# Patient Record
Sex: MALE | Race: WHITE | Employment: FULL TIME | URBAN - METROPOLITAN AREA
[De-identification: names, ages, dates, MRNs, and addresses within clinical notes are randomized per-mention and may not be internally consistent; named-entity substitution may affect disease eponyms.]

---

## 2017-01-23 ENCOUNTER — HOSPITAL ENCOUNTER (OUTPATIENT)
Dept: GENERAL RADIOLOGY | Age: 67
Discharge: HOME | End: 2017-01-23
Attending: ORTHOPAEDIC SURGERY
Payer: MEDICARE

## 2017-01-23 ENCOUNTER — HOSPITAL ENCOUNTER (OUTPATIENT)
Dept: CT IMAGING | Age: 67
Discharge: HOME | End: 2017-01-23
Attending: ORTHOPAEDIC SURGERY
Payer: MEDICARE

## 2017-01-23 VITALS
SYSTOLIC BLOOD PRESSURE: 122 MMHG | HEART RATE: 53 BPM | RESPIRATION RATE: 20 BRPM | BODY MASS INDEX: 35.55 KG/M2 | OXYGEN SATURATION: 98 % | HEIGHT: 69 IN | WEIGHT: 240 LBS | DIASTOLIC BLOOD PRESSURE: 65 MMHG

## 2017-01-23 DIAGNOSIS — M48.04 THORACIC SPINAL STENOSIS: ICD-10-CM

## 2017-01-23 DIAGNOSIS — M48.02 FORAMINAL STENOSIS OF CERVICAL REGION: ICD-10-CM

## 2017-01-23 PROCEDURE — 72126 CT NECK SPINE W/DYE: CPT

## 2017-01-23 PROCEDURE — 62305 MYELOGRAPHY LUMBAR INJECTION: CPT

## 2017-01-23 PROCEDURE — 74011636320 HC RX REV CODE- 636/320: Performed by: RADIOLOGY

## 2017-01-23 PROCEDURE — 72129 CT CHEST SPINE W/DYE: CPT

## 2017-01-23 RX ADMIN — IOHEXOL 20 ML: 240 INJECTION, SOLUTION INTRATHECAL; INTRAVASCULAR; INTRAVENOUS; ORAL at 08:50

## 2017-01-23 NOTE — IP AVS SNAPSHOT
[address] [facility] 
[phone] Patient: Ethel Sales. MRN: [id number] KGJ:[birth year] You are allergic to the following No active allergies Recent Documentation Height Weight BMI Smoking Status 1.753 m 108.9 kg 35.44 kg/m2 Never Smoker Emergency Contacts Name Discharge Info Relation Home Work Mobile Ashlie Godinez DISCHARGE CAREGIVER [3] Spouse [3] [phone] About your hospitalization You were admitted on:  January 23, 2017 You last received care in the:  [facility] RADIOLOGY You were discharged on:  January 23, 2017 Unit phone number:  [phone] Why you were hospitalized Your primary diagnosis was:  Not on File Providers Seen During Your Hospitalizations Provider Role Specialty Primary office phone Luan Dos Santos MD Attending Provider Orthopedic Surgery [phone] Your Primary Care Physician (PCP) Primary Care Physician Office Phone Office Fax Dillon Shanks [phone] Follow-up Information None Your Appointments Monday January 23, 2017  1:00 PM EST  
CT SPINE [facility] W CONT with ED [facility] CT [address] [facility] RAD CT ([address]) [address] [facility]  
[phone] CONTRAST STUDY: 1. The patient should not eat solid food four hours before the appointment but should be encouraged to drink clear liquids. 2. The patient will require IV access for contrast administration. 3. The patient should not take  Ibuprofen (Advil, Motrin, etc.) and Naproxen Sodium (Aleve, etc.)  on the day of the exam. Stopping non-steroidal anti-inflammatory agents (NSAIDs) like Ibuprofen decreases the risk of kidney damage from the x-ray contrast (dye).  4. Bring any non [facility] facility films/images pertaining to the area of interest with you on the day of appointment. 5. Bring current lab work if available(within last 90 days [facility]) ***If scheduled at [facility], iSTAT is not available, labs will need to be done before appointment*** 6. Check in at registration at least 30 minutes before appt time unless you were instructed to do otherwise. 7. If you have to drink oral contrast please pick it up any weekday prior to your appointment, if you cannot please check in 2 hrs  before appt time. Patient should report to outpatient registration ([facility]) 30 minutes prior to the appointment time unless instructed otherwise. Current Discharge Medication List  
  
ASK your doctor about these medications Dose & Instructions Dispensing Information Comments Morning Noon Evening Bedtime  
 aspirin 325 mg tablet Commonly known as:  ASPIRIN Your next dose is: Today, Tomorrow Other:  _________ Dose:  325 mg Take 325 mg by mouth daily. Refills:  0  
     
   
   
   
  
 * captopril 12.5 mg tablet Commonly known as:  CAPOTEN Your next dose is: Today, Tomorrow Other:  _________ Dose:  12.5 mg Take 1 Tab by mouth two (2) times a day. Quantity:  180 Tab Refills:  3  
     
   
   
   
  
 * captopril 12.5 mg tablet Commonly known as:  CAPOTEN Your next dose is: Today, Tomorrow Other:  _________ TAKE 1 TABLET BY MOUTH EVERY DAY BEFORE BREAKFAST, LUNCH AND DINNER Quantity:  90 Tab Refills:  3  
     
   
   
   
  
 carvedilol 25 mg tablet Commonly known as:  Haskel Scarce Your next dose is: Today, Tomorrow Other:  _________ Dose:  12.5 mg Take 0.5 Tabs by mouth two (2) times a day. Needs an appointment Quantity:  30 Tab Refills:  4 CENTRUM SILVER Tab tablet Generic drug:  multivitamins-minerals-lutein Your next dose is: Today, Tomorrow Other:  _________ Take  by mouth. Refills:  0  
     
   
   
   
  
 * CRESTOR 20 mg tablet Generic drug:  rosuvastatin Your next dose is: Today, Tomorrow Other:  _________ TAKE 1 TABLET BY MOUTH EVERY DAY Quantity:  90 Tab Refills:  2  
     
   
   
   
  
 * CRESTOR 20 mg tablet Generic drug:  rosuvastatin Your next dose is: Today, Tomorrow Other:  _________ TAKE 1 TABLET BY MOUTH EVERY DAY Quantity:  90 Tab Refills:  2  
     
   
   
   
  
 cyanocobalamin 1,000 mcg tablet Your next dose is: Today, Tomorrow Other:  _________ Dose:  1000 mcg Take 1,000 mcg by mouth daily. Refills:  0  
     
   
   
   
  
 furosemide 20 mg tablet Commonly known as:  LASIX Your next dose is: Today, Tomorrow Other:  _________ TAKE 1 TABLET BY MOUTH EVERY DAY***NEED OFFICE VISIT BEFORE FURTHER FILLS Quantity:  30 Tab Refills:  5 LEVITRA 20 mg tablet Generic drug:  vardenafil Your next dose is: Today, Tomorrow Other:  _________ Dose:  20 mg Take 20 mg by mouth as needed. Refills:  99  
     
   
   
   
  
 nitroglycerin 400 mcg/spray spray Commonly known as:  Matthieu Wei Your next dose is: Today, Tomorrow Other:  _________ Dose:  1 Spray 1 Spray by SubLINGual route every five (5) minutes as needed. Quantity:  1 Bottle Refills:  1  
     
   
   
   
  
 omega-3-dha-epa-dpa-fish oil 1,050-1,200 mg Cap Your next dose is: Today, Tomorrow Other:  _________ Dose:  1 Cap Take 1 Cap by mouth two (2) times a day. Refills:  0  
     
   
   
   
  
 promethazine-dm Syrp Your next dose is: Today, Tomorrow Other:  _________ Dose:  5 mL Take 5 mL by mouth four (4) times daily as needed. Quantity:  180 mL Refills:  2  
     
   
   
   
  
 tamsulosin 0.4 mg capsule Commonly known as:  FLOMAX Your next dose is: Today, Tomorrow Other:  _________ TAKE ONE CAPSULE BY MOUTH EVERY DAY Quantity:  30 Cap Refills:  10  
     
   
   
   
  
 VITAMIN D3 1,000 unit tablet Generic drug:  cholecalciferol Your next dose is: Today, Tomorrow Other:  _________ Take  by mouth daily. Refills:  0 ZETIA 10 mg tablet Generic drug:  ezetimibe Your next dose is: Today, Tomorrow Other:  _________ TAKE 1 TABLET BY MOUTH EVERY DAY Quantity:  90 Tab Refills:  2  
     
   
   
   
  
 * Notice: This list has 4 medication(s) that are the same as other medications prescribed for you. Read the directions carefully, and ask your doctor or other care provider to review them with you. Discharge Instructions Kristin Oconnell [facility] Special Procedures/Radiology Department Radiologist:  Dr. Marily Jon Date: January 23, 2016 Myelogram Discharge Instructions Restrict your activity for the next 48 hours. You may get up and sit in the chair or get up and go to the bathroom with slow movements. You must keep your head above your chest until 8 a.m. tomorrow. Rest as much as possible tonight and tomorrow. Resume your previous diet and follow the medication reconciliation form. Drink plenty of water. Avoid products with caffeine. You may take Tylenol, as directed on the label, for pain or discomfort. Avoid ibuprofen (Advil, Motrin) and aspirin as they may cause bleeding. Avoid lifting anything heavier than 5 pounds. Avoid excessive bending and lifting as this may increase the chance of having a headache. Be sure to follow up with your physician. Take your CD disk with you. If you have severe pain, or if you have any questions or concerns, please call [phone] and ask to speak to the nurse on-call. Patient Signature:  ______________________________________________ Discharge Orders None Introducing [facility] HE[facility]! [employer] introduces [employer] patient portal. Now you can access parts of your medical record, email your doctor's office, and request medication refills online. 1. In your internet browser, go to [email or web address] 2. Click on the First Time User? Click Here link in the Sign In box. You will see the New Member Sign Up page. 3. Enter your [employer] Access Code exactly as it appears below. You will not need to use this code after youve completed the sign-up process. If you do not sign up before the expiration date, you must request a new code. · [employer] Access Code: [email or web address] Expires: 3/22/2017  8:25 AM 
 
4. Enter the last four digits of your Social Security Number (xxxx) and Date of Birth (mm/dd/yyyy) as indicated and click Submit. You will be taken to the next sign-up page. 5. Create a [employer] ID. This will be your [employer] login ID and cannot be changed, so think of one that is secure and easy to remember. 6. Create a [employer] password. You can change your password at any time. 7. Enter your Password Reset Question and Answer. This can be used at a later time if you forget your password. 8. Enter your e-mail address. You will receive e-mail notification when new information is available in [address]. 9. Click Sign Up. You can now view and download portions of your medical record. 10. Click the Download Summary menu link to download a portable copy of your medical information. If you have questions, please visit the Frequently Asked Questions section of the [employer] website. Remember, [employer] is NOT to be used for urgent needs. For medical emergencies, dial 911. Now available from your iPhone and Android! General Information Please provide this summary of care documentation to your next provider. Patient Signature:  ____________________________________________________________ Date:  ____________________________________________________________  
  
Janyth Mins Provider Signature:  ____________________________________________________________ Date:  ____________________________________________________________

## 2017-01-23 NOTE — DISCHARGE INSTRUCTIONS
[address]  Special Procedures/Radiology Department    Radiologist:  Dr. Martinez Certain     Date: January 23, 2016       Myelogram Discharge Instructions    Restrict your activity for the next 48 hours. You may get up and sit in the chair or get up and go to the bathroom with slow movements. You must keep your head above your chest until 8 a.m. tomorrow. Rest as much as possible tonight and tomorrow. Resume your previous diet and follow the medication reconciliation form. Drink plenty of water. Avoid products with caffeine. You may take Tylenol, as directed on the label, for pain or discomfort. Avoid ibuprofen (Advil, Motrin) and aspirin as they may cause bleeding. Avoid lifting anything heavier than 5 pounds. Avoid excessive bending and lifting as this may increase the chance of having a headache. Be sure to follow up with your physician. Take your CD disk with you. If you have severe pain, or if you have any questions or concerns, please call [phone] and ask to speak to the nurse on-call.         Patient Signature:  ______________________________________________

## 2017-03-13 RX ORDER — TAMSULOSIN HYDROCHLORIDE 0.4 MG/1
CAPSULE ORAL
Qty: 30 CAP | Refills: 4 | Status: SHIPPED | OUTPATIENT
Start: 2017-03-13 | End: 2017-08-11 | Stop reason: SDUPTHER

## 2017-03-14 DIAGNOSIS — I42.9 CARDIOMYOPATHY (HCC): ICD-10-CM

## 2017-03-14 RX ORDER — CARVEDILOL 25 MG/1
12.5 TABLET ORAL 2 TIMES DAILY
Qty: 30 TAB | Refills: 0 | Status: SHIPPED | OUTPATIENT
Start: 2017-03-14 | End: 2017-04-18 | Stop reason: SDUPTHER

## 2017-04-03 RX ORDER — FUROSEMIDE 20 MG/1
TABLET ORAL
Qty: 30 TAB | Refills: 4 | Status: SHIPPED | OUTPATIENT
Start: 2017-04-03 | End: 2017-09-07 | Stop reason: SDUPTHER

## 2017-04-18 DIAGNOSIS — I42.9 CARDIOMYOPATHY (HCC): ICD-10-CM

## 2017-04-19 RX ORDER — CARVEDILOL 25 MG/1
TABLET ORAL
Qty: 30 TAB | Refills: 0 | Status: SHIPPED | OUTPATIENT
Start: 2017-04-19 | End: 2017-05-23 | Stop reason: SDUPTHER

## 2017-05-04 ENCOUNTER — CLINICAL SUPPORT (OUTPATIENT)
Dept: CARDIOLOGY CLINIC | Age: 67
End: 2017-05-04

## 2017-05-04 ENCOUNTER — OFFICE VISIT (OUTPATIENT)
Dept: CARDIOLOGY CLINIC | Age: 67
End: 2017-05-04

## 2017-05-04 VITALS
HEART RATE: 52 BPM | BODY MASS INDEX: 34.8 KG/M2 | SYSTOLIC BLOOD PRESSURE: 124 MMHG | RESPIRATION RATE: 18 BRPM | DIASTOLIC BLOOD PRESSURE: 68 MMHG | WEIGHT: 235 LBS | OXYGEN SATURATION: 95 % | HEIGHT: 69 IN

## 2017-05-04 DIAGNOSIS — I10 ESSENTIAL HYPERTENSION, BENIGN: ICD-10-CM

## 2017-05-04 DIAGNOSIS — I42.9 CARDIOMYOPATHY, UNSPECIFIED TYPE (HCC): ICD-10-CM

## 2017-05-04 DIAGNOSIS — I42.0 DILATED CARDIOMYOPATHY (HCC): ICD-10-CM

## 2017-05-04 DIAGNOSIS — I49.5 SICK SINUS SYNDROME (HCC): Primary | ICD-10-CM

## 2017-05-04 DIAGNOSIS — Z95.810 AUTOMATIC IMPLANTABLE CARDIOVERTER-DEFIBRILLATOR IN SITU: Primary | ICD-10-CM

## 2017-05-04 NOTE — PROGRESS NOTES
Subjective:      Mica Kapadia is a 75 y.o. male is here for annual device follow up. He has been feeling well and notes more energy after decrease in Coreg dosing at last visit. The patient denies chest pain/ shortness of breath, orthopnea, PND, LE edema, palpitations, syncope, presyncope or fatigue.        Patient Active Problem List    Diagnosis Date Noted    Low back pain 07/02/2013    Automatic implantable cardioverter-defibrillator in situ 06/11/2012    GERD (gastroesophageal reflux disease) 05/14/2012    Hematochezia 05/14/2012    Screen for colon cancer 05/14/2012    CAD (coronary artery disease) 02/16/2011    Mixed hyperlipidemia 02/16/2011    Cardiomyopathy ([facility] [address] 02/16/2011    Esophageal reflux 02/16/2011    Encounter for long-term (current) use of other medications 02/16/2011    Nocturia 02/16/2011    Abnormal PSA 02/16/2011    Essential hypertension, benign 02/16/2011      Connie Steele MD  Past Medical History:   Diagnosis Date    Abnormal PSA 2/16/2011    CAD (coronary artery disease) 2/16/2011    Cardiomyopathy ([facility] [address] 2/16/2011    Encounter for long-term (current) use of other medications 2/16/2011    Esophageal reflux 2/16/2011    Essential hypertension, benign 2/16/2011    GERD (gastroesophageal reflux disease) 5/14/2012    Heart attack ([facility] [address]     Low back pain 7/2/2013    Mixed hyperlipidemia 2/16/2011    Nocturia 2/16/2011    Pacemaker       Past Surgical History:   Procedure Laterality Date    HX CERVICAL FUSION  1997    c4,5,6    KS COLONOSCOPY FLX DX W/COLLJ SPEC WHEN PFRMD  5/14/2012         KS EGD TRANSORAL BIOPSY SINGLE/MULTIPLE  5/14/2012         REMOVAL GALLBLADDER       No Known Allergies   Family History   Problem Relation Age of Onset    Lung Disease Mother     Alcohol abuse Father     negative for cardiac disease  Social History     Social History    Marital status:      Spouse name: N/A    Number of children: N/A    Years of education: N/A     Social History Main Topics    Smoking status: Never Smoker    Smokeless tobacco: Never Used    Alcohol use No    Drug use: None    Sexual activity: Not Asked     Other Topics Concern    None     Social History Narrative     Current Outpatient Prescriptions   Medication Sig    carvedilol (COREG) 25 mg tablet TAKE ONE HALF TABLET BY MOUTH TWO (2) TIMES A DAY. NEEDS AN APPOINTMENT    furosemide (LASIX) 20 mg tablet TAKE 1 TABLET BY MOUTH EVERY DAY    tamsulosin (FLOMAX) 0.4 mg capsule TAKE 1 CAPSULE BY MOUTH EVERY DAY    ZETIA 10 mg tablet TAKE 1 TABLET BY MOUTH EVERY DAY    LEVITRA 20 mg tablet Take 20 mg by mouth as needed.  captopril (CAPOTEN) 12.5 mg tablet Take 1 Tab by mouth two (2) times a day.  CRESTOR 20 mg tablet TAKE 1 TABLET BY MOUTH EVERY DAY    nitroglycerin (NITROLINGUAL) 400 mcg/spray spray 1 Spray by SubLINGual route every five (5) minutes as needed.  cyanocobalamin 1,000 mcg tablet Take 1,000 mcg by mouth daily.  omega-3-dha-epa-dpa-fish oil 1,050-1,200 mg cap Take 1 Cap by mouth two (2) times a day.  D-METHORPHAN HB/PROMETH HCL (PROMETHAZINE-DM) Syrp Take 5 mL by mouth four (4) times daily as needed.  cholecalciferol, vitamin d3, (VITAMIN D) 1,000 unit tablet Take  by mouth daily.  aspirin (ASPIRIN) 325 mg tablet Take 325 mg by mouth daily.  multivitamins-minerals-lutein (CENTRUM SILVER) Tab Take  by mouth. No current facility-administered medications for this visit. Vitals:    05/04/17 1124   BP: 124/68   Pulse: (!) 52   Resp: 18   SpO2: 95%   Weight: 235 lb (106.6 kg)   Height: 5' 9\" (1.753 m)       I have reviewed the nurses notes, vitals, problem list, allergy list, medical history, family, social history and medications. Review of Symptoms:    General: Pt denies excessive weight gain or loss. Pt is able to conduct ADL's  HEENT: Denies blurred vision, headaches, epistaxis and difficulty swallowing.   Respiratory: Denies shortness of breath, WING, wheezing or stridor. Cardiovascular: Denies precordial pain, palpitations, edema or PND  Gastrointestinal: Denies poor appetite, indigestion, abdominal pain or blood in stool  Urinary: Denies dysuria, pyuria  Musculoskeletal: Denies pain or swelling from muscles or joints  Neurologic: Denies tremor, paresthesias, or sensory motor disturbance  Skin: Denies rash, itching or texture change. Psych: Denies depression      Physical Exam:      General: Well developed, in no acute distress. HEENT: Eyes - PERRL, no jvd  Heart:  Normal S1/S2 negative S3 or S4. Regular, no murmur, gallop or rub.   Respiratory: Clear bilaterally x 4, no wheezing or rales  Abdomen:   Soft, non-tender, bowel sounds are active.   Extremities:  No edema, normal cap refill, no cyanosis. Musculoskeletal: No clubbing  Neuro: A&Ox3, speech clear, gait stable. Skin: Skin color is normal. No rashes or lesions. Non diaphoretic  Vascular: 2+ pulses symmetric in all extremities    Cardiographics    Ekg: sick sinus syndrome w ventricular junctional escape beats    No results found for this or any previous visit. Lab Results   Component Value Date/Time    WBC 7.1 07/29/2015 08:04 AM    HGB 14.8 07/29/2015 08:04 AM    HCT 42.8 07/29/2015 08:04 AM    PLATELET 749 09/37/5285 08:04 AM    MCV 93 07/29/2015 08:04 AM      Lab Results   Component Value Date/Time    Sodium 141 07/16/2015 11:18 AM    Potassium 4.2 07/16/2015 11:18 AM    Chloride 106 07/16/2015 11:18 AM    CO2 20 07/16/2015 11:18 AM    Anion gap 8 07/29/2010 02:23 PM    Glucose 93 07/16/2015 11:18 AM    BUN 14 07/16/2015 11:18 AM    Creatinine 1.30 07/16/2015 11:18 AM    BUN/Creatinine ratio 11 07/16/2015 11:18 AM    GFR est AA 66 07/16/2015 11:18 AM    GFR est non-AA 57 07/16/2015 11:18 AM    Calcium 9.6 07/16/2015 11:18 AM    Bilirubin, total 1.3 07/16/2015 11:18 AM    AST (SGOT) 24 07/16/2015 11:18 AM    Alk.  phosphatase 36 07/16/2015 11:18 AM    Protein, total 6.2 07/16/2015 11:18 AM    Albumin 4.7 07/16/2015 11:18 AM    Globulin 2.3 07/29/2010 02:23 PM    A-G Ratio 3.1 07/16/2015 11:18 AM    ALT (SGPT) 30 07/16/2015 11:18 AM         Assessment:     Assessment:        ICD-10-CM ICD-9-CM    1. Essential hypertension, benign I10 401.1 AMB POC EKG ROUTINE W/ 12 LEADS, INTER & REP     Orders Placed This Encounter    AMB POC EKG ROUTINE W/ 12 LEADS, INTER & REP     Order Specific Question:   Reason for Exam:     Answer:   routine        Plan:   Mr. Austin Rice is here for annual device follow up. He denies cardiac complaints. EKG today demonstrates sick sinus. His blood pressure is normotensive. He is a candidate to for upgrade to dual chamber ICD. I discussed the risks/benefits/alternatives of the procedure with the patient. Risks include (but are not limited to) bleeding, infection, cva/mi/tamponade/death. The patient understands and agrees to proceed. Thank you for this interesting consultation. We will schedule. Continue medical management for HTN. Edna Gamez NP    Thank you for allowing me to participate in Shea Dodson 's care.     Sharron Marks MD, Sven Benjamin

## 2017-05-08 ENCOUNTER — HOSPITAL ENCOUNTER (OUTPATIENT)
Dept: LAB | Age: 67
Discharge: HOME | End: 2017-05-08
Payer: MEDICARE

## 2017-05-08 ENCOUNTER — HOSPITAL ENCOUNTER (OUTPATIENT)
Dept: GENERAL RADIOLOGY | Age: 67
Discharge: HOME | End: 2017-05-08
Payer: MEDICARE

## 2017-05-08 DIAGNOSIS — I49.5 SICK SINUS SYNDROME (HCC): ICD-10-CM

## 2017-05-08 PROCEDURE — 85610 PROTHROMBIN TIME: CPT

## 2017-05-08 PROCEDURE — 80053 COMPREHEN METABOLIC PANEL: CPT

## 2017-05-08 PROCEDURE — 71020 XR CHEST PA LAT: CPT

## 2017-05-08 PROCEDURE — 36415 COLL VENOUS BLD VENIPUNCTURE: CPT

## 2017-05-08 PROCEDURE — 85025 COMPLETE CBC W/AUTO DIFF WBC: CPT

## 2017-05-09 LAB
ALBUMIN SERPL-MCNC: 4.3 G/DL (ref 3.6–4.8)
ALBUMIN/GLOB SERPL: 2.5 {RATIO} (ref 1.2–2.2)
ALP SERPL-CCNC: 35 IU/L (ref 39–117)
ALT SERPL-CCNC: 32 IU/L (ref 0–44)
AST SERPL-CCNC: 25 IU/L (ref 0–40)
BASOPHILS # BLD AUTO: 0 X10E3/UL (ref 0–0.2)
BASOPHILS NFR BLD AUTO: 0 %
BILIRUB SERPL-MCNC: 1.3 MG/DL (ref 0–1.2)
BUN SERPL-MCNC: 13 MG/DL (ref 8–27)
BUN/CREAT SERPL: 10 (ref 10–24)
CALCIUM SERPL-MCNC: 9.2 MG/DL (ref 8.6–10.2)
CHLORIDE SERPL-SCNC: 104 MMOL/L (ref 96–106)
CO2 SERPL-SCNC: 20 MMOL/L (ref 18–29)
CREAT SERPL-MCNC: 1.31 MG/DL (ref 0.76–1.27)
EOSINOPHIL # BLD AUTO: 0.2 X10E3/UL (ref 0–0.4)
EOSINOPHIL NFR BLD AUTO: 3 %
ERYTHROCYTE [DISTWIDTH] IN BLOOD BY AUTOMATED COUNT: 13.4 % (ref 12.3–15.4)
GLOBULIN SER CALC-MCNC: 1.7 G/DL (ref 1.5–4.5)
GLUCOSE SERPL-MCNC: 136 MG/DL (ref 65–99)
HCT VFR BLD AUTO: 42 % (ref 37.5–51)
HGB BLD-MCNC: 14.9 G/DL (ref 12.6–17.7)
IMM GRANULOCYTES # BLD: 0 X10E3/UL (ref 0–0.1)
IMM GRANULOCYTES NFR BLD: 0 %
INR PPP: 1.1 (ref 0.8–1.2)
INTERPRETATION: NORMAL
LYMPHOCYTES # BLD AUTO: 1.8 X10E3/UL (ref 0.7–3.1)
LYMPHOCYTES NFR BLD AUTO: 37 %
MCH RBC QN AUTO: 32 PG (ref 26.6–33)
MCHC RBC AUTO-ENTMCNC: 35.5 G/DL (ref 31.5–35.7)
MCV RBC AUTO: 90 FL (ref 79–97)
MONOCYTES # BLD AUTO: 0.3 X10E3/UL (ref 0.1–0.9)
MONOCYTES NFR BLD AUTO: 6 %
NEUTROPHILS # BLD AUTO: 2.6 X10E3/UL (ref 1.4–7)
NEUTROPHILS NFR BLD AUTO: 54 %
PLATELET # BLD AUTO: 142 X10E3/UL (ref 150–379)
POTASSIUM SERPL-SCNC: 3.8 MMOL/L (ref 3.5–5.2)
PROT SERPL-MCNC: 6 G/DL (ref 6–8.5)
PROTHROMBIN TIME: 10.9 SEC (ref 9.1–12)
RBC # BLD AUTO: 4.66 X10E6/UL (ref 4.14–5.8)
SODIUM SERPL-SCNC: 143 MMOL/L (ref 134–144)
WBC # BLD AUTO: 5 X10E3/UL (ref 3.4–10.8)

## 2017-05-10 ENCOUNTER — CLINICAL SUPPORT (OUTPATIENT)
Dept: CARDIOLOGY CLINIC | Age: 67
End: 2017-05-10

## 2017-05-10 DIAGNOSIS — I49.5 SICK SINUS SYNDROME (HCC): ICD-10-CM

## 2017-05-10 DIAGNOSIS — Z95.810 AUTOMATIC IMPLANTABLE CARDIOVERTER-DEFIBRILLATOR IN SITU: ICD-10-CM

## 2017-05-10 DIAGNOSIS — I10 ESSENTIAL HYPERTENSION, BENIGN: ICD-10-CM

## 2017-05-10 DIAGNOSIS — I42.2 HYPERTROPHIC NONOBSTRUCTIVE CARDIOMYOPATHY (HCC): ICD-10-CM

## 2017-05-15 ENCOUNTER — ANESTHESIA (OUTPATIENT)
Dept: NON INVASIVE DIAGNOSTICS | Age: 67
End: 2017-05-15
Payer: MEDICARE

## 2017-05-15 ENCOUNTER — APPOINTMENT (OUTPATIENT)
Dept: GENERAL RADIOLOGY | Age: 67
End: 2017-05-15
Attending: INTERNAL MEDICINE
Payer: MEDICARE

## 2017-05-15 ENCOUNTER — ANESTHESIA EVENT (OUTPATIENT)
Dept: NON INVASIVE DIAGNOSTICS | Age: 67
End: 2017-05-15
Payer: MEDICARE

## 2017-05-15 ENCOUNTER — HOSPITAL ENCOUNTER (OUTPATIENT)
Dept: NON INVASIVE DIAGNOSTICS | Age: 67
Discharge: HOME | End: 2017-05-15
Attending: INTERNAL MEDICINE | Admitting: INTERNAL MEDICINE
Payer: MEDICARE

## 2017-05-15 VITALS
HEART RATE: 60 BPM | BODY MASS INDEX: 35.92 KG/M2 | OXYGEN SATURATION: 97 % | DIASTOLIC BLOOD PRESSURE: 83 MMHG | RESPIRATION RATE: 23 BRPM | HEIGHT: 68 IN | WEIGHT: 237 LBS | SYSTOLIC BLOOD PRESSURE: 136 MMHG

## 2017-05-15 PROCEDURE — C1894 INTRO/SHEATH, NON-LASER: HCPCS

## 2017-05-15 PROCEDURE — 74011250636 HC RX REV CODE- 250/636: Performed by: INTERNAL MEDICINE

## 2017-05-15 PROCEDURE — 77030018836 HC SOL IRR NACL ICUM -A

## 2017-05-15 PROCEDURE — 77030011992 HC AIRWY NASOPHGL TELE -A: Performed by: ANESTHESIOLOGY

## 2017-05-15 PROCEDURE — 77030012468 HC VLV BLEEDBK CNTRL ABBT -B

## 2017-05-15 PROCEDURE — 77030018729 HC ELECTRD DEFIB PAD CARD -B

## 2017-05-15 PROCEDURE — 74011250636 HC RX REV CODE- 250/636

## 2017-05-15 PROCEDURE — C1898 LEAD, PMKR, OTHER THAN TRANS: HCPCS

## 2017-05-15 PROCEDURE — 77030028698 HC BLD TISS PLSM MEDT -D

## 2017-05-15 PROCEDURE — C1892 INTRO/SHEATH,FIXED,PEEL-AWAY: HCPCS

## 2017-05-15 PROCEDURE — 74011250637 HC RX REV CODE- 250/637: Performed by: INTERNAL MEDICINE

## 2017-05-15 PROCEDURE — L3670 SO ACRO/CLAV CAN WEB PRE OTS: HCPCS

## 2017-05-15 PROCEDURE — C1769 GUIDE WIRE: HCPCS

## 2017-05-15 PROCEDURE — C1887 CATHETER, GUIDING: HCPCS

## 2017-05-15 PROCEDURE — 77030011640 HC PAD GRND REM COVD -A

## 2017-05-15 PROCEDURE — 93641 EP EVL 1/2CHMB PAC CVDFB TST: CPT

## 2017-05-15 PROCEDURE — 77030002996 HC SUT SLK J&J -A

## 2017-05-15 PROCEDURE — C1751 CATH, INF, PER/CENT/MIDLINE: HCPCS

## 2017-05-15 PROCEDURE — 77030031139 HC SUT VCRL2 J&J -A

## 2017-05-15 PROCEDURE — 71010 XR CHEST PORT: CPT

## 2017-05-15 PROCEDURE — 74011250637 HC RX REV CODE- 250/637

## 2017-05-15 PROCEDURE — C1900 LEAD, CORONARY VENOUS: HCPCS

## 2017-05-15 PROCEDURE — 74011636320 HC RX REV CODE- 636/320

## 2017-05-15 PROCEDURE — C1882 AICD, OTHER THAN SING/DUAL: HCPCS

## 2017-05-15 PROCEDURE — 74011636320 HC RX REV CODE- 636/320: Performed by: INTERNAL MEDICINE

## 2017-05-15 PROCEDURE — 74011000250 HC RX REV CODE- 250

## 2017-05-15 RX ORDER — SODIUM CHLORIDE 9 MG/ML
25 INJECTION, SOLUTION INTRAVENOUS CONTINUOUS
Status: DISCONTINUED | OUTPATIENT
Start: 2017-05-15 | End: 2017-05-15 | Stop reason: HOSPADM

## 2017-05-15 RX ORDER — NALOXONE HYDROCHLORIDE 0.4 MG/ML
0.4 INJECTION, SOLUTION INTRAMUSCULAR; INTRAVENOUS; SUBCUTANEOUS AS NEEDED
Status: DISCONTINUED | OUTPATIENT
Start: 2017-05-15 | End: 2017-05-15 | Stop reason: HOSPADM

## 2017-05-15 RX ORDER — MIDAZOLAM HYDROCHLORIDE 1 MG/ML
1-5 INJECTION, SOLUTION INTRAMUSCULAR; INTRAVENOUS
Status: DISCONTINUED | OUTPATIENT
Start: 2017-05-15 | End: 2017-05-15 | Stop reason: HOSPADM

## 2017-05-15 RX ORDER — MIDAZOLAM HYDROCHLORIDE 1 MG/ML
INJECTION, SOLUTION INTRAMUSCULAR; INTRAVENOUS
Status: DISCONTINUED
Start: 2017-05-15 | End: 2017-05-15 | Stop reason: HOSPADM

## 2017-05-15 RX ORDER — BACITRACIN 50000 [IU]/1
INJECTION, POWDER, FOR SOLUTION INTRAMUSCULAR
Status: COMPLETED
Start: 2017-05-15 | End: 2017-05-15

## 2017-05-15 RX ORDER — PROPOFOL 10 MG/ML
INJECTION, EMULSION INTRAVENOUS AS NEEDED
Status: DISCONTINUED | OUTPATIENT
Start: 2017-05-15 | End: 2017-05-15 | Stop reason: HOSPADM

## 2017-05-15 RX ORDER — FENTANYL CITRATE 50 UG/ML
12.5-5 INJECTION, SOLUTION INTRAMUSCULAR; INTRAVENOUS
Status: DISCONTINUED | OUTPATIENT
Start: 2017-05-15 | End: 2017-05-15 | Stop reason: HOSPADM

## 2017-05-15 RX ORDER — HEPARIN SODIUM 200 [USP'U]/100ML
2000 INJECTION, SOLUTION INTRAVENOUS ONCE
Status: COMPLETED | OUTPATIENT
Start: 2017-05-15 | End: 2017-05-15

## 2017-05-15 RX ORDER — FENTANYL CITRATE 50 UG/ML
INJECTION, SOLUTION INTRAMUSCULAR; INTRAVENOUS
Status: DISCONTINUED
Start: 2017-05-15 | End: 2017-05-15 | Stop reason: HOSPADM

## 2017-05-15 RX ORDER — SODIUM CHLORIDE, SODIUM LACTATE, POTASSIUM CHLORIDE, CALCIUM CHLORIDE 600; 310; 30; 20 MG/100ML; MG/100ML; MG/100ML; MG/100ML
INJECTION, SOLUTION INTRAVENOUS
Status: DISCONTINUED | OUTPATIENT
Start: 2017-05-15 | End: 2017-05-15 | Stop reason: HOSPADM

## 2017-05-15 RX ORDER — SODIUM CHLORIDE 0.9 % (FLUSH) 0.9 %
5-10 SYRINGE (ML) INJECTION EVERY 8 HOURS
Status: DISCONTINUED | OUTPATIENT
Start: 2017-05-15 | End: 2017-05-15 | Stop reason: HOSPADM

## 2017-05-15 RX ORDER — LIDOCAINE HYDROCHLORIDE 10 MG/ML
1-40 INJECTION INFILTRATION; PERINEURAL
Status: DISCONTINUED | OUTPATIENT
Start: 2017-05-15 | End: 2017-05-15 | Stop reason: HOSPADM

## 2017-05-15 RX ORDER — HEPARIN SODIUM 200 [USP'U]/100ML
INJECTION, SOLUTION INTRAVENOUS
Status: COMPLETED
Start: 2017-05-15 | End: 2017-05-15

## 2017-05-15 RX ORDER — ACETAMINOPHEN 325 MG/1
650 TABLET ORAL
Status: DISCONTINUED | OUTPATIENT
Start: 2017-05-15 | End: 2017-05-15 | Stop reason: HOSPADM

## 2017-05-15 RX ORDER — SODIUM CHLORIDE 0.9 % (FLUSH) 0.9 %
5-10 SYRINGE (ML) INJECTION AS NEEDED
Status: DISCONTINUED | OUTPATIENT
Start: 2017-05-15 | End: 2017-05-15 | Stop reason: HOSPADM

## 2017-05-15 RX ORDER — ACETAMINOPHEN 325 MG/1
TABLET ORAL
Status: COMPLETED
Start: 2017-05-15 | End: 2017-05-15

## 2017-05-15 RX ORDER — CEFAZOLIN SODIUM IN 0.9 % NACL 2 G/100 ML
2 PLASTIC BAG, INJECTION (ML) INTRAVENOUS ONCE
Status: COMPLETED | OUTPATIENT
Start: 2017-05-15 | End: 2017-05-15

## 2017-05-15 RX ORDER — BACITRACIN 50000 [IU]/1
50000 INJECTION, POWDER, FOR SOLUTION INTRAMUSCULAR ONCE
Status: COMPLETED | OUTPATIENT
Start: 2017-05-15 | End: 2017-05-15

## 2017-05-15 RX ORDER — MIDAZOLAM HYDROCHLORIDE 1 MG/ML
INJECTION, SOLUTION INTRAMUSCULAR; INTRAVENOUS AS NEEDED
Status: DISCONTINUED | OUTPATIENT
Start: 2017-05-15 | End: 2017-05-15 | Stop reason: HOSPADM

## 2017-05-15 RX ORDER — CEFAZOLIN SODIUM IN 0.9 % NACL 2 G/100 ML
2 PLASTIC BAG, INJECTION (ML) INTRAVENOUS ONCE
Status: DISCONTINUED | OUTPATIENT
Start: 2017-05-15 | End: 2017-05-15 | Stop reason: HOSPADM

## 2017-05-15 RX ORDER — LIDOCAINE HYDROCHLORIDE 10 MG/ML
INJECTION INFILTRATION; PERINEURAL
Status: COMPLETED
Start: 2017-05-15 | End: 2017-05-15

## 2017-05-15 RX ORDER — PROPOFOL 10 MG/ML
INJECTION, EMULSION INTRAVENOUS
Status: DISCONTINUED | OUTPATIENT
Start: 2017-05-15 | End: 2017-05-15 | Stop reason: HOSPADM

## 2017-05-15 RX ORDER — CEFAZOLIN SODIUM 1 G/3ML
INJECTION, POWDER, FOR SOLUTION INTRAMUSCULAR; INTRAVENOUS
Status: DISCONTINUED
Start: 2017-05-15 | End: 2017-05-15 | Stop reason: HOSPADM

## 2017-05-15 RX ORDER — FENTANYL CITRATE 50 UG/ML
INJECTION, SOLUTION INTRAMUSCULAR; INTRAVENOUS AS NEEDED
Status: DISCONTINUED | OUTPATIENT
Start: 2017-05-15 | End: 2017-05-15 | Stop reason: HOSPADM

## 2017-05-15 RX ORDER — LISINOPRIL 20 MG/1
20 TABLET ORAL ONCE
Status: COMPLETED | OUTPATIENT
Start: 2017-05-15 | End: 2017-05-15

## 2017-05-15 RX ADMIN — IOPAMIDOL 100 ML: 755 INJECTION, SOLUTION INTRAVENOUS at 09:00

## 2017-05-15 RX ADMIN — PROPOFOL 50 MG: 10 INJECTION, EMULSION INTRAVENOUS at 08:19

## 2017-05-15 RX ADMIN — ACETAMINOPHEN 650 MG: 325 TABLET ORAL at 12:40

## 2017-05-15 RX ADMIN — MIDAZOLAM HYDROCHLORIDE 2 MG: 1 INJECTION, SOLUTION INTRAMUSCULAR; INTRAVENOUS at 08:05

## 2017-05-15 RX ADMIN — IOPAMIDOL 30 ML: 755 INJECTION, SOLUTION INTRAVENOUS at 08:00

## 2017-05-15 RX ADMIN — LIDOCAINE HYDROCHLORIDE 30 ML: 10 INJECTION INFILTRATION; PERINEURAL at 08:45

## 2017-05-15 RX ADMIN — HEPARIN SODIUM 2000 UNITS: 200 INJECTION, SOLUTION INTRAVENOUS at 08:57

## 2017-05-15 RX ADMIN — SODIUM CHLORIDE 25 ML/HR: 900 INJECTION, SOLUTION INTRAVENOUS at 07:49

## 2017-05-15 RX ADMIN — PROPOFOL 40 MG: 10 INJECTION, EMULSION INTRAVENOUS at 08:45

## 2017-05-15 RX ADMIN — LISINOPRIL 20 MG: 20 TABLET ORAL at 13:44

## 2017-05-15 RX ADMIN — SODIUM CHLORIDE, SODIUM LACTATE, POTASSIUM CHLORIDE, CALCIUM CHLORIDE: 600; 310; 30; 20 INJECTION, SOLUTION INTRAVENOUS at 07:55

## 2017-05-15 RX ADMIN — PROPOFOL 75 MCG/KG/MIN: 10 INJECTION, EMULSION INTRAVENOUS at 08:07

## 2017-05-15 RX ADMIN — BACITRACIN 50000 UNITS: 5000 INJECTION, POWDER, FOR SOLUTION INTRAMUSCULAR at 09:55

## 2017-05-15 RX ADMIN — CEFAZOLIN 2 G: 10 INJECTION, POWDER, FOR SOLUTION INTRAVENOUS; PARENTERAL at 08:07

## 2017-05-15 RX ADMIN — BACITRACIN 50000 UNITS: 50000 INJECTION, POWDER, FOR SOLUTION INTRAMUSCULAR at 09:55

## 2017-05-15 RX ADMIN — LIDOCAINE HYDROCHLORIDE 30 ML: 10 INJECTION, SOLUTION INFILTRATION; PERINEURAL at 08:45

## 2017-05-15 RX ADMIN — FENTANYL CITRATE 100 MCG: 50 INJECTION, SOLUTION INTRAMUSCULAR; INTRAVENOUS at 08:11

## 2017-05-15 RX ADMIN — PROPOFOL 40 MG: 10 INJECTION, EMULSION INTRAVENOUS at 09:18

## 2017-05-15 NOTE — PROGRESS NOTES
Pt awake, alert. Denies any complaints of pain or discomfort. Left chest incision site dry and intact. No bleeding or hematoma noted. CXR completed. VSS. Lunch tray ordered. Denies any needs at this time. Wife at bedside.

## 2017-05-15 NOTE — PROGRESS NOTES
Pt c/o mild discomfort to left shoulder. Rated 2/10. Tylenol given per MD orders. /109. MD notified and orders received. Will continue to monitor.

## 2017-05-15 NOTE — ANESTHESIA POSTPROCEDURE EVALUATION
Post-Anesthesia Evaluation and Assessment    Patient: Anil Contreras MRN: [id number]  SSN: [id number]    YOB: 1950  Age: 75 y.o. Sex: male       Cardiovascular Function/Vital Signs  Visit Vitals    /88 (BP 1 Location: Right arm, BP Patient Position: At rest)    Pulse 65    Resp 17    Ht 5' 8\" (1.727 m)    Wt 107.5 kg (237 lb)    SpO2 94%    BMI 36.04 kg/m2       Patient is status post general, total IV anesthesia anesthesia for * No procedures listed *. Nausea/Vomiting: None    Postoperative hydration reviewed and adequate. Pain:  Pain Scale 1: Numeric (0 - 10) (05/15/17 1015)  Pain Intensity 1: 0 (05/15/17 1015)   Managed    Neurological Status: At baseline    Mental Status and Level of Consciousness: Arousable    Pulmonary Status:   O2 Device: Nasal cannula (05/15/17 1015)   Adequate oxygenation and airway patent    Complications related to anesthesia: None    Post-anesthesia assessment completed.  No concerns    Signed By: Juan Villafana MD     May 15, 2017

## 2017-05-15 NOTE — PROGRESS NOTES
Cardiac Cath Lab Recovery Arrival Note:      Diallo Looney. arrived to Cardiac Cath Lab, Recovery Area. Staff introduced to patient. Patient identifiers verified with NAME and DATE OF BIRTH. Procedure verified with patient. Consent forms reviewed and signed by patient or authorized representative and verified. Allergies verified. Patient and family oriented to department. Patient and family informed of procedure and plan of care. Questions answered with review. Patient prepped for procedure, per orders from physician, prior to arrival.    Patient on cardiac monitor, non-invasive blood pressure, SPO2 monitor. On Room Air. Patient is A&Ox 3. Patient reports no complaints of pain or discomfort  . Patient in stretcher, in low position, with side rails up, call bell within reach, patient instructed to call if assistance as needed. Patient prep in: [address], [place] [address]. Family in: [de-identified]. Prep by: JASMIN Way

## 2017-05-15 NOTE — INTERVAL H&P NOTE
H&P Update:  Diallo Bui was seen and examined. History and physical has been reviewed. The patient has been examined.  There have been no significant clinical changes since the completion of the originally dated History and Physical.    Signed By: Pieter Sweeney MD     May 15, 2017 10:56 AM

## 2017-05-15 NOTE — ANESTHESIA PREPROCEDURE EVALUATION
Anesthetic History   No history of anesthetic complications            Review of Systems / Medical History  Patient summary reviewed, nursing notes reviewed and pertinent labs reviewed    Pulmonary  Within defined limits                 Neuro/Psych   Within defined limits           Cardiovascular    Hypertension          Pacemaker, past MI, CAD and hyperlipidemia    Exercise tolerance: <4 METS  Comments: Cardiomyopathy   EF 25-30%     GI/Hepatic/Renal     GERD          Comments: Hx Hematochezia Endo/Other  Within defined limits           Other Findings   Comments: Nocturia   Abnormal PSA   Low back pain   HX CERVICAL FUSION c4,5,6           Physical Exam    Airway  Mallampati: I    Neck ROM: normal range of motion   Mouth opening: Normal     Cardiovascular  Regular rate and rhythm,  S1 and S2 normal,  no murmur, click, rub, or gallop             Dental  No notable dental hx       Pulmonary  Breath sounds clear to auscultation               Abdominal  GI exam deferred       Other Findings            Anesthetic Plan    ASA: 3  Anesthesia type: general          Induction: Intravenous  Anesthetic plan and risks discussed with: Patient

## 2017-05-15 NOTE — IP AVS SNAPSHOT
Current Discharge Medication List  
  
CONTINUE these medications which have NOT CHANGED Dose & Instructions Dispensing Information Comments Morning Noon Evening Bedtime  
 aspirin 325 mg tablet Commonly known as:  ASPIRIN Your last dose was: Your next dose is:    
   
   
 Dose:  325 mg Take 325 mg by mouth daily. Refills:  0  
     
   
   
   
  
 captopril 12.5 mg tablet Commonly known as:  CAPOTEN Your last dose was: Your next dose is:    
   
   
 Dose:  12.5 mg Take 1 Tab by mouth two (2) times a day. Quantity:  180 Tab Refills:  3  
     
   
   
   
  
 carvedilol 25 mg tablet Commonly known as:  Armand Baird Your last dose was: Your next dose is: TAKE ONE HALF TABLET BY MOUTH TWO (2) TIMES A DAY. NEEDS AN APPOINTMENT Quantity:  30 Tab Refills:  0 Generic For:COREG 25MG Pt has an appt on 5/4/17 CENTRUM SILVER Tab tablet Generic drug:  multivitamins-minerals-lutein Your last dose was: Your next dose is: Take  by mouth. Refills:  0  
     
   
   
   
  
 CRESTOR 20 mg tablet Generic drug:  rosuvastatin Your last dose was: Your next dose is: TAKE 1 TABLET BY MOUTH EVERY DAY Quantity:  90 Tab Refills:  2  
     
   
   
   
  
 cyanocobalamin 1,000 mcg tablet Your last dose was: Your next dose is:    
   
   
 Dose:  1000 mcg Take 1,000 mcg by mouth daily. Refills:  0  
     
   
   
   
  
 furosemide 20 mg tablet Commonly known as:  LASIX Your last dose was: Your next dose is: TAKE 1 TABLET BY MOUTH EVERY DAY Quantity:  30 Tab Refills:  4 Generic For:LASIX 20MG LEVITRA 20 mg tablet Generic drug:  vardenafil Your last dose was: Your next dose is:    
   
   
 Dose:  20 mg Take 20 mg by mouth as needed.   
 Refills:  99  
     
   
   
 nitroglycerin 400 mcg/spray spray Commonly known as:  Kiel Shaw Your last dose was: Your next dose is:    
   
   
 Dose:  1 Spray 1 Spray by SubLINGual route every five (5) minutes as needed. Quantity:  1 Bottle Refills:  1  
     
   
   
   
  
 omega-3-dha-epa-dpa-fish oil 1,050-1,200 mg Cap Your last dose was: Your next dose is:    
   
   
 Dose:  1 Cap Take 1 Cap by mouth two (2) times a day. Refills:  0  
     
   
   
   
  
 promethazine-dm Syrp Your last dose was: Your next dose is:    
   
   
 Dose:  5 mL Take 5 mL by mouth four (4) times daily as needed. Quantity:  180 mL Refills:  2  
     
   
   
   
  
 tamsulosin 0.4 mg capsule Commonly known as:  FLOMAX Your last dose was: Your next dose is: TAKE 1 CAPSULE BY MOUTH EVERY DAY Quantity:  30 Cap Refills:  4 Generic For:FLOMAX 0.4MG  
    
   
   
   
  
 VITAMIN D3 1,000 unit tablet Generic drug:  cholecalciferol Your last dose was: Your next dose is: Take  by mouth daily. Refills:  0 ZETIA 10 mg tablet Generic drug:  ezetimibe Your last dose was: Your next dose is: TAKE 1 TABLET BY MOUTH EVERY DAY Quantity:  90 Tab Refills:  2

## 2017-05-15 NOTE — H&P (VIEW-ONLY)
Subjective:      David Nichols is a 75 y.o. male is here for annual device follow up. He has been feeling well and notes more energy after decrease in Coreg dosing at last visit. The patient denies chest pain/ shortness of breath, orthopnea, PND, LE edema, palpitations, syncope, presyncope or fatigue.        Patient Active Problem List    Diagnosis Date Noted    Low back pain 07/02/2013    Automatic implantable cardioverter-defibrillator in situ 06/11/2012    GERD (gastroesophageal reflux disease) 05/14/2012    Hematochezia 05/14/2012    Screen for colon cancer 05/14/2012    CAD (coronary artery disease) 02/16/2011    Mixed hyperlipidemia 02/16/2011    Cardiomyopathy ([facility] [address] 02/16/2011    Esophageal reflux 02/16/2011    Encounter for long-term (current) use of other medications 02/16/2011    Nocturia 02/16/2011    Abnormal PSA 02/16/2011    Essential hypertension, benign 02/16/2011      Alveta Lefort, MD  Past Medical History:   Diagnosis Date    Abnormal PSA 2/16/2011    CAD (coronary artery disease) 2/16/2011    Cardiomyopathy ([facility] [address] 2/16/2011    Encounter for long-term (current) use of other medications 2/16/2011    Esophageal reflux 2/16/2011    Essential hypertension, benign 2/16/2011    GERD (gastroesophageal reflux disease) 5/14/2012    Heart attack ([facility] [address]     Low back pain 7/2/2013    Mixed hyperlipidemia 2/16/2011    Nocturia 2/16/2011    Pacemaker       Past Surgical History:   Procedure Laterality Date    HX CERVICAL FUSION  1997    c4,5,6    [place] COLONOSCOPY FLX DX W/COLLJ SPEC WHEN PFRMD  5/14/2012         NH EGD TRANSORAL BIOPSY SINGLE/MULTIPLE  5/14/2012         REMOVAL GALLBLADDER       No Known Allergies   Family History   Problem Relation Age of Onset    Lung Disease Mother     Alcohol abuse Father     negative for cardiac disease  Social History     Social History    Marital status:      Spouse name: N/A    Number of children: N/A    Years of education: N/A     Social History Main Topics    Smoking status: Never Smoker    Smokeless tobacco: Never Used    Alcohol use No    Drug use: None    Sexual activity: Not Asked     Other Topics Concern    None     Social History Narrative     Current Outpatient Prescriptions   Medication Sig    carvedilol (COREG) 25 mg tablet TAKE ONE HALF TABLET BY MOUTH TWO (2) TIMES A DAY. NEEDS AN APPOINTMENT    furosemide (LASIX) 20 mg tablet TAKE 1 TABLET BY MOUTH EVERY DAY    tamsulosin (FLOMAX) 0.4 mg capsule TAKE 1 CAPSULE BY MOUTH EVERY DAY    ZETIA 10 mg tablet TAKE 1 TABLET BY MOUTH EVERY DAY    LEVITRA 20 mg tablet Take 20 mg by mouth as needed.  captopril (CAPOTEN) 12.5 mg tablet Take 1 Tab by mouth two (2) times a day.  CRESTOR 20 mg tablet TAKE 1 TABLET BY MOUTH EVERY DAY    nitroglycerin (NITROLINGUAL) 400 mcg/spray spray 1 Spray by SubLINGual route every five (5) minutes as needed.  cyanocobalamin 1,000 mcg tablet Take 1,000 mcg by mouth daily.  omega-3-dha-epa-dpa-fish oil 1,050-1,200 mg cap Take 1 Cap by mouth two (2) times a day.  D-METHORPHAN HB/PROMETH HCL (PROMETHAZINE-DM) Syrp Take 5 mL by mouth four (4) times daily as needed.  cholecalciferol, vitamin d3, (VITAMIN D) 1,000 unit tablet Take  by mouth daily.  aspirin (ASPIRIN) 325 mg tablet Take 325 mg by mouth daily.  multivitamins-minerals-lutein (CENTRUM SILVER) Tab Take  by mouth. No current facility-administered medications for this visit. Vitals:    05/04/17 1124   BP: 124/68   Pulse: (!) 52   Resp: 18   SpO2: 95%   Weight: 235 lb (106.6 kg)   Height: 5' 9\" (1.753 m)       I have reviewed the nurses notes, vitals, problem list, allergy list, medical history, family, social history and medications. Review of Symptoms:    General: Pt denies excessive weight gain or loss. Pt is able to conduct ADL's  HEENT: Denies blurred vision, headaches, epistaxis and difficulty swallowing.   Respiratory: Denies shortness of breath, WING, wheezing or stridor. Cardiovascular: Denies precordial pain, palpitations, edema or PND  Gastrointestinal: Denies poor appetite, indigestion, abdominal pain or blood in stool  Urinary: Denies dysuria, pyuria  Musculoskeletal: Denies pain or swelling from muscles or joints  Neurologic: Denies tremor, paresthesias, or sensory motor disturbance  Skin: Denies rash, itching or texture change. Psych: Denies depression      Physical Exam:      General: Well developed, in no acute distress. HEENT: Eyes - PERRL, no jvd  Heart:  Normal S1/S2 negative S3 or S4. Regular, no murmur, gallop or rub.   Respiratory: Clear bilaterally x 4, no wheezing or rales  Abdomen:   Soft, non-tender, bowel sounds are active.   Extremities:  No edema, normal cap refill, no cyanosis. Musculoskeletal: No clubbing  Neuro: A&Ox3, speech clear, gait stable. Skin: Skin color is normal. No rashes or lesions. Non diaphoretic  Vascular: 2+ pulses symmetric in all extremities    Cardiographics    Ekg: sick sinus syndrome w ventricular junctional escape beats    No results found for this or any previous visit. Lab Results   Component Value Date/Time    WBC 7.1 07/29/2015 08:04 AM    HGB 14.8 07/29/2015 08:04 AM    HCT 42.8 07/29/2015 08:04 AM    PLATELET 063 26/11/2603 08:04 AM    MCV 93 07/29/2015 08:04 AM      Lab Results   Component Value Date/Time    Sodium 141 07/16/2015 11:18 AM    Potassium 4.2 07/16/2015 11:18 AM    Chloride 106 07/16/2015 11:18 AM    CO2 20 07/16/2015 11:18 AM    Anion gap 8 07/29/2010 02:23 PM    Glucose 93 07/16/2015 11:18 AM    BUN 14 07/16/2015 11:18 AM    Creatinine 1.30 07/16/2015 11:18 AM    BUN/Creatinine ratio 11 07/16/2015 11:18 AM    GFR est AA 66 07/16/2015 11:18 AM    GFR est non-AA 57 07/16/2015 11:18 AM    Calcium 9.6 07/16/2015 11:18 AM    Bilirubin, total 1.3 07/16/2015 11:18 AM    AST (SGOT) 24 07/16/2015 11:18 AM    Alk.  phosphatase 36 07/16/2015 11:18 AM    Protein, total 6.2 07/16/2015 11:18 AM    Albumin 4.7 07/16/2015 11:18 AM    Globulin 2.3 07/29/2010 02:23 PM    A-G Ratio 3.1 07/16/2015 11:18 AM    ALT (SGPT) 30 07/16/2015 11:18 AM         Assessment:     Assessment:        ICD-10-CM ICD-9-CM    1. Essential hypertension, benign I10 401.1 AMB POC EKG ROUTINE W/ 12 LEADS, INTER & REP     Orders Placed This Encounter    AMB POC EKG ROUTINE W/ 12 LEADS, INTER & REP     Order Specific Question:   Reason for Exam:     Answer:   routine        Plan:   Mr. Leela Mann is here for annual device follow up. He denies cardiac complaints. EKG today demonstrates sick sinus. His blood pressure is normotensive. He is a candidate to for upgrade to dual chamber ICD. I discussed the risks/benefits/alternatives of the procedure with the patient. Risks include (but are not limited to) bleeding, infection, cva/mi/tamponade/death. The patient understands and agrees to proceed. Thank you for this interesting consultation. We will schedule. Continue medical management for HTN. Tali Leblanc NP    Thank you for allowing me to participate in Irlanda Warner 's care.     Vivian Mon MD, Ольга Harmon

## 2017-05-15 NOTE — PROCEDURES
[address]  [phone]    Indications and Pre-Procedure Diagnosis:  Mica Kapadia is a 75 y.o. male with sick sinus syndrome, cardiomyopathy, LBBB and CHF is referred for upgrade to BiV implantable cardiac defibrillator. The left ventricular ejection fraction is 25-30% and the patient is NYHA Class III. The QRS is 142 ms. The patient has been on ace inhibitor and beta blocker therapy for greater than 3 months. Post Procedure Diagnosis:    cardiomyopathy  Sick sinus syndrome  CHF  LBBB    Venogram and upgrade to BIV-ICD Implant Procedure and Findings:  Informed consent was obtained and the patient was premedicated with cefazolin. The procedure was performed under local anesthesia. Continuous pulse oximetry and cuff pressure were monitored. During the procedure, the patient received Versed, Fentanyl and Propofol for sedation per anesthesia personnel. The left deltopectoral area was prepped and draped in the usual sterile fashion and was liberally infiltrated with 1% lidocaine. A venogram was performed that demonstrated patency of the left venous system. An incision was made over the left subpectoral area and a generator pocket was manually dissected. Access was achieved in the left axillary vein under fluoroscopic guidance and using the seldinger technique. Through the left axillary vein, pacing/defibrillation leads were positioned in appropriate regions in the right heart chambers where satisfactory pacing and sensing parameters were measured. A venogram was performed to assess patency of the coronary sinus, and a pacing lead was positioned appropriately to stimulate the left ventricle. Stability of the leads was assessed with deep breathing and there was no diaphragmatic pacing at 10V output. The leads were anchored using the sleeves and a pulse generator pocket fashioned using blunt dissection. The leads were then connected to the new BiV ICD pulse generator.  The chronic RV lead was disconnected from the chronic ICD pulse generator and the pulse generator was removed from the body. The chronic RV lead was then connected to the new BiV-ICD. The pulse generator pocket was then liberally infiltrated with bacitracin solution, and the device implanted with a single silk fixation suture in the header to prevent migration. The wound was closed in layers using continuous 2-0 Vicryl and 4-0 Vicryl ending with a sub-cuticular closure. Fluoroscopy and total procedure times were 10 and 70 minutes respectively. Estimated blood loss <10 ml. Sharp count: correct. Specimen(s) collected: none. The following procedure related complication occurred: none. The following problems were encountered: none. Findings: successful upgrade to bi-ventricular ICD placement.     Device Data Measurements:  Lead Sensing (mV) Threshold (V)Pulse Width (ms) Impedance (Ohms)  RA 4.7  1.7  0.5   502  RV 12.4  1.2  0.5   540  CS 5.8  1.5  0.5   1910    Defibrillator Function Testing  Induction Rhythm Energy (J) Impedance Polarity Success        (Ohms)  On T  VF  21  41  RV-  Yes         Final Programmed Parameters  Bradycardia pacing rate  60 bpm  Pacing Mode    DDDR  Pacing Output    3.5 V@ 0.5 ms (RA) 2 V@ 0.5 ms (RV) 3.5 V@ 0.5 (LV)  Fibrillation Detect Interval  210 bpm  First Shock Energy   41 J  Electrode Configuration  RV -  ATP Status    On    Supplies Summary available in the chart    Dory Callahan MD, Laurie Kim

## 2017-05-15 NOTE — DISCHARGE INSTRUCTIONS
[address] [place] [address]  [phone]        ICD/PACEMAKER DISCHARGE INSTRUCTIONS    Patient ID:  Neymar Good  [id number]  34 y.o.  1950    Admit Date: 5/15/2017    Discharge Date: 5/15/2017     Admitting Physician: Rayna Norton MD     Discharge Physician: Rayna Norton MD    Admission Diagnoses:   SSS    Discharge Diagnoses: Active Problems:    * No active hospital problems. *  cardiomyopathy  chf  lbbb  sss    Discharge Condition: Good    Cardiology Procedures this Admission:  upgrade to BiV-ICD    Disposition: home    Reference discharge instructions provided by nursing for diet and activity. Follow-up with Dr. Svitlana Edmondson in 2 weeks. Please contact the office for an appointment at [phone]. Signed:  Rayna Norton MD  5/15/2017  10:45 AM    DISCHARGE INSTRUCTIONS FOR PATIENTS WITH ICD'S AND PACEMAKERS    1. Carry you ID card for your ICD/Pacemaker with you at all times. This card will be given to you in the hospital or mailed to you. 2. Medic Alert Bracelets are available from your pharmacist to wear at all times. 3. Call for an appointment in 2 weeks [phone]. 4. The pacemaker will bulge slightly under your skin. An ICD bulges a little more because it is larger. The bulge will decrease in size over the next few weeks. Please notify the doctor's office if you notice any of the following around your ICD site:  A.  A bruise that does not go away  B. Soreness or yellow, green, or brown drainage from the site. C. Any swelling from the site. D. If you have a fever of 100 degrees or higher that lasts for a few days    INCISION CARE       1.  Leave dressing over your site until you see the doctor. 2.  Leave steri-strips over your site until they start to fall off.   3.   You may shower after as long as your incision isnt submerged or directly sprayed upon until well healed. 4.  For comfort, wear loose fitting clothing.   5.  Ice pack to affected shoulder for first 24 hours, wear your sling for 2 days. 6.  Report any signs of infection, fever, pain, swelling, redness, oozing, or heat at site especially if these symptoms increase after the first 3 to 4 days. ACTIVITY PRECAUTIONS     1. Avoid rough contact with the implant site. 2. No driving for 14 days. 3. Avoid lifting your arm over your head, carrying anything on the affected side, or lifting over 10 pounds for 30 days. For the first 2 days only bend your arm at the elbow. 4. Any extreme activity such as golf, weight lifting or exercise biking should be restricted for 60 days. 5. Do not carry objects by holding them against your implant site. 6.  No shooting rifles or any type of gun with the affected shoulder permanently. 7.  If you have an ICD, welding and chainsaws are prohibited. SPECIAL PRECAUTIONS     1. You should avoid all strong magnetic fields, such as arc welding, large transformers, large motors. Some ICD devices will beep if it detects a strong magnet. If this occurs, move out of the area. 2.  You may not have an MRI. 3.  Treatments or surgery that requires diathermy or electrocautery should be discussed with your doctor before scheduled. 4. Avoid radio frequency transmitters, including radar. 5. Advise dentist or other medical personnel you see that you have a pacemaker or ICD. 6.  Cell phones and microwave oven use is okay. 7.  If you plan to move or take a trip to a new area, the doctor's office will give you a name of a doctor to contact for any problems. SPECIAL INSTRUCTIONS ON SHOCKS   1. Notify your doctor for any of the following:      A. Anytime a shock is received in a 24 hour period. An office visit is not usually required for a single shock. B.  Two or more shocks in a row. If you do not feel well, call the [facility], otherwise call your doctor. This may require an office visit.       C. Two or more shocks spaced apart by several hours.  This may require an office visit. 2.  Keep a record of events. Include date, time, symptoms and activity at that time. ANTIBIOTIC THERAPY    During the first 8 weeks after your pacemaker or ICD insertion, you may need antibiotics before any dental work or certain tests or operations. Let the dentist or doctor who is caring for you know that you have had an implanted device.

## 2017-05-15 NOTE — PROGRESS NOTES
Discharge instructions given and reviewed with patient and wife - both verbalized understanding of same. Pressure dressing applied to site. Pt d/c home with copy of instructions, ice and sling.

## 2017-05-15 NOTE — PROGRESS NOTES
TRANSFER - IN REPORT:    Verbal report received from Gerry Alvarez RN on Anil Johnson.  being received from EP lab for routine progression of care. Report consisted of patients Situation, Background, Assessment and Recommendations(SBAR). Information from the following report(s) SBAR, Procedure Summary and MAR was reviewed with the receiving clinician. Opportunity for questions and clarification was provided. Assessment completed upon patients arrival to [address] [place] [address] and care assumed. Cardiac Cath Lab Recovery Arrival Note:    Anil Johnson. arrived to [address] [facility]. Patient procedure= upgrade to BIVICD. Patient on cardiac monitor, non-invasive blood pressure, SPO2 monitor. On O2 @ 2 lpm via NC.  IV  of NS on pump at 25 ml/hr. Patient status doing well without problems. Patient is A&Ox 3. Patient reports no complaints of pain or discomfort. PROCEDURE SITE CHECK:    Procedure site:without any bleeding and no hematoma, no pain/discomfort reported at procedure site. No change in patient status. Continue to monitor patient and status.

## 2017-05-16 ENCOUNTER — PATIENT OUTREACH (OUTPATIENT)
Dept: CARDIOLOGY CLINIC | Age: 67
End: 2017-05-16

## 2017-05-16 NOTE — PROGRESS NOTES
Patient at [facility] on 5/15/17 for SSS and upgrade of dual chamber ICD. Attempted to reach patient to perform discharge assessment. Left 2 voicemail's requesting call back.

## 2017-05-22 RX ORDER — EZETIMIBE 10 MG/1
TABLET ORAL
Qty: 90 TAB | Refills: 1 | Status: SHIPPED | OUTPATIENT
Start: 2017-05-22 | End: 2017-11-09 | Stop reason: SDUPTHER

## 2017-05-23 ENCOUNTER — TELEPHONE (OUTPATIENT)
Dept: CARDIOLOGY CLINIC | Age: 67
End: 2017-05-23

## 2017-05-23 DIAGNOSIS — I42.9 CARDIOMYOPATHY, UNSPECIFIED TYPE (HCC): ICD-10-CM

## 2017-05-23 RX ORDER — CARVEDILOL 25 MG/1
25 TABLET ORAL 2 TIMES DAILY WITH MEALS
Qty: 60 TAB | Refills: 1 | Status: SHIPPED | OUTPATIENT
Start: 2017-05-23 | End: 2017-09-11 | Stop reason: SDUPTHER

## 2017-06-01 ENCOUNTER — CLINICAL SUPPORT (OUTPATIENT)
Dept: CARDIOLOGY CLINIC | Age: 67
End: 2017-06-01

## 2017-06-01 DIAGNOSIS — Z95.810 BIVENTRICULAR AUTOMATIC IMPLANTABLE CARDIOVERTER DEFIBRILLATOR IN SITU: ICD-10-CM

## 2017-06-01 DIAGNOSIS — I42.2 HYPERTROPHIC NONOBSTRUCTIVE CARDIOMYOPATHY (HCC): Primary | ICD-10-CM

## 2017-06-05 NOTE — PROGRESS NOTES
Braulio Cerda.  1950  Fadumo Malagon MD          Subjective:    Patient is here for 2 week site check and device interrogation after upgrade to BiV implantable cardiac defibrillator. The patient denies chest pain or shortness of breath. Feels well. Denies any fevers. Patient Active Problem List    Diagnosis Date Noted    Low back pain 07/02/2013    Automatic implantable cardioverter-defibrillator in situ 06/11/2012    GERD (gastroesophageal reflux disease) 05/14/2012    Hematochezia 05/14/2012    Screen for colon cancer 05/14/2012    CAD (coronary artery disease) 02/16/2011    Mixed hyperlipidemia 02/16/2011    Cardiomyopathy ([facility] [address] 02/16/2011    Esophageal reflux 02/16/2011    Encounter for long-term (current) use of other medications 02/16/2011    Nocturia 02/16/2011    Abnormal PSA 02/16/2011    Essential hypertension, benign 02/16/2011      Past Medical History:   Diagnosis Date    Abnormal PSA 2/16/2011    CAD (coronary artery disease) 2/16/2011    Cardiomyopathy ([facility] [address] 2/16/2011    Encounter for long-term (current) use of other medications 2/16/2011    Esophageal reflux 2/16/2011    Essential hypertension, benign 2/16/2011    GERD (gastroesophageal reflux disease) 5/14/2012    Heart attack ([address]     Low back pain 7/2/2013    Mixed hyperlipidemia 2/16/2011    Nocturia 2/16/2011    Pacemaker       Past Surgical History:   Procedure Laterality Date    HX CERVICAL FUSION  1997    c4,5,6    OR COLONOSCOPY FLX DX W/COLLJ SPEC WHEN PFRMD  5/14/2012         OR EGD TRANSORAL BIOPSY SINGLE/MULTIPLE  5/14/2012         REMOVAL GALLBLADDER       No Known Allergies   Family History   Problem Relation Age of Onset    Lung Disease Mother     Alcohol abuse Father       Current Outpatient Prescriptions   Medication Sig    carvedilol (COREG) 25 mg tablet Take 1 Tab by mouth two (2) times daily (with meals).     ezetimibe (ZETIA) 10 mg tablet TAKE 1 TABLET BY MOUTH EVERY DAY  furosemide (LASIX) 20 mg tablet TAKE 1 TABLET BY MOUTH EVERY DAY    tamsulosin (FLOMAX) 0.4 mg capsule TAKE 1 CAPSULE BY MOUTH EVERY DAY    LEVITRA 20 mg tablet Take 20 mg by mouth as needed.  captopril (CAPOTEN) 12.5 mg tablet Take 1 Tab by mouth two (2) times a day.  CRESTOR 20 mg tablet TAKE 1 TABLET BY MOUTH EVERY DAY    nitroglycerin (NITROLINGUAL) 400 mcg/spray spray 1 Spray by SubLINGual route every five (5) minutes as needed.  cyanocobalamin 1,000 mcg tablet Take 1,000 mcg by mouth daily.  omega-3-dha-epa-dpa-fish oil 1,050-1,200 mg cap Take 1 Cap by mouth two (2) times a day.  D-METHORPHAN HB/PROMETH HCL (PROMETHAZINE-DM) Syrp Take 5 mL by mouth four (4) times daily as needed.  cholecalciferol, vitamin d3, (VITAMIN D) 1,000 unit tablet Take  by mouth daily.  aspirin (ASPIRIN) 325 mg tablet Take 325 mg by mouth daily.  multivitamins-minerals-lutein (CENTRUM SILVER) Tab Take  by mouth. No current facility-administered medications for this visit. Review of Systems:    General: Pt denies excessive weight gain or loss. Pt is able to conduct ADL's  Respiratory: Denies shortness of breath, WING, wheezing or stridor. Cardiovascular: Denies precordial pain, palpitations, edema or PND        Physical ExamPhysical Exam:      General: Well developed, in no acute distress. .  Chest: left subclavian pacer site approximated well, steri-strips intact  Neuro: A&Ox3, speech clear, gait stable. Assessment:   Assessment:     ICD-10-CM ICD-9-CM    1. Hypertrophic nonobstructive cardiomyopathy (HCC) I42.2 425.18    2. Biventricular automatic implantable cardioverter defibrillator in situ Z95.810 V45.02         Plan:     Patient feels well following generator change; upgrade to BiV implantable cardiac defibrillator. Rate response turned on due to flat histograms. Left subclavian pacemaker site approximated well, no discharge. Steri-strips are intact. No erythema or heat. Follow up as planned, in 3 mo.      Babita Sumner, ANP

## 2017-08-12 RX ORDER — TAMSULOSIN HYDROCHLORIDE 0.4 MG/1
CAPSULE ORAL
Qty: 30 CAP | Refills: 4 | Status: SHIPPED | OUTPATIENT
Start: 2017-08-12 | End: 2018-01-08 | Stop reason: SDUPTHER

## 2017-09-07 ENCOUNTER — CLINICAL SUPPORT (OUTPATIENT)
Dept: CARDIOLOGY CLINIC | Age: 67
End: 2017-09-07

## 2017-09-07 ENCOUNTER — OFFICE VISIT (OUTPATIENT)
Dept: CARDIOLOGY CLINIC | Age: 67
End: 2017-09-07

## 2017-09-07 VITALS
SYSTOLIC BLOOD PRESSURE: 116 MMHG | RESPIRATION RATE: 18 BRPM | OXYGEN SATURATION: 94 % | HEART RATE: 60 BPM | DIASTOLIC BLOOD PRESSURE: 80 MMHG | HEIGHT: 68 IN | BODY MASS INDEX: 36.28 KG/M2 | WEIGHT: 239.4 LBS

## 2017-09-07 DIAGNOSIS — Z95.810 AUTOMATIC IMPLANTABLE CARDIOVERTER-DEFIBRILLATOR IN SITU: ICD-10-CM

## 2017-09-07 DIAGNOSIS — I25.10 CORONARY ARTERY DISEASE INVOLVING NATIVE CORONARY ARTERY OF NATIVE HEART WITHOUT ANGINA PECTORIS: ICD-10-CM

## 2017-09-07 DIAGNOSIS — I42.9 CARDIOMYOPATHY, UNSPECIFIED TYPE (HCC): ICD-10-CM

## 2017-09-07 DIAGNOSIS — I10 ESSENTIAL HYPERTENSION, BENIGN: Primary | ICD-10-CM

## 2017-09-07 DIAGNOSIS — Z95.810 AUTOMATIC IMPLANTABLE CARDIOVERTER-DEFIBRILLATOR IN SITU: Primary | ICD-10-CM

## 2017-09-07 RX ORDER — FUROSEMIDE 20 MG/1
TABLET ORAL
Qty: 30 TAB | Refills: 4 | Status: SHIPPED | OUTPATIENT
Start: 2017-09-07 | End: 2018-02-09 | Stop reason: SDUPTHER

## 2017-09-07 NOTE — PROGRESS NOTES
Subjective:      John Dang is a 75 y.o. male is here for follow up s/p BIV ICD upgrade. He is doing well. The patient denies chest pain/ shortness of breath, orthopnea, PND, LE edema, palpitations, syncope, presyncope or fatigue.        Patient Active Problem List    Diagnosis Date Noted    Low back pain 07/02/2013    Automatic implantable cardioverter-defibrillator in situ 06/11/2012    GERD (gastroesophageal reflux disease) 05/14/2012    Hematochezia 05/14/2012    Screen for colon cancer 05/14/2012    CAD (coronary artery disease) 02/16/2011    Mixed hyperlipidemia 02/16/2011    Cardiomyopathy ([facility] 02/16/2011    Esophageal reflux 02/16/2011    Encounter for long-term (current) use of other medications 02/16/2011    Nocturia 02/16/2011    Abnormal PSA 02/16/2011    Essential hypertension, benign 02/16/2011      Tiny Mcgrath MD  Past Medical History:   Diagnosis Date    Abnormal PSA 2/16/2011    CAD (coronary artery disease) 2/16/2011    Cardiomyopathy ([facility] 2/16/2011    Encounter for long-term (current) use of other medications 2/16/2011    Esophageal reflux 2/16/2011    Essential hypertension, benign 2/16/2011    GERD (gastroesophageal reflux disease) 5/14/2012    Heart attack ([facility]     Low back pain 7/2/2013    Mixed hyperlipidemia 2/16/2011    Nocturia 2/16/2011    Pacemaker       Past Surgical History:   Procedure Laterality Date    HX CERVICAL FUSION  1997    c4,5,6    [place] COLONOSCOPY FLX DX W/COLLJ SPEC WHEN PFRMD  5/14/2012         [place] EGD TRANSORAL BIOPSY SINGLE/MULTIPLE  5/14/2012         REMOVAL GALLBLADDER       No Known Allergies   Family History   Problem Relation Age of Onset    Lung Disease Mother     Alcohol abuse Father     negative for cardiac disease  Social History     Social History    Marital status:      Spouse name: N/A    Number of children: N/A    Years of education: N/A     Social History Main Topics    Smoking status: Never Smoker    Smokeless tobacco: Never Used    Alcohol use No    Drug use: None    Sexual activity: Not Asked     Other Topics Concern    None     Social History Narrative     Current Outpatient Prescriptions   Medication Sig    tamsulosin (FLOMAX) 0.4 mg capsule TAKE 1 CAPSULE BY MOUTH EVERY DAY    carvedilol (COREG) 25 mg tablet Take 1 Tab by mouth two (2) times daily (with meals).  ezetimibe (ZETIA) 10 mg tablet TAKE 1 TABLET BY MOUTH EVERY DAY    furosemide (LASIX) 20 mg tablet TAKE 1 TABLET BY MOUTH EVERY DAY    LEVITRA 20 mg tablet Take 20 mg by mouth as needed.  captopril (CAPOTEN) 12.5 mg tablet Take 1 Tab by mouth two (2) times a day.  CRESTOR 20 mg tablet TAKE 1 TABLET BY MOUTH EVERY DAY    nitroglycerin (NITROLINGUAL) 400 mcg/spray spray 1 Spray by SubLINGual route every five (5) minutes as needed.  cyanocobalamin 1,000 mcg tablet Take 1,000 mcg by mouth daily.  omega-3-dha-epa-dpa-fish oil 1,050-1,200 mg cap Take 1 Cap by mouth two (2) times a day.  D-METHORPHAN HB/PROMETH HCL (PROMETHAZINE-DM) Syrp Take 5 mL by mouth four (4) times daily as needed.  cholecalciferol, vitamin d3, (VITAMIN D) 1,000 unit tablet Take  by mouth daily.  aspirin (ASPIRIN) 325 mg tablet Take 325 mg by mouth daily.  multivitamins-minerals-lutein (CENTRUM SILVER) Tab Take  by mouth. No current facility-administered medications for this visit. Vitals:    09/07/17 0845   BP: 116/80   Pulse: 60   Resp: 18   SpO2: 94%   Weight: 239 lb 6.4 oz (108.6 kg)   Height: 5' 8\" (1.727 m)       I have reviewed the nurses notes, vitals, problem list, allergy list, medical history, family, social history and medications. Review of Symptoms:    General: Pt denies excessive weight gain or loss. Pt is able to conduct ADL's  HEENT: Denies blurred vision, headaches, epistaxis and difficulty swallowing. Respiratory: Denies shortness of breath, WING, wheezing or stridor.   Cardiovascular: Denies precordial pain, palpitations, edema or PND  Gastrointestinal: Denies poor appetite, indigestion, abdominal pain or blood in stool  Urinary: Denies dysuria, pyuria  Musculoskeletal: Denies pain or swelling from muscles or joints  Neurologic: Denies tremor, paresthesias, or sensory motor disturbance  Skin: Denies rash, itching or texture change. Psych: Denies depression      Physical Exam:      General: Well developed, in no acute distress. HEENT: Eyes - PERRL, no jvd  Heart:  Normal S1/S2 negative S3 or S4. Regular, no murmur, gallop or rub.   Respiratory: Clear bilaterally x 4, no wheezing or rales  Abdomen:   Soft, non-tender, bowel sounds are active.   Extremities:  No edema, normal cap refill, no cyanosis. Musculoskeletal: No clubbing  Neuro: A&Ox3, speech clear, gait stable. Skin: Skin color is normal. No rashes or lesions. Non diaphoretic  Vascular: 2+ pulses symmetric in all extremities    Cardiographics    Ekg: AV pacing  BSC Bi-V ICD: 86% AP, 93% RVP, 97% LVP  No episodes    No results found for this or any previous visit. Lab Results   Component Value Date/Time    WBC 5.0 05/08/2017 01:54 PM    HGB 14.9 05/08/2017 01:54 PM    HCT 42.0 05/08/2017 01:54 PM    PLATELET 597 27/69/6103 01:54 PM    MCV 90 05/08/2017 01:54 PM      Lab Results   Component Value Date/Time    Sodium 143 05/08/2017 01:54 PM    Potassium 3.8 05/08/2017 01:54 PM    Chloride 104 05/08/2017 01:54 PM    CO2 20 05/08/2017 01:54 PM    Anion gap 8 07/29/2010 02:23 PM    Glucose 136 05/08/2017 01:54 PM    BUN 13 05/08/2017 01:54 PM    Creatinine 1.31 05/08/2017 01:54 PM    BUN/Creatinine ratio 10 05/08/2017 01:54 PM    GFR est AA 64 05/08/2017 01:54 PM    GFR est non-AA 56 05/08/2017 01:54 PM    Calcium 9.2 05/08/2017 01:54 PM    Bilirubin, total 1.3 05/08/2017 01:54 PM    AST (SGOT) 25 05/08/2017 01:54 PM    Alk.  phosphatase 35 05/08/2017 01:54 PM    Protein, total 6.0 05/08/2017 01:54 PM    Albumin 4.3 05/08/2017 01:54 PM Globulin 2.3 07/29/2010 02:23 PM    A-G Ratio 2.5 05/08/2017 01:54 PM    ALT (SGPT) 32 05/08/2017 01:54 PM         Assessment:     Assessment:        ICD-10-CM ICD-9-CM    1. Essential hypertension, benign I10 401.1 AMB POC EKG ROUTINE W/ 12 LEADS, INTER & REP   2. Coronary artery disease involving native coronary artery of native heart without angina pectoris I25.10 414.01    3. Cardiomyopathy, unspecified type ([facility] I42.9 425.4    4. Automatic implantable cardioverter-defibrillator in situ Z95.810 V45.02      Orders Placed This Encounter    AMB POC EKG ROUTINE W/ 12 LEADS, INTER & REP     Order Specific Question:   Reason for Exam:     Answer:   routine        Plan:   Mr. Tri Pereira is here for follow up s/p upgrade to BIV ICD. He is doing well and denies cardiac complaints. EKG demonstrates AV pacing and device interrogation reveals normal functioning. Will repeat echocardiogram and follow up with Dr. Adele Hewitt in one year. Continue medical management for HTN, CAD. Thank you for allowing me to participate in Darryle Pillion. 's care.     TRINA Shaw MD, Perfecto Deems

## 2017-09-07 NOTE — MR AVS SNAPSHOT
Visit Information Date & Time Provider Department Dept. Phone Encounter #  
 9/7/2017  9:00 AM Zhen Peters, [address] [facility] [phone] [id number] Your Appointments 12/19/2017  8:30 AM  
PACEMAKER with PACEMAKER, [facility] [address]) Appt Note: BSC BiV ICD 3mo check, declines remote [id number] [facility]  
[phone] [id number] [facility] Upcoming Health Maintenance Date Due Hepatitis C Screening 1950 Pneumococcal 65+ Low/Medium Risk (1 of 2 - PCV13) 2/9/2014 MEDICARE YEARLY EXAM 7/16/2016 GLAUCOMA SCREENING Q2Y 1/15/2017 COLONOSCOPY 5/14/2017 INFLUENZA AGE 9 TO ADULT 8/1/2017 DTaP/Tdap/Td series (2 - Td) 7/16/2025 Allergies as of 9/7/2017  Review Complete On: 9/7/2017 By: Veena Soliz NP No Known Allergies Current Immunizations  Reviewed on 7/16/2015 Name Date Tdap 7/16/2015 Not reviewed this visit You Were Diagnosed With   
  
 Codes Comments Essential hypertension, benign    -  Primary ICD-10-CM: I10 
ICD-9-CM: 401.1 Coronary artery disease involving native coronary artery of native heart without angina pectoris     ICD-10-CM: I25.10 ICD-9-CM: 414.01 [facility]     ICD-10-CM: I42.9 ICD-9-CM: 425.4 Automatic implantable cardioverter-defibrillator in situ     ICD-10-CM: Z95.810 ICD-9-CM: V45.02 Vitals BP Pulse Resp Height(growth percentile) Weight(growth percentile) SpO2  
 116/80 (BP 1 Location: Left arm, BP Patient Position: Sitting) 60 18 5' 8\" (1.727 m) 239 lb 6.4 oz (108.6 kg) 94% BMI Smoking Status 36.4 kg/m2 Never Smoker Vitals History BMI and BSA Data Body Mass Index Body Surface Area  
 36.4 kg/m 2 2.28 m 2 Preferred Pharmacy Pharmacy Name Phone [address], [address] [phone] Your Updated Medication List  
  
   
This list is accurate as of: 9/7/17  9:46 AM.  Always use your most recent med list.  
  
  
  
  
 aspirin 325 mg tablet Commonly known as:  ASPIRIN Take 325 mg by mouth daily. captopril 12.5 mg tablet Commonly known as:  CAPOTEN Take 1 Tab by mouth two (2) times a day. carvedilol 25 mg tablet Commonly known as:  Raynette Hy Take 1 Tab by mouth two (2) times daily (with meals). CENTRUM SILVER Tab tablet Generic drug:  multivitamins-minerals-lutein Take  by mouth. CRESTOR 20 mg tablet Generic drug:  rosuvastatin TAKE 1 TABLET BY MOUTH EVERY DAY  
  
 cyanocobalamin 1,000 mcg tablet Take 1,000 mcg by mouth daily. ezetimibe 10 mg tablet Commonly known as:  Nonnie Rang TAKE 1 TABLET BY MOUTH EVERY DAY  
  
 furosemide 20 mg tablet Commonly known as:  LASIX TAKE 1 TABLET BY MOUTH EVERY DAY  
  
 LEVITRA 20 mg tablet Generic drug:  vardenafil Take 20 mg by mouth as needed. nitroglycerin 400 mcg/spray spray Commonly known as:  NITROLINGUAL  
1 [place] by SubLINGual route every five (5) minutes as needed. omega-3-dha-epa-dpa-fish oil 1,050-1,200 mg Cap Take 1 Cap by mouth two (2) times a day. promethazine-dm Syrp Take 5 mL by mouth four (4) times daily as needed. tamsulosin 0.4 mg capsule Commonly known as:  FLOMAX TAKE 1 CAPSULE BY MOUTH EVERY DAY  
  
 VITAMIN D3 1,000 unit tablet Generic drug:  cholecalciferol Take  by mouth daily. We Performed the Following AMB POC EKG ROUTINE W/ 12 LEADS, INTER & REP [78338 CPT(R)] To-Do List   
 Around 09/07/2017 ECHO:  2D ECHO COMPLETE ADULT (TTE) W OR WO CONTR Introducing [facility]!    
 [facility] introduces [employer] patient portal. Now you can access parts of your medical record, email your doctor's office, and request medication refills online. 1. In your internet browser, go to [email or web address] 2. Click on the First Time User? Click Here link in the Sign In box. You will see the New Member Sign Up page. 3. Enter your Wave Systems Access Code exactly as it appears below. You will not need to use this code after youve completed the sign-up process. If you do not sign up before the expiration date, you must request a new code. · Wave Systems Access Code: [email or web address] Expires: 12/6/2017  8:54 AM 
 
4. Enter the last four digits of your Social Security Number (xxxx) and Date of Birth (mm/dd/yyyy) as indicated and click Submit. You will be taken to the next sign-up page. 5. Create a Wave Systems ID. This will be your Wave Systems login ID and cannot be changed, so think of one that is secure and easy to remember. 6. Create a Wave Systems password. You can change your password at any time. 7. Enter your Password Reset Question and Answer. This can be used at a later time if you forget your password. 8. Enter your e-mail address. You will receive e-mail notification when new information is available in [address] 9. Click Sign Up. You can now view and download portions of your medical record. 10. Click the Download Summary menu link to download a portable copy of your medical information. If you have questions, please visit the Frequently Asked Questions section of the Wave Systems website. Remember, Wave Systems is NOT to be used for urgent needs. For medical emergencies, dial 911. Now available from your iPhone and Android! Please provide this summary of care documentation to your next provider. Your primary care clinician is listed as Lana Perea. If you have any questions after today's visit, please call [phone].

## 2017-09-11 DIAGNOSIS — I42.9 CARDIOMYOPATHY, UNSPECIFIED TYPE (HCC): ICD-10-CM

## 2017-09-11 RX ORDER — CARVEDILOL 25 MG/1
TABLET ORAL
Qty: 60 TAB | Refills: 1 | Status: SHIPPED | OUTPATIENT
Start: 2017-09-11 | End: 2018-01-08 | Stop reason: SDUPTHER

## 2017-09-28 ENCOUNTER — CLINICAL SUPPORT (OUTPATIENT)
Dept: CARDIOLOGY CLINIC | Age: 67
End: 2017-09-28

## 2017-09-28 DIAGNOSIS — I42.2 HYPERTROPHIC NON-OBSTRUCTIVE CARDIOMYOPATHY (HCC): Primary | ICD-10-CM

## 2017-09-28 DIAGNOSIS — I42.9 CARDIOMYOPATHY, UNSPECIFIED TYPE (HCC): ICD-10-CM

## 2017-10-04 ENCOUNTER — TELEPHONE (OUTPATIENT)
Dept: CARDIOLOGY CLINIC | Age: 67
End: 2017-10-04

## 2017-11-09 RX ORDER — EZETIMIBE 10 MG/1
TABLET ORAL
Qty: 90 TAB | Refills: 1 | Status: SHIPPED | OUTPATIENT
Start: 2017-11-09 | End: 2018-05-19 | Stop reason: SDUPTHER

## 2017-12-19 ENCOUNTER — CLINICAL SUPPORT (OUTPATIENT)
Dept: CARDIOLOGY CLINIC | Age: 67
End: 2017-12-19

## 2017-12-19 DIAGNOSIS — Z95.810 AUTOMATIC IMPLANTABLE CARDIOVERTER-DEFIBRILLATOR IN SITU: Primary | ICD-10-CM

## 2017-12-19 DIAGNOSIS — I42.9 CARDIOMYOPATHY, UNSPECIFIED TYPE (HCC): ICD-10-CM

## 2018-01-08 DIAGNOSIS — I42.9 CARDIOMYOPATHY, UNSPECIFIED TYPE (HCC): ICD-10-CM

## 2018-01-08 RX ORDER — TAMSULOSIN HYDROCHLORIDE 0.4 MG/1
CAPSULE ORAL
Qty: 30 CAP | Refills: 4 | Status: SHIPPED | OUTPATIENT
Start: 2018-01-08 | End: 2018-06-07 | Stop reason: SDUPTHER

## 2018-01-08 RX ORDER — CARVEDILOL 25 MG/1
TABLET ORAL
Qty: 60 TAB | Refills: 1 | Status: SHIPPED | OUTPATIENT
Start: 2018-01-08 | End: 2018-05-11 | Stop reason: SDUPTHER

## 2018-02-09 RX ORDER — FUROSEMIDE 20 MG/1
TABLET ORAL
Qty: 30 TAB | Refills: 4 | Status: SHIPPED | OUTPATIENT
Start: 2018-02-09 | End: 2018-07-09 | Stop reason: SDUPTHER

## 2018-03-20 ENCOUNTER — CLINICAL SUPPORT (OUTPATIENT)
Dept: CARDIOLOGY CLINIC | Age: 68
End: 2018-03-20

## 2018-03-20 ENCOUNTER — TELEPHONE (OUTPATIENT)
Dept: CARDIOLOGY CLINIC | Age: 68
End: 2018-03-20

## 2018-03-20 DIAGNOSIS — I48.0 PAROXYSMAL ATRIAL FIBRILLATION (HCC): Primary | ICD-10-CM

## 2018-03-20 DIAGNOSIS — Z95.810 AUTOMATIC IMPLANTABLE CARDIOVERTER-DEFIBRILLATOR IN SITU: Primary | ICD-10-CM

## 2018-03-20 DIAGNOSIS — I42.9 CARDIOMYOPATHY, UNSPECIFIED TYPE (HCC): ICD-10-CM

## 2018-03-20 NOTE — PROGRESS NOTES
I spoke with patient during his device interrogation today. Device check indicated an episode of AF lasting 10 hours in February 2017. He is BIV paced and otherwise asymptomatic. He is also a CHADS VASC 3. Discussed indication for anticoagulation for cva risk reduction. Will start Eliquis 5mg BID and have him follow up in 4-6 weeks with Dr. Meredith Vigil regarding AF.     Jayesh Kline NP

## 2018-04-19 ENCOUNTER — OFFICE VISIT (OUTPATIENT)
Dept: CARDIOLOGY CLINIC | Age: 68
End: 2018-04-19

## 2018-04-19 ENCOUNTER — CLINICAL SUPPORT (OUTPATIENT)
Dept: CARDIOLOGY CLINIC | Age: 68
End: 2018-04-19

## 2018-04-19 VITALS
SYSTOLIC BLOOD PRESSURE: 132 MMHG | RESPIRATION RATE: 16 BRPM | HEIGHT: 68 IN | OXYGEN SATURATION: 96 % | WEIGHT: 242.2 LBS | HEART RATE: 74 BPM | DIASTOLIC BLOOD PRESSURE: 78 MMHG | BODY MASS INDEX: 36.71 KG/M2

## 2018-04-19 DIAGNOSIS — I25.10 CORONARY ARTERY DISEASE INVOLVING NATIVE CORONARY ARTERY OF NATIVE HEART WITHOUT ANGINA PECTORIS: ICD-10-CM

## 2018-04-19 DIAGNOSIS — I42.9 CARDIOMYOPATHY, UNSPECIFIED TYPE (HCC): ICD-10-CM

## 2018-04-19 DIAGNOSIS — I48.0 PAROXYSMAL ATRIAL FIBRILLATION (HCC): ICD-10-CM

## 2018-04-19 DIAGNOSIS — I49.9 IRREGULAR HEARTBEAT: Primary | ICD-10-CM

## 2018-04-19 DIAGNOSIS — I10 ESSENTIAL HYPERTENSION, BENIGN: ICD-10-CM

## 2018-04-19 DIAGNOSIS — Z95.810 AUTOMATIC IMPLANTABLE CARDIOVERTER-DEFIBRILLATOR IN SITU: ICD-10-CM

## 2018-04-19 DIAGNOSIS — Z95.810 AUTOMATIC IMPLANTABLE CARDIOVERTER-DEFIBRILLATOR IN SITU: Primary | ICD-10-CM

## 2018-04-19 PROBLEM — E66.01 SEVERE OBESITY (BMI 35.0-39.9) WITH COMORBIDITY (HCC): Status: ACTIVE | Noted: 2018-04-19

## 2018-04-19 NOTE — PROGRESS NOTES
1. Have you been to the ER, urgent care clinic since your last visit? Hospitalized since your last visit? No    2. Have you seen or consulted any other health care providers outside of the [address] [place] [address] since your last visit? Include any pap smears or colon screening.  No     Chief Complaint   Patient presents with    Irregular Heart Beat     6 month follow up

## 2018-04-19 NOTE — MR AVS SNAPSHOT
Morelia Cannon [address] [facility] 
[phone] Patient: Goldie Foster. MRN:  [id number] Visit Information Date & Time Provider Department Dept. Phone Encounter #  
 4/19/2018  9:00 AM Zhen Mares, 1024 [facility] [phone] [id number] Your Appointments 5/22/2018  9:15 AM  
PROCEDURE with PACEMAKER, [facility] Appt Note: 1 month f/u  
 [address]  
[phone] [address]  
  
    
 5/22/2018  9:15 AM  
ESTABLISHED PATIENT with Shayy Tomlin MD  
[facility] [facility]) Appt Note: 1 month f/u  
 [address] [facility]  
[phone] [address] [address]  
  
    
 6/19/2018  8:15 AM  
PROCEDURE with PACEMAKER, [facility]) Appt Note: BSC BiV ICD 3mo check, no landline [address]  
[phone] Upcoming Health Maintenance Date Due Hepatitis C Screening 1950 Pneumococcal 65+ Low/Medium Risk (1 of 2 - PCV13) 2/9/2014 GLAUCOMA SCREENING Q2Y 1/15/2017 COLONOSCOPY 5/14/2017 Influenza Age 5 to Adult 8/1/2017 MEDICARE YEARLY EXAM 3/14/2018 DTaP/Tdap/Td series (2 - Td) 7/16/2025 Allergies as of 4/19/2018  Review Complete On: 4/19/2018 By: Philipp Ovalles NP No Known Allergies Current Immunizations  Reviewed on 7/16/2015 Name Date Tdap 7/16/2015 Not reviewed this visit You Were Diagnosed With   
  
 Codes Comments Irregular heartbeat    -  Primary ICD-10-CM: I49.9 ICD-9-CM: 427.9 Cardiomyopathy, unspecified [facility]     ICD-10-CM: I42.9 ICD-9-CM: 425.4 Essential hypertension, benign     ICD-10-CM: I10 
ICD-9-CM: 401.1 Automatic implantable cardioverter-defibrillator in situ     ICD-10-CM: Z95.810 ICD-9-CM: V45.02 Paroxysmal atrial fibrillation (HCC)     ICD-10-CM: I48.0 ICD-9-CM: 427.31 Coronary artery disease involving native coronary artery of native heart without angina pectoris     ICD-10-CM: I25.10 ICD-9-CM: 414.01 Vitals BP Pulse Resp Height(growth percentile) Weight(growth percentile) SpO2  
 132/78 (BP 1 Location: Left arm, BP Patient Position: Sitting) 74 16 5' 8\" (1.727 m) 242 lb 3.2 oz (109.9 kg) 96% BMI Smoking Status 36.83 kg/m2 Never Smoker Vitals History BMI and BSA Data Body Mass Index Body Surface Area  
 36.83 kg/m 2 2.3 m 2 Preferred Pharmacy Pharmacy Name Phone [address], [address] [phone] Your Updated Medication List  
  
   
This list is accurate as of 4/19/18  9:51 AM.  Always use your most recent med list.  
  
  
  
  
 apixaban 5 mg tablet Commonly known as:  Garretson Sandeep Take 1 Tab by mouth two (2) times a day. captopril 12.5 mg tablet Commonly known as:  CAPOTEN Take 1 Tab by mouth two (2) times a day. carvedilol 25 mg tablet Commonly known as:  COREG  
TAKE 1 TABLET BY MOUTH TWO (2) TIMES DAILY (WITH MEALS). CENTRUM SILVER Tab tablet Generic drug:  multivitamins-minerals-lutein Take  by mouth. CRESTOR 20 mg tablet Generic drug:  rosuvastatin TAKE 1 TABLET BY MOUTH EVERY DAY  
  
 cyanocobalamin 1,000 mcg tablet Take 1,000 mcg by mouth daily. ezetimibe 10 mg tablet Commonly known as:  Eduin More TAKE 1 TABLET BY MOUTH EVERY DAY  
  
 furosemide 20 mg tablet Commonly known as:  LASIX TAKE 1 TABLET BY MOUTH EVERY DAY  
  
 LEVITRA 20 mg tablet Generic drug:  vardenafil Take 20 mg by mouth as needed. nitroglycerin 400 mcg/spray spray Commonly known as:  Kendrick Lemme 1 Spray by SubLINGual route every five (5) minutes as needed. omega-3-dha-epa-dpa-fish oil 1,050-1,200 mg Cap Take 1 Cap by mouth two (2) times a day. promethazine-dm Syrp Take 5 mL by mouth four (4) times daily as needed. tamsulosin 0.4 mg capsule Commonly known as:  FLOMAX TAKE 1 CAPSULE BY MOUTH EVERY DAY  
  
 VITAMIN D3 1,000 unit tablet Generic drug:  cholecalciferol Take  by mouth daily. We Performed the Following AMB POC EKG ROUTINE W/ 12 LEADS, INTER & REP [35710 CPT(R)] Introducing [facility]! [employer] introduces [employer] patient portal. Now you can access parts of your medical record, email your doctor's office, and request medication refills online. 1. In your internet browser, go to [email or web address] 2. Click on the First Time User? Click Here link in the Sign In box. You will see the New Member Sign Up page. 3. Enter your BlueSprig Access Code exactly as it appears below. You will not need to use this code after youve completed the sign-up process. If you do not sign up before the expiration date, you must request a new code. · Blue[employer]ig Access Code: [email or web address] Expires: 6/18/2018  8:03 AM 
 
4. Enter the last four digits of your Social Security Number (xxxx) and Date of Birth (mm/dd/yyyy) as indicated and click Submit. You will be taken to the next sign-up page. 5. Create a BlackJett ID. This will be your BlueSprig login ID and cannot be changed, so think of one that is secure and easy to remember. 6. Create a BlueSprig password. You can change your password at any time. 7. Enter your Password Reset Question and Answer. This can be used at a later time if you forget your password. 8. Enter your e-mail address. You will receive e-mail notification when new information is available in [address] 9. Click Sign Up. You can now view and download portions of your medical record. 10. Click the Download Summary menu link to download a portable copy of your medical information. If you have questions, please visit the Frequently Asked Questions section of the Your.MD website. Remember, Your.MD is NOT to be used for urgent needs. For medical emergencies, dial 911. Now available from your iPhone and Android! Please provide this summary of care documentation to your next provider. Your primary care clinician is listed as Jane Elias. If you have any questions after today's visit, please call [phone].

## 2018-05-11 DIAGNOSIS — I42.9 CARDIOMYOPATHY, UNSPECIFIED TYPE (HCC): ICD-10-CM

## 2018-05-14 RX ORDER — CARVEDILOL 25 MG/1
TABLET ORAL
Qty: 60 TAB | Refills: 1 | Status: SHIPPED | OUTPATIENT
Start: 2018-05-14 | End: 2018-07-16 | Stop reason: SDUPTHER

## 2018-05-21 RX ORDER — EZETIMIBE 10 MG/1
TABLET ORAL
Qty: 90 TAB | Refills: 1 | Status: SHIPPED | OUTPATIENT
Start: 2018-05-21 | End: 2018-10-08 | Stop reason: SDUPTHER

## 2018-05-22 ENCOUNTER — OFFICE VISIT (OUTPATIENT)
Dept: CARDIOLOGY CLINIC | Age: 68
End: 2018-05-22

## 2018-05-22 VITALS
WEIGHT: 233 LBS | DIASTOLIC BLOOD PRESSURE: 78 MMHG | HEIGHT: 68 IN | SYSTOLIC BLOOD PRESSURE: 110 MMHG | BODY MASS INDEX: 35.31 KG/M2 | RESPIRATION RATE: 18 BRPM | HEART RATE: 60 BPM | OXYGEN SATURATION: 96 %

## 2018-05-22 DIAGNOSIS — I48.0 PAROXYSMAL ATRIAL FIBRILLATION (HCC): ICD-10-CM

## 2018-05-22 DIAGNOSIS — Z95.810 BIVENTRICULAR ICD (IMPLANTABLE CARDIOVERTER-DEFIBRILLATOR) IN PLACE: ICD-10-CM

## 2018-05-22 DIAGNOSIS — I25.10 CORONARY ARTERY DISEASE INVOLVING NATIVE CORONARY ARTERY OF NATIVE HEART WITHOUT ANGINA PECTORIS: ICD-10-CM

## 2018-05-22 DIAGNOSIS — Z95.810 AUTOMATIC IMPLANTABLE CARDIOVERTER-DEFIBRILLATOR IN SITU: Primary | ICD-10-CM

## 2018-05-22 DIAGNOSIS — I42.9 CARDIOMYOPATHY, UNSPECIFIED TYPE (HCC): ICD-10-CM

## 2018-05-22 DIAGNOSIS — I47.20 VT (VENTRICULAR TACHYCARDIA): ICD-10-CM

## 2018-05-22 DIAGNOSIS — I42.9 CARDIOMYOPATHY, UNSPECIFIED TYPE (HCC): Primary | ICD-10-CM

## 2018-05-22 NOTE — PROGRESS NOTES
1. Have you been to the ER, urgent care clinic since your last visit? Hospitalized since your last visit? No    2. Have you seen or consulted any other health care providers outside of the [address] [place] [address] since your last visit? Include any pap smears or colon screening. No    Chief Complaint   Patient presents with    Pacemaker Check     1 mo appt. Recent shock. Denied current symptoms.

## 2018-05-22 NOTE — PROGRESS NOTES
Subjective:      Ellie Nieves is a 70 y.o. male is here for follow up s/p BIV ICD and for AF burden check. He is feeling well. The patient denies chest pain/ shortness of breath, orthopnea, PND, LE edema, palpitations, syncope, presyncope or fatigue. Patient Active Problem List    Diagnosis Date Noted    Severe obesity (BMI 35.0-39. 9) with comorbidity ([facility] 04/19/2018    Low back pain 07/02/2013    Automatic implantable cardioverter-defibrillator in situ 06/11/2012    GERD (gastroesophageal reflux disease) 05/14/2012    Hematochezia 05/14/2012    Screen for colon cancer 05/14/2012    CAD (coronary artery disease) 02/16/2011    Mixed hyperlipidemia 02/16/2011    Cardiomyopathy ([facility] 02/16/2011    Esophageal reflux 02/16/2011    Encounter for long-term (current) use of other medications 02/16/2011    Nocturia 02/16/2011    Abnormal PSA 02/16/2011    Essential hypertension, benign 02/16/2011      Annmarie Carreon MD  Past Medical History:   Diagnosis Date    Abnormal PSA 2/16/2011    CAD (coronary artery disease) 2/16/2011    Cardiomyopathy ([facility] [address] 2/16/2011    Encounter for long-term (current) use of other medications 2/16/2011    Esophageal reflux 2/16/2011    Essential hypertension, benign 2/16/2011    GERD (gastroesophageal reflux disease) 5/14/2012    Heart attack ([facility] [address]     Low back pain 7/2/2013    Mixed hyperlipidemia 2/16/2011    Nocturia 2/16/2011    Pacemaker       Past Surgical History:   Procedure Laterality Date    HX CERVICAL FUSION  1997    c4,5,6    OR COLONOSCOPY FLX DX W/COLLJ SPEC WHEN PFRMD  5/14/2012         OR EGD TRANSORAL BIOPSY SINGLE/MULTIPLE  5/14/2012         REMOVAL GALLBLADDER       No Known Allergies   Family History   Problem Relation Age of Onset    Lung Disease Mother     Alcohol abuse Father     negative for cardiac disease  Social History     Social History    Marital status:      Spouse name: N/A    Number of children: N/A    Years of education: N/A     Social History Main Topics    Smoking status: Never Smoker    Smokeless tobacco: Never Used    Alcohol use No    Drug use: None    Sexual activity: Not Asked     Other Topics Concern    None     Social History Narrative     Current Outpatient Prescriptions   Medication Sig    OTHER     ezetimibe (ZETIA) 10 mg tablet TAKE 1 TABLET BY MOUTH EVERY DAY    carvedilol (COREG) 25 mg tablet TAKE 1 TABLET BY MOUTH TWO (2) TIMES DAILY (WITH MEALS).  apixaban (ELIQUIS) 5 mg tablet Take 1 Tab by mouth two (2) times a day.  furosemide (LASIX) 20 mg tablet TAKE 1 TABLET BY MOUTH EVERY DAY    tamsulosin (FLOMAX) 0.4 mg capsule TAKE 1 CAPSULE BY MOUTH EVERY DAY    captopril (CAPOTEN) 12.5 mg tablet Take 1 Tab by mouth two (2) times a day.  CRESTOR 20 mg tablet TAKE 1 TABLET BY MOUTH EVERY DAY    nitroglycerin (NITROLINGUAL) 400 mcg/spray spray 1 Spray by SubLINGual route every five (5) minutes as needed.  cyanocobalamin 1,000 mcg tablet Take 1,000 mcg by mouth daily.  omega-3-dha-epa-dpa-fish oil 1,050-1,200 mg cap Take 1 Cap by mouth two (2) times a day.  D-METHORPHAN HB/PROMETH HCL (PROMETHAZINE-DM) Syrp Take 5 mL by mouth four (4) times daily as needed.  cholecalciferol, vitamin d3, (VITAMIN D) 1,000 unit tablet Take  by mouth daily.  multivitamins-minerals-lutein (CENTRUM SILVER) Tab Take  by mouth.  LEVITRA 20 mg tablet Take 20 mg by mouth as needed. No current facility-administered medications for this visit. Vitals:    05/22/18 0855   BP: 110/78   Pulse: 60   Resp: 18   SpO2: 96%   Weight: 233 lb (105.7 kg)   Height: 5' 8\" (1.727 m)       I have reviewed the nurses notes, vitals, problem list, allergy list, medical history, family, social history and medications. Review of Symptoms:    General: Pt denies excessive weight gain or loss.  Pt is able to conduct ADL's  HEENT: Denies blurred vision, headaches, epistaxis and difficulty swallowing. Respiratory: Denies shortness of breath, WING, wheezing or stridor. Cardiovascular: Denies precordial pain, palpitations, edema or PND  Gastrointestinal: Denies poor appetite, indigestion, abdominal pain or blood in stool  Urinary: Denies dysuria, pyuria  Musculoskeletal: Denies pain or swelling from muscles or joints  Neurologic: Denies tremor, paresthesias, or sensory motor disturbance  Skin: Denies rash, itching or texture change. Psych: Denies depression      Physical Exam:      General: Well developed, in no acute distress. HEENT: Eyes - PERRL, no jvd  Heart:  Normal S1/S2 negative S3 or S4. Regular, no murmur, gallop or rub.   Respiratory: Clear bilaterally x 4, no wheezing or rales  Abdomen:   Soft, non-tender, bowel sounds are active.   Extremities:  No edema, normal cap refill, no cyanosis. Musculoskeletal: No clubbing  Neuro: A&Ox3, speech clear, gait stable. Skin: Skin color is normal. No rashes or lesions. Non diaphoretic  Vascular: 2+ pulses symmetric in all extremities    Cardiographics    Ekg: AV paced  BSC BIV ICD: 84% AP, 95% RVP, 99% LVP  1 episode of 4.9 hours      No results found for this or any previous visit.       Lab Results   Component Value Date/Time    WBC 5.0 05/08/2017 01:54 PM    HGB 14.9 05/08/2017 01:54 PM    HCT 42.0 05/08/2017 01:54 PM    PLATELET 788 (L) 71/47/6684 01:54 PM    MCV 90 05/08/2017 01:54 PM      Lab Results   Component Value Date/Time    Sodium 143 05/08/2017 01:54 PM    Potassium 3.8 05/08/2017 01:54 PM    Chloride 104 05/08/2017 01:54 PM    CO2 20 05/08/2017 01:54 PM    Anion gap 8 07/29/2010 02:23 PM    Glucose 136 (H) 05/08/2017 01:54 PM    BUN 13 05/08/2017 01:54 PM    Creatinine 1.31 (H) 05/08/2017 01:54 PM    BUN/Creatinine ratio 10 05/08/2017 01:54 PM    GFR est AA 64 05/08/2017 01:54 PM    GFR est non-AA 56 (L) 05/08/2017 01:54 PM    Calcium 9.2 05/08/2017 01:54 PM    Bilirubin, total 1.3 (H) 05/08/2017 01:54 PM    AST (SGOT) 25 05/08/2017 01:54 PM    Alk. phosphatase 35 (L) 05/08/2017 01:54 PM    Protein, total 6.0 05/08/2017 01:54 PM    Albumin 4.3 05/08/2017 01:54 PM    Globulin 2.3 07/29/2010 02:23 PM    A-G Ratio 2.5 (H) 05/08/2017 01:54 PM    ALT (SGPT) 32 05/08/2017 01:54 PM         Assessment:     Assessment:        ICD-10-CM ICD-9-CM    1. Cardiomyopathy, unspecified type (HCC) I42.9 425.4 AMB POC EKG ROUTINE W/ 12 LEADS, INTER & REP   2. Coronary artery disease involving native coronary artery of native heart without angina pectoris I25.10 414.01    3. Paroxysmal atrial fibrillation (HCC) I48.0 427.31    4. Biventricular ICD (implantable cardioverter-defibrillator) in place Z95.810 V45.02    5. VT (ventricular tachycardia) ([facility] [address] I47.2 427.1      Orders Placed This Encounter    AMB POC EKG ROUTINE W/ 12 LEADS, INTER & REP     Order Specific Question:   Reason for Exam:     Answer:   routine    OTHER        Plan:   Mr. Binta Lee is here for device follow up s/p BIV ICD upgrade 5/2017 and follow up of AF burden after eliminated caffeine intake. Device interrogation today demonstrates one episode of AF 4.9 hours long. He is BIV paced 99% for his cardiomyopathy and chf. He also received ATP for VF with a 41J shock on 4/11/2018. He denies recent illness, or symptoms aside from dizziness which occurred prior to shock. He is tolerating Eliquis. Further medical therapy is prohibited by blood pressure. Of note, he has lost 7 pounds through diet changes. Will set him up for ischemic workup with Dr. Harman Sanabria. He has not been taking full dose of Coreg BID. Will have him increase his Coreg to 25mg BID and follow up with Dr. Tanya Singletary in one month. Continue medical management for VT, AF, CAD. Thank you for allowing me to participate in Goldie FosterCitlali 's care.     MD Shayy Brasher MD, Jo Mitchell

## 2018-05-22 NOTE — MR AVS SNAPSHOT
[address] [facility] 
[phone] Patient: Sen Franklin. MRN:  [id number] Visit Information Date & Time Provider Department Dept. Phone Encounter #  
 5/22/2018  9:15 AM Johanna Babakon [facility] [phone] [id number] Your Appointments 6/19/2018  8:15 AM  
PROCEDURE with PACEMAKER, [facility] Appt Note: BSC BiV ICD 3mo check, no landline [id number] [facility]  
[phone] [address] [facility]  
  
    
 6/19/2018  9:30 AM  
ESTABLISHED PATIENT with Jeff Driver MD  
[facility]) Appt Note: 1 month follow up [id number] [facility]  
[phone] [id number] [facility]  
  
    
 6/22/2018  3:30 PM  
ESTABLISHED PATIENT with Alonso Sierra MD  
[facility]) Appt Note: . [id number] [facility]  
[phone] [id number] [facility] Upcoming Health Maintenance Date Due Hepatitis C Screening 1950 Pneumococcal 65+ Low/Medium Risk (1 of 2 - PCV13) 2/9/2014 GLAUCOMA SCREENING Q2Y 1/15/2017 COLONOSCOPY 5/14/2017 MEDICARE YEARLY EXAM 3/14/2018 Influenza Age 5 to Adult 8/1/2018 DTaP/Tdap/Td series (2 - Td) 7/16/2025 Allergies as of 5/22/2018  Review Complete On: 5/22/2018 By: Karyna Dubois NP No Known Allergies Current Immunizations  Reviewed on 7/16/2015 Name Date Tdap 7/16/2015 Not reviewed this visit You Were Diagnosed With   
  
 Codes Comments Automatic implantable cardioverter-defibrillator in situ    -  Primary ICD-10-CM: Z95.810 ICD-9-CM: V45.02   
 Cardiomyopathy, unspecified type ([facility]     ICD-10-CM: I42.9 ICD-9-CM: 425.4 Vitals Smoking Status Never Smoker Preferred Pharmacy Pharmacy Name Phone [address], [address] [phone] Your Updated Medication List  
  
   
This list is accurate as of 5/22/18  9:47 AM.  Always use your most recent med list.  
  
  
  
  
 apixaban 5 mg tablet Commonly known as:  Verl Nailer Take 1 Tab by mouth two (2) times a day. captopril 12.5 mg tablet Commonly known as:  CAPOTEN Take 1 Tab by mouth two (2) times a day. carvedilol 25 mg tablet Commonly known as:  COREG  
TAKE 1 TABLET BY MOUTH TWO (2) TIMES DAILY (WITH MEALS). CENTRUM SILVER Tab tablet Generic drug:  multivitamins-minerals-lutein Take  by mouth. CRESTOR 20 mg tablet Generic drug:  rosuvastatin TAKE 1 TABLET BY MOUTH EVERY DAY  
  
 cyanocobalamin 1,000 mcg tablet Take 1,000 mcg by mouth daily. ezetimibe 10 mg tablet Commonly known as:  Carmell Media TAKE 1 TABLET BY MOUTH EVERY DAY  
  
 furosemide 20 mg tablet Commonly known as:  LASIX TAKE 1 TABLET BY MOUTH EVERY DAY  
  
 LEVITRA 20 mg tablet Generic drug:  vardenafil Take 20 mg by mouth as needed. nitroglycerin 400 mcg/spray spray Commonly known as:  NITROLINGUAL  
1 [place] by SubLINGual route every five (5) minutes as needed. omega-3-dha-epa-dpa-fish oil 1,050-1,200 mg Cap Take 1 Cap by mouth two (2) times a day. OTHER  
  
 promethazine-dm Syrp Take 5 mL by mouth four (4) times daily as needed. tamsulosin 0.4 mg capsule Commonly known as:  FLOMAX TAKE 1 CAPSULE BY MOUTH EVERY DAY  
  
 VITAMIN D3 1,000 unit tablet Generic drug:  cholecalciferol Take  by mouth daily. We Performed the Following ICD CHECK INCLUDING PROGRAMING [CPE2043 Custom] Introducing [address]! [employer] introduces [facility] patient portal. Now you can access parts of your medical record, email your doctor's office, and request medication refills online. 1. In your internet browser, go to [email or web address] 2. Click on the First Time User? Click Here link in the Sign In box. You will see the New Member Sign Up page. 3. Enter your [facility] Access Code exactly as it appears below. You will not need to use this code after youve completed the sign-up process. If you do not sign up before the expiration date, you must request a new code. · [facility] Access Code: [email or web address] Expires: 6/18/2018  8:03 AM 
 
4. Enter the last four digits of your Social Security Number (xxxx) and Date of Birth (mm/dd/yyyy) as indicated and click Submit. You will be taken to the next sign-up page. 5. Create a [facility] ID. This will be your [facility] login ID and cannot be changed, so think of one that is secure and easy to remember. 6. Create a [facility] password. You can change your password at any time. 7. Enter your Password Reset Question and Answer. This can be used at a later time if you forget your password. 8. Enter your e-mail address. You will receive e-mail notification when new information is available in [address] 9. Click Sign Up. You can now view and download portions of your medical record. 10. Click the Download Summary menu link to download a portable copy of your medical information. If you have questions, please visit the Frequently Asked Questions section of the [employer] website. Remember, [facility] is NOT to be used for urgent needs. For medical emergencies, dial 911. Now available from your iPhone and Android! Please provide this summary of care documentation to your next provider. Your primary care clinician is listed as Lana Perea. If you have any questions after today's visit, please call [phone].

## 2018-06-08 RX ORDER — TAMSULOSIN HYDROCHLORIDE 0.4 MG/1
CAPSULE ORAL
Qty: 30 CAP | Refills: 4 | Status: SHIPPED | OUTPATIENT
Start: 2018-06-08 | End: 2018-11-12 | Stop reason: SDUPTHER

## 2018-06-19 ENCOUNTER — CLINICAL SUPPORT (OUTPATIENT)
Dept: CARDIOLOGY CLINIC | Age: 68
End: 2018-06-19

## 2018-06-19 ENCOUNTER — OFFICE VISIT (OUTPATIENT)
Dept: CARDIOLOGY CLINIC | Age: 68
End: 2018-06-19

## 2018-06-19 VITALS
SYSTOLIC BLOOD PRESSURE: 110 MMHG | HEART RATE: 80 BPM | WEIGHT: 229.4 LBS | RESPIRATION RATE: 18 BRPM | OXYGEN SATURATION: 97 % | DIASTOLIC BLOOD PRESSURE: 62 MMHG | HEIGHT: 68 IN | BODY MASS INDEX: 34.77 KG/M2

## 2018-06-19 DIAGNOSIS — Z95.810 AUTOMATIC IMPLANTABLE CARDIOVERTER-DEFIBRILLATOR IN SITU: ICD-10-CM

## 2018-06-19 DIAGNOSIS — I49.9 IRREGULAR HEARTBEAT: Primary | ICD-10-CM

## 2018-06-19 DIAGNOSIS — I10 ESSENTIAL HYPERTENSION, BENIGN: ICD-10-CM

## 2018-06-19 DIAGNOSIS — I42.9 CARDIOMYOPATHY, UNSPECIFIED TYPE (HCC): ICD-10-CM

## 2018-06-19 DIAGNOSIS — I25.10 CORONARY ARTERY DISEASE INVOLVING NATIVE CORONARY ARTERY OF NATIVE HEART WITHOUT ANGINA PECTORIS: ICD-10-CM

## 2018-06-19 DIAGNOSIS — Z95.810 PRESENCE OF AUTOMATIC CARDIOVERTER/DEFIBRILLATOR (AICD): Primary | ICD-10-CM

## 2018-06-19 NOTE — PROGRESS NOTES
See scanned [facility] Bi-V ICD report in chart. Follow up in 3 months for device check. Chargeable visit.

## 2018-06-19 NOTE — PROGRESS NOTES
Subjective:      Sen Joiner is a 70 y.o. male is here for follow up of AF burden and device check. He is feeling well. The patient denies chest pain/ shortness of breath, orthopnea, PND, LE edema, palpitations, syncope, presyncope or fatigue. Patient Active Problem List    Diagnosis Date Noted    Severe obesity (BMI 35.0-39. 9) with comorbidity ([facility] [address] 04/19/2018    Low back pain 07/02/2013    Automatic implantable cardioverter-defibrillator in situ 06/11/2012    GERD (gastroesophageal reflux disease) 05/14/2012    Hematochezia 05/14/2012    Screen for colon cancer 05/14/2012    CAD (coronary artery disease) 02/16/2011    Mixed hyperlipidemia 02/16/2011    Cardiomyopathy ([facility] [address] 02/16/2011    Esophageal reflux 02/16/2011    Encounter for long-term (current) use of other medications 02/16/2011    Nocturia 02/16/2011    Abnormal PSA 02/16/2011    Essential hypertension, benign 02/16/2011      Uyen Perkins MD  Past Medical History:   Diagnosis Date    Abnormal PSA 2/16/2011    CAD (coronary artery disease) 2/16/2011    Cardiomyopathy ([facility] [address] 2/16/2011    Encounter for long-term (current) use of other medications 2/16/2011    Esophageal reflux 2/16/2011    Essential hypertension, benign 2/16/2011    GERD (gastroesophageal reflux disease) 5/14/2012    Heart attack ([facility] [address]     Low back pain 7/2/2013    Mixed hyperlipidemia 2/16/2011    Nocturia 2/16/2011    Pacemaker       Past Surgical History:   Procedure Laterality Date    HX CERVICAL FUSION  1997    c4,5,6    NE COLONOSCOPY FLX DX W/COLLJ SPEC WHEN PFRMD  5/14/2012         NE EGD TRANSORAL BIOPSY SINGLE/MULTIPLE  5/14/2012         REMOVAL GALLBLADDER       No Known Allergies   Family History   Problem Relation Age of Onset    Lung Disease Mother     Alcohol abuse Father     negative for cardiac disease  Social History     Social History    Marital status:      Spouse name: N/A    Number of children: N/A  Years of education: N/A     Social History Main Topics    Smoking status: Never Smoker    Smokeless tobacco: Never Used    Alcohol use No    Drug use: None    Sexual activity: Not Asked     Other Topics Concern    None     Social History Narrative     Current Outpatient Prescriptions   Medication Sig    tamsulosin (FLOMAX) 0.4 mg capsule TAKE 1 CAPSULE BY MOUTH EVERY DAY    OTHER     ezetimibe (ZETIA) 10 mg tablet TAKE 1 TABLET BY MOUTH EVERY DAY    carvedilol (COREG) 25 mg tablet TAKE 1 TABLET BY MOUTH TWO (2) TIMES DAILY (WITH MEALS).  apixaban (ELIQUIS) 5 mg tablet Take 1 Tab by mouth two (2) times a day.  furosemide (LASIX) 20 mg tablet TAKE 1 TABLET BY MOUTH EVERY DAY    captopril (CAPOTEN) 12.5 mg tablet Take 1 Tab by mouth two (2) times a day.  CRESTOR 20 mg tablet TAKE 1 TABLET BY MOUTH EVERY DAY    nitroglycerin (NITROLINGUAL) 400 mcg/spray spray 1 Spray by SubLINGual route every five (5) minutes as needed.  cyanocobalamin 1,000 mcg tablet Take 1,000 mcg by mouth daily.  omega-3-dha-epa-dpa-fish oil 1,050-1,200 mg cap Take 1 Cap by mouth two (2) times a day.  D-METHORPHAN HB/PROMETH HCL (PROMETHAZINE-DM) Syrp Take 5 mL by mouth four (4) times daily as needed.  cholecalciferol, vitamin d3, (VITAMIN D) 1,000 unit tablet Take  by mouth daily.  multivitamins-minerals-lutein (CENTRUM SILVER) Tab Take  by mouth.  LEVITRA 20 mg tablet Take 20 mg by mouth as needed. No current facility-administered medications for this visit. Vitals:    06/19/18 0853   BP: 110/62   Pulse: 80   Resp: 18   SpO2: 97%   Weight: 229 lb 6.4 oz (104.1 kg)   Height: 5' 8\" (1.727 m)       I have reviewed the nurses notes, vitals, problem list, allergy list, medical history, family, social history and medications. Review of Symptoms:    General: Pt denies excessive weight gain or loss.  Pt is able to conduct ADL's  HEENT: Denies blurred vision, headaches, epistaxis and difficulty swallowing. Respiratory: Denies shortness of breath, WING, wheezing or stridor. Cardiovascular: Denies precordial pain, palpitations, edema or PND  Gastrointestinal: Denies poor appetite, indigestion, abdominal pain or blood in stool  Urinary: Denies dysuria, pyuria  Musculoskeletal: Denies pain or swelling from muscles or joints  Neurologic: Denies tremor, paresthesias, or sensory motor disturbance  Skin: Denies rash, itching or texture change. Psych: Denies depression      Physical Exam:      General: Well developed, in no acute distress. HEENT: Eyes - PERRL, no jvd  Heart:  Normal S1/S2 negative S3 or S4. Regular, no murmur, gallop or rub.   Respiratory: Clear bilaterally x 4, no wheezing or rales  Abdomen:   Soft, non-tender, bowel sounds are active.   Extremities:  No edema, normal cap refill, no cyanosis. Musculoskeletal: No clubbing  Neuro: A&Ox3, speech clear, gait stable. Skin: Skin color is normal. No rashes or lesions. Non diaphoretic  Vascular: 2+ pulses symmetric in all extremities    Cardiographics    Ekg: dual chamber pacing    No results found for this or any previous visit. Lab Results   Component Value Date/Time    WBC 5.0 05/08/2017 01:54 PM    HGB 14.9 05/08/2017 01:54 PM    HCT 42.0 05/08/2017 01:54 PM    PLATELET 956 (L) 53/73/3310 01:54 PM    MCV 90 05/08/2017 01:54 PM      Lab Results   Component Value Date/Time    Sodium 143 05/08/2017 01:54 PM    Potassium 3.8 05/08/2017 01:54 PM    Chloride 104 05/08/2017 01:54 PM    CO2 20 05/08/2017 01:54 PM    Anion gap 8 07/29/2010 02:23 PM    Glucose 136 (H) 05/08/2017 01:54 PM    BUN 13 05/08/2017 01:54 PM    Creatinine 1.31 (H) 05/08/2017 01:54 PM    BUN/Creatinine ratio 10 05/08/2017 01:54 PM    GFR est AA 64 05/08/2017 01:54 PM    GFR est non-AA 56 (L) 05/08/2017 01:54 PM    Calcium 9.2 05/08/2017 01:54 PM    Bilirubin, total 1.3 (H) 05/08/2017 01:54 PM    AST (SGOT) 25 05/08/2017 01:54 PM    Alk.  phosphatase 35 (L) 05/08/2017 01:54 PM Protein, total 6.0 05/08/2017 01:54 PM    Albumin 4.3 05/08/2017 01:54 PM    Globulin 2.3 07/29/2010 02:23 PM    A-G Ratio 2.5 (H) 05/08/2017 01:54 PM    ALT (SGPT) 32 05/08/2017 01:54 PM         Assessment:     Assessment:        ICD-10-CM ICD-9-CM    1. Irregular heartbeat I49.9 427.9 AMB POC EKG ROUTINE W/ 12 LEADS, INTER & REP   2. Cardiomyopathy, unspecified type ([address] I42.9 425.4    3. Automatic implantable cardioverter-defibrillator in situ Z95.810 V45.02    4. Essential hypertension, benign I10 401.1    5. Coronary artery disease involving native coronary artery of native heart without angina pectoris I25.10 414.01      Orders Placed This Encounter    AMB POC EKG ROUTINE W/ 12 LEADS, INTER & REP     Order Specific Question:   Reason for Exam:     Answer:   routine        Plan:   Mr. Scarlett Smith is here for device check and follow up of AF. He is doing well and his device interrogation demonstrates no further AF since increase in Coreg dose to 25mg BID. He's pacing 92% AP and 93% RVP/98% LVP. EKG shows dual chamber pacing. He is planned to see Dr. Yvon Lan for ischemic evaluation after VF shock last month. Continue current medical therapy and follow up with Dr. Ashley Rutledge in 3 months. Continue medical management for CAD, HTN. Thank you for allowing me to participate in Prabhu LemaCitlali 's care. Jerman Bearden NP    Patient seen and examined by me with nurse practitioner. I personally performed all components of the history, physical, and medical decision making and agree with the assessment and plan with minor modifications as noted.      Leonarda Kohler MD, Anabel Hoffman

## 2018-06-19 NOTE — PROGRESS NOTES
Chief Complaint   Patient presents with    Irregular Heart Beat     1 month follow up     1. Have you been to the ER, urgent care clinic since your last visit? Hospitalized since your last visit? No    2. Have you seen or consulted any other health care providers outside of the [facility] since your last visit? Include any pap smears or colon screening.  No

## 2018-06-22 ENCOUNTER — OFFICE VISIT (OUTPATIENT)
Dept: CARDIOLOGY CLINIC | Age: 68
End: 2018-06-22

## 2018-06-22 VITALS
BODY MASS INDEX: 35.09 KG/M2 | SYSTOLIC BLOOD PRESSURE: 110 MMHG | HEART RATE: 60 BPM | WEIGHT: 231.5 LBS | HEIGHT: 68 IN | RESPIRATION RATE: 20 BRPM | DIASTOLIC BLOOD PRESSURE: 60 MMHG | OXYGEN SATURATION: 94 %

## 2018-06-22 DIAGNOSIS — E78.2 MIXED HYPERLIPIDEMIA: ICD-10-CM

## 2018-06-22 DIAGNOSIS — I10 ESSENTIAL HYPERTENSION, BENIGN: ICD-10-CM

## 2018-06-22 DIAGNOSIS — I48.0 PAF (PAROXYSMAL ATRIAL FIBRILLATION) (HCC): ICD-10-CM

## 2018-06-22 DIAGNOSIS — I42.9 CARDIOMYOPATHY, UNSPECIFIED TYPE (HCC): ICD-10-CM

## 2018-06-22 DIAGNOSIS — I25.10 CORONARY ARTERY DISEASE INVOLVING NATIVE CORONARY ARTERY OF NATIVE HEART WITHOUT ANGINA PECTORIS: Primary | ICD-10-CM

## 2018-06-22 NOTE — PROGRESS NOTES
[address] [place] [address]  [phone]     Subjective:      Shane García. is a 70 y.o. male is here for long f/u. Has followed with DR Michelle Lopez since. He reports device shock in 4/18, denies any further episode or cardiac symptoms. Specifially denies chest pain/ shortness of breath, orthopnea, PND, LE edema, palpitations, syncope, or presyncope. Patient Active Problem List    Diagnosis Date Noted    Severe obesity (BMI 35.0-39. 9) with comorbidity ([address] 04/19/2018    Low back pain 07/02/2013    Automatic implantable cardioverter-defibrillator in situ 06/11/2012    GERD (gastroesophageal reflux disease) 05/14/2012    Hematochezia 05/14/2012    Screen for colon cancer 05/14/2012    CAD (coronary artery disease) 02/16/2011    Mixed hyperlipidemia 02/16/2011    Cardiomyopathy ([address] 02/16/2011    Esophageal reflux 02/16/2011    Encounter for long-term (current) use of other medications 02/16/2011    Nocturia 02/16/2011    Abnormal PSA 02/16/2011    Essential hypertension, benign 02/16/2011      Gisele Car MD  Past Medical History:   Diagnosis Date    Abnormal PSA 2/16/2011    CAD (coronary artery disease) 2/16/2011    Cardiomyopathy ([address] 2/16/2011    Encounter for long-term (current) use of other medications 2/16/2011    Esophageal reflux 2/16/2011    Essential hypertension, benign 2/16/2011    GERD (gastroesophageal reflux disease) 5/14/2012    Heart attack ([address]     Low back pain 7/2/2013    Mixed hyperlipidemia 2/16/2011    Nocturia 2/16/2011    Pacemaker       Past Surgical History:   Procedure Laterality Date    HX CERVICAL FUSION  1997    c4,5,6    DE COLONOSCOPY FLX DX W/COLLJ SPEC WHEN PFRMD  5/14/2012         DE EGD TRANSORAL BIOPSY SINGLE/MULTIPLE  5/14/2012         REMOVAL GALLBLADDER       No Known Allergies   Family History   Problem Relation Age of Onset    Lung Disease Mother     Alcohol abuse Father       Social History     Social History    Marital status:      Spouse name: N/A    Number of children: N/A    Years of education: N/A     Occupational History    Not on file. Social History Main Topics    Smoking status: Never Smoker    Smokeless tobacco: Never Used    Alcohol use No    Drug use: Not on file    Sexual activity: Not on file     Other Topics Concern    Not on file     Social History Narrative      Current Outpatient Prescriptions   Medication Sig    tamsulosin (FLOMAX) 0.4 mg capsule TAKE 1 CAPSULE BY MOUTH EVERY DAY    OTHER     ezetimibe (ZETIA) 10 mg tablet TAKE 1 TABLET BY MOUTH EVERY DAY    carvedilol (COREG) 25 mg tablet TAKE 1 TABLET BY MOUTH TWO (2) TIMES DAILY (WITH MEALS).  apixaban (ELIQUIS) 5 mg tablet Take 1 Tab by mouth two (2) times a day.  furosemide (LASIX) 20 mg tablet TAKE 1 TABLET BY MOUTH EVERY DAY    LEVITRA 20 mg tablet Take 20 mg by mouth as needed.  captopril (CAPOTEN) 12.5 mg tablet Take 1 Tab by mouth two (2) times a day.  CRESTOR 20 mg tablet TAKE 1 TABLET BY MOUTH EVERY DAY    nitroglycerin (NITROLINGUAL) 400 mcg/spray spray 1 Spray by SubLINGual route every five (5) minutes as needed.  cyanocobalamin 1,000 mcg tablet Take 1,000 mcg by mouth daily.  omega-3-dha-epa-dpa-fish oil 1,050-1,200 mg cap Take 1 Cap by mouth two (2) times a day.  D-METHORPHAN HB/PROMETH HCL (PROMETHAZINE-DM) Syrp Take 5 mL by mouth four (4) times daily as needed.  cholecalciferol, vitamin d3, (VITAMIN D) 1,000 unit tablet Take  by mouth daily.  multivitamins-minerals-lutein (CENTRUM SILVER) Tab Take  by mouth. No current facility-administered medications for this visit.           Review of Symptoms:  11 systems reviewed, negative other than as stated in the HPI    Physical ExamPhysical Exam:    Vitals:    06/22/18 1504 06/22/18 1513   BP: 110/62 110/60   Pulse: 60    Resp: 20    SpO2: 94%    Weight: 231 lb 8 oz (105 kg)    Height: 5' 8\" (1.727 m) Body mass index is 35.2 kg/(m^2). General PE   Gen:  NAD  Mental Status - Alert. General Appearance - Not in acute distress. Chest and Lung Exam   Inspection: Accessory muscles - No use of accessory muscles in breathing. Auscultation:   Breath sounds: - Normal.   Cardiovascular   Inspection: Jugular vein - Bilateral - Inspection Normal.   Palpation/Percussion:   Apical Impulse: - Normal.   Auscultation: Rhythm - Regular. Heart Sounds - S1 WNL and S2 WNL. No S3 or S4. Murmurs & Other Heart Sounds: Auscultation of the heart reveals - No Murmurs. Peripheral Vascular   Upper Extremity: Inspection - Bilateral - No Cyanotic nailbeds or Digital clubbing. Lower Extremity:   Palpation: Edema - Bilateral - No edema. Abdomen:   Soft, non-tender, bowel sounds are active.   Neuro: A&O times 3, CN and motor grossly WNL    Labs:   Lab Results   Component Value Date/Time    Cholesterol, total 128 07/16/2015 11:18 AM    Cholesterol, total 123 08/25/2014 08:56 AM    Cholesterol, total 132 02/11/2014 08:16 AM    Cholesterol, total 130 07/02/2013 11:11 AM    Cholesterol, total 139 05/17/2011 08:34 AM    HDL Cholesterol 52 07/16/2015 11:18 AM    HDL Cholesterol 40 08/25/2014 08:56 AM    HDL Cholesterol 48 02/11/2014 08:16 AM    HDL Cholesterol 43 07/02/2013 11:11 AM    HDL Cholesterol 52 05/17/2011 08:34 AM    LDL, calculated 59 07/16/2015 11:18 AM    LDL, calculated 63 08/25/2014 08:56 AM    LDL, calculated 68 02/11/2014 08:16 AM    LDL, calculated 68 07/02/2013 11:11 AM    LDL, calculated 60 05/17/2011 08:34 AM    Triglyceride 87 07/16/2015 11:18 AM    Triglyceride 101 08/25/2014 08:56 AM    Triglyceride 78 02/11/2014 08:16 AM    Triglyceride 97 07/02/2013 11:11 AM    Triglyceride 133 05/17/2011 08:34 AM     No results found for: CPK, CPKX, CPX  Lab Results   Component Value Date/Time    Sodium 143 05/08/2017 01:54 PM    Potassium 3.8 05/08/2017 01:54 PM    Chloride 104 05/08/2017 01:54 PM    CO2 20 05/08/2017 01:54 PM Anion gap 8 07/29/2010 02:23 PM    Glucose 136 (H) 05/08/2017 01:54 PM    BUN 13 05/08/2017 01:54 PM    Creatinine 1.31 (H) 05/08/2017 01:54 PM    BUN/Creatinine ratio 10 05/08/2017 01:54 PM    GFR est AA 64 05/08/2017 01:54 PM    GFR est non-AA 56 (L) 05/08/2017 01:54 PM    Calcium 9.2 05/08/2017 01:54 PM    Bilirubin, total 1.3 (H) 05/08/2017 01:54 PM    AST (SGOT) 25 05/08/2017 01:54 PM    Alk. phosphatase 35 (L) 05/08/2017 01:54 PM    Protein, total 6.0 05/08/2017 01:54 PM    Albumin 4.3 05/08/2017 01:54 PM    Globulin 2.3 07/29/2010 02:23 PM    A-G Ratio 2.5 (H) 05/08/2017 01:54 PM    ALT (SGPT) 32 05/08/2017 01:54 PM       EKG:  n/a     Assessment:     Assessment:      1. Coronary artery disease involving native coronary artery of native heart without angina pectoris    2. Cardiomyopathy, unspecified type ([facility]    3. Mixed hyperlipidemia    4. Essential hypertension, benign    5. PAF (paroxysmal atrial fibrillation) ([facility]        Orders Placed This Encounter    LEXISCAN/CARDIOLITE, Clinic Performed     Standing Status:   Future     Standing Expiration Date:   12/22/2018     Order Specific Question:   Reason for Exam:     Answer:   VF shock        Plan:     Pt presents for long f/u    PAF  On BB - per Dr Riccardo Vora, no further episode of AF since uptitration of Coreg to 25 mg BID  On Eliquis for [address] [place] [address]; tolerating well      HFrEF, post BIV-ICD  Normal EF, mild MR/TR per Echo in 9/17  VF shock in 4/18  Compensated. Continue  Ace, BB. Also on Furosemide    ASHD, hx stents in 2000  VF shock in 4/18   On BB, statin  Will obtain Lexiscan    HTN  Controlled with current therapy    HLD  On statin and Zetia. Lipids and labs followed by PCP.         Continue current care and f/u in 6 months or when testing abnormal    Baron Keith MD

## 2018-06-22 NOTE — PROGRESS NOTES
1. Have you been to the ER, urgent care clinic since your last visit? Hospitalized since your last visit? NO    2. Have you seen or consulted any other health care providers outside of the [address] [place] [address] since your last visit? Include any pap smears or colon screening. NO    ANNUAL. NO CARDIAC C/O.

## 2018-06-22 NOTE — MR AVS SNAPSHOT
[address] [facility] 
[phone] Patient: Arloa Blizzard. MRN:  [id number] Visit Information Date & Time Provider Department Dept. Phone Encounter #  
 6/22/2018  3:30 PM Shailesh Dubois, [address] [facility] [phone] Your Appointments 7/3/2018  9:30 AM  
NUCLEAR MEDICINE with NUCLEAR, [facility]) Appt Note: dr Dipika Serrano 5'8\" 231pds, STRESS TEST LEXISCAN/CARDIOLITE [[id number]] (Order [id number]) ,[address] [facility]  
[phone] [address] [address]  
  
    
 9/18/2018  8:15 AM  
PROCEDURE with PACEMAKER, [facility]) Appt Note: 3 mo bsc bivicd. no landline [address] [address]  
[phone] [address] [address]  
  
    
 9/18/2018  9:00 AM  
ESTABLISHED PATIENT with Sonam Jackson MD  
[facility]) Appt Note: Dr Burton Beth f/u,jar  
 [address] [address]  
[phone] [address] [address] Upcoming Health Maintenance Date Due Hepatitis C Screening 1950 Pneumococcal 65+ Low/Medium Risk (1 of 2 - PCV13) 2/9/2014 GLAUCOMA SCREENING Q2Y 1/15/2017 COLONOSCOPY 5/14/2017 MEDICARE YEARLY EXAM 3/14/2018 Influenza Age 5 to Adult 8/1/2018 DTaP/Tdap/Td series (2 - Td) 7/16/2025 Allergies as of 6/22/2018  Review Complete On: 6/22/2018 By: Leonard Finney LPN No Known Allergies Current Immunizations  Reviewed on 7/16/2015 Name Date Tdap 7/16/2015 Not reviewed this visit You Were Diagnosed With   
  
 Codes Comments  Coronary artery disease involving native coronary artery of native heart without angina pectoris    -  Primary ICD-10-CM: I25.10 ICD-9-CM: 414.01 Cardiomyopathy, unspecified type ([facility]     ICD-10-CM: I42.9 ICD-9-CM: 425.4 Mixed hyperlipidemia     ICD-10-CM: E78.2 ICD-9-CM: 272.2 Essential hypertension, benign     ICD-10-CM: I10 
ICD-9-CM: 401.1 PAF (paroxysmal atrial fibrillation) (HCC)     ICD-10-CM: I48.0 ICD-9-CM: 427.31 Vitals BP Pulse Resp Height(growth percentile) Weight(growth percentile) SpO2  
 110/60 (BP 1 Location: Right arm, BP Patient Position: Sitting) 60 20 5' 8\" (1.727 m) 231 lb 8 oz (105 kg) 94% BMI Smoking Status 35.2 kg/m2 Never Smoker Vitals History BMI and BSA Data Body Mass Index Body Surface Area  
 35.2 kg/m 2 2.24 m 2 Preferred Pharmacy Pharmacy Name Phone [address], [address] [phone] Your Updated Medication List  
  
   
This list is accurate as of 6/22/18  4:29 PM.  Always use your most recent med list.  
  
  
  
  
 apixaban 5 mg tablet Commonly known as:  Theora Boots Take 1 Tab by mouth two (2) times a day. captopril 12.5 mg tablet Commonly known as:  CAPOTEN Take 1 Tab by mouth two (2) times a day. carvedilol 25 mg tablet Commonly known as:  COREG  
TAKE 1 TABLET BY MOUTH TWO (2) TIMES DAILY (WITH MEALS). CENTRUM SILVER Tab tablet Generic drug:  multivitamins-minerals-lutein Take  by mouth. CRESTOR 20 mg tablet Generic drug:  rosuvastatin TAKE 1 TABLET BY MOUTH EVERY DAY  
  
 cyanocobalamin 1,000 mcg tablet Take 1,000 mcg by mouth daily. ezetimibe 10 mg tablet Commonly known as:  Rosaland Chain TAKE 1 TABLET BY MOUTH EVERY DAY  
  
 furosemide 20 mg tablet Commonly known as:  LASIX TAKE 1 TABLET BY MOUTH EVERY DAY  
  
 LEVITRA 20 mg tablet Generic drug:  vardenafil Take 20 mg by mouth as needed. nitroglycerin 400 mcg/spray spray Commonly known as:  Yi Whitehead 1 Spray by SubLINGual route every five (5) minutes as needed. omega-3-dha-epa-dpa-fish oil 1,050-1,200 mg Cap Take 1 Cap by mouth two (2) times a day. OTHER  
  
 promethazine-dm Syrp Take 5 mL by mouth four (4) times daily as needed. tamsulosin 0.4 mg capsule Commonly known as:  FLOMAX TAKE 1 CAPSULE BY MOUTH EVERY DAY  
  
 VITAMIN D3 1,000 unit tablet Generic drug:  cholecalciferol Take  by mouth daily. To-Do List   
 06/22/2018 ECG:  STRESS TEST LEXISCAN/CARDIOLITE Introducing hospitals & HEALTH SERVICES! Richard Ureña introduces [employer] patient portal. Now you can access parts of your medical record, email your doctor's office, and request medication refills online. 1. In your internet browser, go to [email or web address] 2. Click on the First Time User? Click Here link in the Sign In box. You will see the New Member Sign Up page. 3. Enter your [employer] Access Code exactly as it appears below. You will not need to use this code after youve completed the sign-up process. If you do not sign up before the expiration date, you must request a new code. · [employer] Access Code: [email or web address] Expires: 9/17/2018  8:40 AM 
 
4. Enter the last four digits of your Social Security Number (xxxx) and Date of Birth (mm/dd/yyyy) as indicated and click Submit. You will be taken to the next sign-up page. 5. Create a [employer] ID. This will be your [employer] login ID and cannot be changed, so think of one that is secure and easy to remember. 6. Create a [employer] password. You can change your password at any time. 7. Enter your Password Reset Question and Answer. This can be used at a later time if you forget your password. 8. Enter your e-mail address. You will receive e-mail notification when new information is available in [address] 9. Click Sign Up. You can now view and download portions of your medical record. 10. Click the Download Summary menu link to download a portable copy of your medical information. If you have questions, please visit the Frequently Asked Questions section of the [employer] website. Remember, [employer] is NOT to be used for urgent needs. For medical emergencies, dial 911. Now available from your iPhone and Android! Please provide this summary of care documentation to your next provider. Your primary care clinician is listed as Soham Estrada. If you have any questions after today's visit, please call [phone].

## 2018-07-03 ENCOUNTER — CLINICAL SUPPORT (OUTPATIENT)
Dept: CARDIOLOGY CLINIC | Age: 68
End: 2018-07-03

## 2018-07-03 DIAGNOSIS — E78.2 MIXED HYPERLIPIDEMIA: ICD-10-CM

## 2018-07-03 DIAGNOSIS — I10 ESSENTIAL HYPERTENSION, BENIGN: ICD-10-CM

## 2018-07-03 DIAGNOSIS — I48.0 PAF (PAROXYSMAL ATRIAL FIBRILLATION) (HCC): ICD-10-CM

## 2018-07-03 DIAGNOSIS — I42.9 CARDIOMYOPATHY, UNSPECIFIED TYPE (HCC): ICD-10-CM

## 2018-07-03 DIAGNOSIS — I25.10 CORONARY ARTERY DISEASE INVOLVING NATIVE CORONARY ARTERY OF NATIVE HEART WITHOUT ANGINA PECTORIS: ICD-10-CM

## 2018-07-03 DIAGNOSIS — R93.1 ABNORMAL NUCLEAR CARDIAC IMAGING TEST: Primary | ICD-10-CM

## 2018-07-11 RX ORDER — FUROSEMIDE 20 MG/1
20 TABLET ORAL DAILY
Qty: 30 TAB | Refills: 4 | Status: SHIPPED | OUTPATIENT
Start: 2018-07-11 | End: 2018-12-08 | Stop reason: SDUPTHER

## 2018-07-13 NOTE — PROGRESS NOTES
Spoke with patient's spouse(verified HIPPA)  Verified patient with 2 patient identifiers  Informed per Dr Rosa Back stress test abnormal need follow up to discuss. Informed pumping is weaker. Spouse verbalized understanding, awaiting return call to schedule follow up appointment.

## 2018-07-18 ENCOUNTER — OFFICE VISIT (OUTPATIENT)
Dept: CARDIOLOGY CLINIC | Age: 68
End: 2018-07-18

## 2018-07-18 VITALS
HEART RATE: 60 BPM | HEIGHT: 68 IN | DIASTOLIC BLOOD PRESSURE: 80 MMHG | RESPIRATION RATE: 16 BRPM | SYSTOLIC BLOOD PRESSURE: 120 MMHG | BODY MASS INDEX: 34.37 KG/M2 | WEIGHT: 226.8 LBS | OXYGEN SATURATION: 96 %

## 2018-07-18 DIAGNOSIS — I25.10 CORONARY ARTERY DISEASE INVOLVING NATIVE CORONARY ARTERY OF NATIVE HEART WITHOUT ANGINA PECTORIS: ICD-10-CM

## 2018-07-18 DIAGNOSIS — I47.20 VT (VENTRICULAR TACHYCARDIA): ICD-10-CM

## 2018-07-18 DIAGNOSIS — I50.22 SYSTOLIC CHF, CHRONIC (HCC): ICD-10-CM

## 2018-07-18 DIAGNOSIS — I42.0 DILATED CARDIOMYOPATHY (HCC): ICD-10-CM

## 2018-07-18 DIAGNOSIS — I49.01 VF (VENTRICULAR FIBRILLATION) (HCC): Primary | ICD-10-CM

## 2018-07-18 NOTE — PROGRESS NOTES
Nino Lambert MD          NAME:  Lilian Cardozo   :   1950   MRN:   [id number]   PCP:  James Fraser MD           Subjective: The patient is a 70y.o. year old male  who returns for a routine follow-up. Since the last visit, patient reports no change in exercise tolerance, chest pain, edema, medication intolerance, palpitations, shortness of breath, PND/orthopnea wheezing, sputum, syncope, dizziness or light headedness. Doing well. Past Medical History:   Diagnosis Date    Abnormal PSA 2011    CAD (coronary artery disease) 2011    Cardiomyopathy ([facility] 2011    Encounter for long-term (current) use of other medications 2011    Esophageal reflux 2011    Essential hypertension, benign 2011    GERD (gastroesophageal reflux disease) 2012    Heart attack ([facility]     Low back pain 2013    Mixed hyperlipidemia 2011    Nocturia 2011    Pacemaker         ICD-10-CM ICD-9-CM    1. VF (ventricular fibrillation) ([facility]) I49.01 427.41 CBC WITH AUTOMATED DIFF      METABOLIC PANEL, COMPREHENSIVE      PROTHROMBIN TIME + INR   2. Coronary artery disease involving native coronary artery of native heart without angina pectoris I25.10 414.01 CBC WITH AUTOMATED DIFF      METABOLIC PANEL, COMPREHENSIVE      PROTHROMBIN TIME + INR   3. VT (ventricular tachycardia) ([facility]) I47.2 427.1 CBC WITH AUTOMATED DIFF      METABOLIC PANEL, COMPREHENSIVE      PROTHROMBIN TIME + INR   4. Dilated cardiomyopathy ([facility]) I42.0 425.4 CBC WITH AUTOMATED DIFF      METABOLIC PANEL, COMPREHENSIVE      PROTHROMBIN TIME + INR   5.  Systolic CHF, chronic ([facility]) I50.22 428.22 CBC WITH AUTOMATED DIFF     597.6 METABOLIC PANEL, COMPREHENSIVE      PROTHROMBIN TIME + INR      Social History   Substance Use Topics    Smoking status: Never Smoker    Smokeless tobacco: Never Used    Alcohol use No      Family History   Problem Relation Age of Onset    Lung Disease Mother     Alcohol abuse Father Review of Systems  Cardiovascular: Negative except as noted in HPI      Objective:       Vitals:    07/18/18 1138 07/18/18 1139   BP: 110/80 120/80   Pulse: 60    Resp: 16    SpO2: 96%    Weight: 226 lb 12.8 oz (102.9 kg)    Height: 5' 8\" (1.727 m)     Body mass index is 34.48 kg/(m^2). General PE  Mental Status - Alert. General Appearance - Not in acute distress. Chest and Lung Exam   Inspection: Accessory muscles - No use of accessory muscles in breathing. Auscultation:   Breath sounds: - Normal.    Cardiovascular   Inspection: Jugular vein - Bilateral - Inspection Normal.  Palpation/Percussion:   Apical Impulse: - Normal.  Auscultation: Rhythm - Regular. Heart Sounds - S1 WNL and S2 WNL. No S3 or S4. Murmurs & Other Heart Sounds: Auscultation of the heart reveals - No Murmurs. Peripheral Vascular   Upper Extremity: Inspection - Bilateral - No Cyanotic nailbeds or Digital clubbing. Lower Extremity:   Palpation: Edema - Bilateral - No edema. Data Review:     EKG -  EKG: . LABS- @brieflabs@      Allergies reviewed  No Known Allergies    Medications reviewed  Current Outpatient Prescriptions   Medication Sig    carvedilol (COREG) 25 mg tablet Take 1 Tab by mouth two (2) times daily (with meals).  apixaban (ELIQUIS) 5 mg tablet Take 1 Tab by mouth two (2) times a day.  furosemide (LASIX) 20 mg tablet Take 1 Tab by mouth daily.  tamsulosin (FLOMAX) 0.4 mg capsule TAKE 1 CAPSULE BY MOUTH EVERY DAY    OTHER     ezetimibe (ZETIA) 10 mg tablet TAKE 1 TABLET BY MOUTH EVERY DAY    LEVITRA 20 mg tablet Take 20 mg by mouth as needed.  captopril (CAPOTEN) 12.5 mg tablet Take 1 Tab by mouth two (2) times a day.  CRESTOR 20 mg tablet TAKE 1 TABLET BY MOUTH EVERY DAY    nitroglycerin (NITROLINGUAL) 400 mcg/spray spray 1 Spray by SubLINGual route every five (5) minutes as needed.  cyanocobalamin 1,000 mcg tablet Take 1,000 mcg by mouth daily.     omega-3-dha-epa-dpa-fish oil 1,050-1,200 mg cap Take 1 Cap by mouth two (2) times a day.  D-METHORPHAN HB/PROMETH HCL (PROMETHAZINE-DM) Syrp Take 5 mL by mouth four (4) times daily as needed.  cholecalciferol, vitamin d3, (VITAMIN D) 1,000 unit tablet Take  by mouth daily.  multivitamins-minerals-lutein (CENTRUM SILVER) Tab Take  by mouth. No current facility-administered medications for this visit. Assessment:       ICD-10-CM ICD-9-CM    1. VF (ventricular fibrillation) ([facility]) I49.01 427.41 CBC WITH AUTOMATED DIFF      METABOLIC PANEL, COMPREHENSIVE      PROTHROMBIN TIME + INR   2. Coronary artery disease involving native coronary artery of native heart without angina pectoris I25.10 414.01 CBC WITH AUTOMATED DIFF      METABOLIC PANEL, COMPREHENSIVE      PROTHROMBIN TIME + INR   3. VT (ventricular tachycardia) ([facility]) I47.2 427.1 CBC WITH AUTOMATED DIFF      METABOLIC PANEL, COMPREHENSIVE      PROTHROMBIN TIME + INR   4. Dilated cardiomyopathy ([facility]) I42.0 425.4 CBC WITH AUTOMATED DIFF      METABOLIC PANEL, COMPREHENSIVE      PROTHROMBIN TIME + INR   5. Systolic CHF, chronic ([facility]) I50.22 428.22 CBC WITH AUTOMATED DIFF     927.4 METABOLIC PANEL, COMPREHENSIVE      PROTHROMBIN TIME + INR        Orders Placed This Encounter    CBC WITH AUTOMATED DIFF    METABOLIC PANEL, COMPREHENSIVE    PROTHROMBIN TIME + INR       Plan:     Inconclusive Myoview for ischemia with large areas of infarct. EF down. No cath for 18 yr. Ischemic CM with recent VF episode shocked by ICD. Plan cath to determine if there is an ischemic basis for this.     Li Cartwright MD

## 2018-07-18 NOTE — PROGRESS NOTES
1. Have you been to the ER, urgent care clinic since your last visit? Hospitalized since your last visit? No    2. Have you seen or consulted any other health care providers outside of the [address] [place] [address] since your last visit? Include any pap smears or colon screening. No     Patient her for review of recent testing . Had noted dizziness with Coreg increase has been eating prior to this with improvement. Celi Otto

## 2018-07-26 ENCOUNTER — HOSPITAL ENCOUNTER (OUTPATIENT)
Dept: LAB | Age: 68
Discharge: HOME | End: 2018-07-26
Payer: MEDICARE

## 2018-07-26 PROCEDURE — 80053 COMPREHEN METABOLIC PANEL: CPT

## 2018-07-26 PROCEDURE — 85610 PROTHROMBIN TIME: CPT

## 2018-07-26 PROCEDURE — 36415 COLL VENOUS BLD VENIPUNCTURE: CPT

## 2018-07-26 PROCEDURE — 85025 COMPLETE CBC W/AUTO DIFF WBC: CPT

## 2018-07-27 LAB
ALBUMIN SERPL-MCNC: 4.4 G/DL (ref 3.6–4.8)
ALBUMIN/GLOB SERPL: 2.4 {RATIO} (ref 1.2–2.2)
ALP SERPL-CCNC: 40 IU/L (ref 39–117)
ALT SERPL-CCNC: 31 IU/L (ref 0–44)
AST SERPL-CCNC: 28 IU/L (ref 0–40)
BASOPHILS # BLD AUTO: 0 X10E3/UL (ref 0–0.2)
BASOPHILS NFR BLD AUTO: 0 %
BILIRUB SERPL-MCNC: 1.2 MG/DL (ref 0–1.2)
BUN SERPL-MCNC: 12 MG/DL (ref 8–27)
BUN/CREAT SERPL: 8 (ref 10–24)
CALCIUM SERPL-MCNC: 9.4 MG/DL (ref 8.6–10.2)
CHLORIDE SERPL-SCNC: 106 MMOL/L (ref 96–106)
CO2 SERPL-SCNC: 22 MMOL/L (ref 20–29)
CREAT SERPL-MCNC: 1.42 MG/DL (ref 0.76–1.27)
EOSINOPHIL # BLD AUTO: 0.1 X10E3/UL (ref 0–0.4)
EOSINOPHIL NFR BLD AUTO: 2 %
ERYTHROCYTE [DISTWIDTH] IN BLOOD BY AUTOMATED COUNT: 13.6 % (ref 12.3–15.4)
GLOBULIN SER CALC-MCNC: 1.8 G/DL (ref 1.5–4.5)
GLUCOSE SERPL-MCNC: 95 MG/DL (ref 65–99)
HCT VFR BLD AUTO: 42.3 % (ref 37.5–51)
HGB BLD-MCNC: 14.3 G/DL (ref 13–17.7)
IMM GRANULOCYTES # BLD: 0 X10E3/UL (ref 0–0.1)
IMM GRANULOCYTES NFR BLD: 0 %
INR PPP: 1.1 (ref 0.8–1.2)
INTERPRETATION: NORMAL
LYMPHOCYTES # BLD AUTO: 1.3 X10E3/UL (ref 0.7–3.1)
LYMPHOCYTES NFR BLD AUTO: 25 %
MCH RBC QN AUTO: 32.1 PG (ref 26.6–33)
MCHC RBC AUTO-ENTMCNC: 33.8 G/DL (ref 31.5–35.7)
MCV RBC AUTO: 95 FL (ref 79–97)
MONOCYTES # BLD AUTO: 0.5 X10E3/UL (ref 0.1–0.9)
MONOCYTES NFR BLD AUTO: 9 %
NEUTROPHILS # BLD AUTO: 3.5 X10E3/UL (ref 1.4–7)
NEUTROPHILS NFR BLD AUTO: 64 %
PLATELET # BLD AUTO: 133 X10E3/UL (ref 150–379)
POTASSIUM SERPL-SCNC: 4.1 MMOL/L (ref 3.5–5.2)
PROT SERPL-MCNC: 6.2 G/DL (ref 6–8.5)
PROTHROMBIN TIME: 11.2 SEC (ref 9.1–12)
RBC # BLD AUTO: 4.45 X10E6/UL (ref 4.14–5.8)
SODIUM SERPL-SCNC: 142 MMOL/L (ref 134–144)
WBC # BLD AUTO: 5.4 X10E3/UL (ref 3.4–10.8)

## 2018-08-09 ENCOUNTER — HOSPITAL ENCOUNTER (OUTPATIENT)
Dept: CARDIAC CATH/INVASIVE PROCEDURES | Age: 68
Discharge: HOME | End: 2018-08-10
Attending: INTERNAL MEDICINE | Admitting: INTERNAL MEDICINE
Payer: MEDICARE

## 2018-08-09 PROCEDURE — 74011250636 HC RX REV CODE- 250/636

## 2018-08-09 PROCEDURE — C1769 GUIDE WIRE: HCPCS

## 2018-08-09 PROCEDURE — 74011000250 HC RX REV CODE- 250

## 2018-08-09 PROCEDURE — 74011250636 HC RX REV CODE- 250/636: Performed by: INTERNAL MEDICINE

## 2018-08-09 PROCEDURE — C1887 CATHETER, GUIDING: HCPCS

## 2018-08-09 PROCEDURE — 77030019697 HC SYR ANGI INFL MRTM -B

## 2018-08-09 PROCEDURE — C1725 CATH, TRANSLUMIN NON-LASER: HCPCS

## 2018-08-09 PROCEDURE — 77030015766

## 2018-08-09 PROCEDURE — 77030019569 HC BND COMPR RAD TERU -B

## 2018-08-09 PROCEDURE — 93458 L HRT ARTERY/VENTRICLE ANGIO: CPT

## 2018-08-09 PROCEDURE — C1874 STENT, COATED/COV W/DEL SYS: HCPCS

## 2018-08-09 PROCEDURE — 77030019698 HC SYR ANGI MDLON MRTM -A

## 2018-08-09 PROCEDURE — 77030008543 HC TBNG MON PRSS MRTM -A

## 2018-08-09 PROCEDURE — 74011250636 HC RX REV CODE- 250/636: Performed by: NURSE PRACTITIONER

## 2018-08-09 PROCEDURE — 74011000258 HC RX REV CODE- 258: Performed by: INTERNAL MEDICINE

## 2018-08-09 PROCEDURE — 74011636320 HC RX REV CODE- 636/320

## 2018-08-09 PROCEDURE — 74011250637 HC RX REV CODE- 250/637: Performed by: INTERNAL MEDICINE

## 2018-08-09 PROCEDURE — 77030010221 HC SPLNT WR POS TELE -B

## 2018-08-09 PROCEDURE — 77030012468 HC VLV BLEEDBK CNTRL ABBT -B

## 2018-08-09 PROCEDURE — 77030004549 HC CATH ANGI DX PRF MRTM -A

## 2018-08-09 PROCEDURE — 77030028837 HC SYR ANGI PWR INJ COEU -A

## 2018-08-09 PROCEDURE — C1894 INTRO/SHEATH, NON-LASER: HCPCS

## 2018-08-09 PROCEDURE — 74011250637 HC RX REV CODE- 250/637

## 2018-08-09 PROCEDURE — 93005 ELECTROCARDIOGRAM TRACING: CPT

## 2018-08-09 PROCEDURE — 74011250637 HC RX REV CODE- 250/637: Performed by: NURSE PRACTITIONER

## 2018-08-09 RX ORDER — LIDOCAINE HYDROCHLORIDE 10 MG/ML
1-30 INJECTION, SOLUTION EPIDURAL; INFILTRATION; INTRACAUDAL; PERINEURAL
Status: DISCONTINUED | OUTPATIENT
Start: 2018-08-09 | End: 2018-08-09

## 2018-08-09 RX ORDER — SODIUM CHLORIDE 0.9 % (FLUSH) 0.9 %
5-10 SYRINGE (ML) INJECTION AS NEEDED
Status: DISCONTINUED | OUTPATIENT
Start: 2018-08-09 | End: 2018-08-10 | Stop reason: HOSPADM

## 2018-08-09 RX ORDER — SODIUM CHLORIDE 9 MG/ML
50 INJECTION, SOLUTION INTRAVENOUS CONTINUOUS
Status: DISCONTINUED | OUTPATIENT
Start: 2018-08-09 | End: 2018-08-10 | Stop reason: HOSPADM

## 2018-08-09 RX ORDER — LIDOCAINE HYDROCHLORIDE 10 MG/ML
INJECTION, SOLUTION EPIDURAL; INFILTRATION; INTRACAUDAL; PERINEURAL
Status: COMPLETED
Start: 2018-08-09 | End: 2018-08-09

## 2018-08-09 RX ORDER — CAPTOPRIL 12.5 MG/1
12.5 TABLET ORAL 2 TIMES DAILY
Status: DISCONTINUED | OUTPATIENT
Start: 2018-08-09 | End: 2018-08-10 | Stop reason: HOSPADM

## 2018-08-09 RX ORDER — ATORVASTATIN CALCIUM 40 MG/1
40 TABLET, FILM COATED ORAL
Status: DISCONTINUED | OUTPATIENT
Start: 2018-08-09 | End: 2018-08-10 | Stop reason: HOSPADM

## 2018-08-09 RX ORDER — MIDAZOLAM HYDROCHLORIDE 1 MG/ML
INJECTION, SOLUTION INTRAMUSCULAR; INTRAVENOUS
Status: COMPLETED
Start: 2018-08-09 | End: 2018-08-09

## 2018-08-09 RX ORDER — HEPARIN SODIUM 200 [USP'U]/100ML
500 INJECTION, SOLUTION INTRAVENOUS ONCE
Status: COMPLETED | OUTPATIENT
Start: 2018-08-09 | End: 2018-08-09

## 2018-08-09 RX ORDER — ADENOSINE 3 MG/ML
INJECTION, SOLUTION INTRAVENOUS
Status: DISCONTINUED
Start: 2018-08-09 | End: 2018-08-09

## 2018-08-09 RX ORDER — SODIUM CHLORIDE 0.9 % (FLUSH) 0.9 %
5-10 SYRINGE (ML) INJECTION EVERY 8 HOURS
Status: DISCONTINUED | OUTPATIENT
Start: 2018-08-09 | End: 2018-08-10 | Stop reason: HOSPADM

## 2018-08-09 RX ORDER — BIVALIRUDIN 250 MG/5ML
INJECTION, POWDER, LYOPHILIZED, FOR SOLUTION INTRAVENOUS
Status: DISCONTINUED
Start: 2018-08-09 | End: 2018-08-09

## 2018-08-09 RX ORDER — VERAPAMIL HYDROCHLORIDE 2.5 MG/ML
INJECTION, SOLUTION INTRAVENOUS
Status: COMPLETED
Start: 2018-08-09 | End: 2018-08-09

## 2018-08-09 RX ORDER — EZETIMIBE 10 MG/1
10 TABLET ORAL DAILY
Status: DISCONTINUED | OUTPATIENT
Start: 2018-08-10 | End: 2018-08-10 | Stop reason: HOSPADM

## 2018-08-09 RX ORDER — FENTANYL CITRATE 50 UG/ML
25-50 INJECTION, SOLUTION INTRAMUSCULAR; INTRAVENOUS
Status: DISCONTINUED | OUTPATIENT
Start: 2018-08-09 | End: 2018-08-09

## 2018-08-09 RX ORDER — HEPARIN SODIUM 1000 [USP'U]/ML
2500 INJECTION, SOLUTION INTRAVENOUS; SUBCUTANEOUS ONCE
Status: COMPLETED | OUTPATIENT
Start: 2018-08-09 | End: 2018-08-09

## 2018-08-09 RX ORDER — FENTANYL CITRATE 50 UG/ML
INJECTION, SOLUTION INTRAMUSCULAR; INTRAVENOUS
Status: COMPLETED
Start: 2018-08-09 | End: 2018-08-09

## 2018-08-09 RX ORDER — ACETAMINOPHEN 325 MG/1
650 TABLET ORAL
Status: DISCONTINUED | OUTPATIENT
Start: 2018-08-09 | End: 2018-08-10 | Stop reason: HOSPADM

## 2018-08-09 RX ORDER — CLOPIDOGREL 300 MG/1
TABLET, FILM COATED ORAL
Status: COMPLETED
Start: 2018-08-09 | End: 2018-08-09

## 2018-08-09 RX ORDER — HEPARIN SODIUM 1000 [USP'U]/ML
INJECTION, SOLUTION INTRAVENOUS; SUBCUTANEOUS
Status: COMPLETED
Start: 2018-08-09 | End: 2018-08-09

## 2018-08-09 RX ORDER — HEPARIN SODIUM 200 [USP'U]/100ML
INJECTION, SOLUTION INTRAVENOUS
Status: COMPLETED
Start: 2018-08-09 | End: 2018-08-09

## 2018-08-09 RX ORDER — GUAIFENESIN 100 MG/5ML
324 LIQUID (ML) ORAL ONCE
Status: COMPLETED | OUTPATIENT
Start: 2018-08-09 | End: 2018-08-09

## 2018-08-09 RX ORDER — GUAIFENESIN 100 MG/5ML
LIQUID (ML) ORAL
Status: DISCONTINUED
Start: 2018-08-09 | End: 2018-08-09

## 2018-08-09 RX ORDER — TAMSULOSIN HYDROCHLORIDE 0.4 MG/1
0.4 CAPSULE ORAL DAILY
Status: DISCONTINUED | OUTPATIENT
Start: 2018-08-10 | End: 2018-08-10 | Stop reason: HOSPADM

## 2018-08-09 RX ORDER — CLOPIDOGREL 300 MG/1
600 TABLET, FILM COATED ORAL ONCE
Status: COMPLETED | OUTPATIENT
Start: 2018-08-09 | End: 2018-08-09

## 2018-08-09 RX ORDER — FUROSEMIDE 20 MG/1
20 TABLET ORAL DAILY
Status: DISCONTINUED | OUTPATIENT
Start: 2018-08-10 | End: 2018-08-10 | Stop reason: HOSPADM

## 2018-08-09 RX ORDER — VERAPAMIL HYDROCHLORIDE 2.5 MG/ML
2.5 INJECTION, SOLUTION INTRAVENOUS ONCE
Status: COMPLETED | OUTPATIENT
Start: 2018-08-09 | End: 2018-08-09

## 2018-08-09 RX ORDER — GUAIFENESIN AND DEXTROMETHORPHAN HYDROBROMIDE 1200; 60 MG/1; MG/1
TABLET, EXTENDED RELEASE ORAL 2 TIMES DAILY
COMMUNITY
End: 2019-09-18

## 2018-08-09 RX ORDER — SODIUM CHLORIDE 900 MG/100ML
INJECTION INTRAVENOUS
Status: DISCONTINUED
Start: 2018-08-09 | End: 2018-08-09

## 2018-08-09 RX ORDER — CLOPIDOGREL BISULFATE 75 MG/1
75 TABLET ORAL DAILY
Status: DISCONTINUED | OUTPATIENT
Start: 2018-08-10 | End: 2018-08-10 | Stop reason: HOSPADM

## 2018-08-09 RX ORDER — CARVEDILOL 12.5 MG/1
25 TABLET ORAL 2 TIMES DAILY WITH MEALS
Status: DISCONTINUED | OUTPATIENT
Start: 2018-08-09 | End: 2018-08-10 | Stop reason: HOSPADM

## 2018-08-09 RX ORDER — MIDAZOLAM HYDROCHLORIDE 1 MG/ML
.5-2 INJECTION, SOLUTION INTRAMUSCULAR; INTRAVENOUS
Status: DISCONTINUED | OUTPATIENT
Start: 2018-08-09 | End: 2018-08-09

## 2018-08-09 RX ADMIN — IOPAMIDOL 120 ML: 755 INJECTION, SOLUTION INTRAVENOUS at 09:50

## 2018-08-09 RX ADMIN — VERAPAMIL HYDROCHLORIDE 2.5 MG: 2.5 INJECTION INTRAVENOUS at 08:45

## 2018-08-09 RX ADMIN — NITROGLYCERIN 200 MCG: 5 INJECTION, SOLUTION INTRAVENOUS at 08:45

## 2018-08-09 RX ADMIN — MIDAZOLAM HYDROCHLORIDE 1 MG: 1 INJECTION, SOLUTION INTRAMUSCULAR; INTRAVENOUS at 09:41

## 2018-08-09 RX ADMIN — FENTANYL CITRATE 25 MCG: 50 INJECTION, SOLUTION INTRAMUSCULAR; INTRAVENOUS at 08:20

## 2018-08-09 RX ADMIN — VERAPAMIL HYDROCHLORIDE 2.5 MG: 2.5 INJECTION, SOLUTION INTRAVENOUS at 08:45

## 2018-08-09 RX ADMIN — MIDAZOLAM HYDROCHLORIDE 2 MG: 1 INJECTION, SOLUTION INTRAMUSCULAR; INTRAVENOUS at 09:02

## 2018-08-09 RX ADMIN — HEPARIN SODIUM IN SODIUM CHLORIDE 1000 UNITS: 200 INJECTION INTRAVENOUS at 08:11

## 2018-08-09 RX ADMIN — IOPAMIDOL 80 ML: 755 INJECTION, SOLUTION INTRAVENOUS at 10:35

## 2018-08-09 RX ADMIN — MIDAZOLAM HYDROCHLORIDE 1 MG: 1 INJECTION, SOLUTION INTRAMUSCULAR; INTRAVENOUS at 08:42

## 2018-08-09 RX ADMIN — ASPIRIN 81 MG 324 MG: 81 TABLET ORAL at 08:37

## 2018-08-09 RX ADMIN — FENTANYL CITRATE 50 MCG: 50 INJECTION, SOLUTION INTRAMUSCULAR; INTRAVENOUS at 08:42

## 2018-08-09 RX ADMIN — HEPARIN SODIUM 2500 UNITS: 1000 INJECTION INTRAVENOUS; SUBCUTANEOUS at 08:45

## 2018-08-09 RX ADMIN — Medication 10 ML: at 21:00

## 2018-08-09 RX ADMIN — MIDAZOLAM HYDROCHLORIDE 2 MG: 1 INJECTION, SOLUTION INTRAMUSCULAR; INTRAVENOUS at 08:20

## 2018-08-09 RX ADMIN — ATORVASTATIN CALCIUM 40 MG: 40 TABLET, FILM COATED ORAL at 21:00

## 2018-08-09 RX ADMIN — HEPARIN SODIUM 1000 UNITS: 200 INJECTION, SOLUTION INTRAVENOUS at 08:11

## 2018-08-09 RX ADMIN — SODIUM CHLORIDE 50 ML/HR: 900 INJECTION, SOLUTION INTRAVENOUS at 14:15

## 2018-08-09 RX ADMIN — CLOPIDOGREL 600 MG: 300 TABLET, FILM COATED ORAL at 10:32

## 2018-08-09 RX ADMIN — CLOPIDOGREL BISULFATE 600 MG: 300 TABLET, FILM COATED ORAL at 10:32

## 2018-08-09 RX ADMIN — ADENOSINE 140 MCG/KG/MIN: 3 INJECTION, SOLUTION INTRAVENOUS at 09:03

## 2018-08-09 RX ADMIN — BIVALIRUDIN 1.75 MG/KG/HR: 250 INJECTION, POWDER, LYOPHILIZED, FOR SOLUTION INTRAVENOUS at 09:29

## 2018-08-09 RX ADMIN — HEPARIN SODIUM IN SODIUM CHLORIDE 1000 UNITS: 200 INJECTION INTRAVENOUS at 08:10

## 2018-08-09 RX ADMIN — CARVEDILOL 25 MG: 12.5 TABLET, FILM COATED ORAL at 17:56

## 2018-08-09 RX ADMIN — IOPAMIDOL 20 ML: 755 INJECTION, SOLUTION INTRAVENOUS at 09:09

## 2018-08-09 RX ADMIN — CAPTOPRIL 12.5 MG: 12.5 TABLET ORAL at 17:57

## 2018-08-09 RX ADMIN — FENTANYL CITRATE 25 MCG: 50 INJECTION, SOLUTION INTRAMUSCULAR; INTRAVENOUS at 09:41

## 2018-08-09 RX ADMIN — FENTANYL CITRATE 25 MCG: 50 INJECTION, SOLUTION INTRAMUSCULAR; INTRAVENOUS at 09:02

## 2018-08-09 RX ADMIN — LIDOCAINE HYDROCHLORIDE 1 ML: 10 INJECTION, SOLUTION EPIDURAL; INFILTRATION; INTRACAUDAL; PERINEURAL at 08:42

## 2018-08-09 RX ADMIN — HEPARIN SODIUM 2500 UNITS: 1000 INJECTION, SOLUTION INTRAVENOUS; SUBCUTANEOUS at 08:45

## 2018-08-09 RX ADMIN — BIVALIRUDIN 1.75 MG/KG/HR: 250 INJECTION, POWDER, LYOPHILIZED, FOR SOLUTION INTRAVENOUS at 08:56

## 2018-08-09 RX ADMIN — MIDAZOLAM 2 MG: 1 INJECTION INTRAMUSCULAR; INTRAVENOUS at 08:20

## 2018-08-09 RX ADMIN — SODIUM CHLORIDE 250 ML: 900 INJECTION, SOLUTION INTRAVENOUS at 08:36

## 2018-08-09 NOTE — INTERVAL H&P NOTE
H&P Update:  Alphonse Wheeler was seen and examined. History and physical has been reviewed. The patient has been examined.  There have been no significant clinical changes since the completion of the originally dated History and Physical.    Signed By: Shira Burger MD     August 9, 2018 8:32 AM

## 2018-08-09 NOTE — IP AVS SNAPSHOT
[address] [facility] 
[phone] Patient: Dola Brunner MRN: [id number] NMS:[id number] About your hospitalization You were admitted on:  August 9, 2018 You last received care in the:  [facility] 2 INTRVNTNL CARDIO You were discharged on:  August 10, 2018 Why you were hospitalized Your primary diagnosis was:  Not on File Your diagnoses also included:  A-Fib (Hcc), Presence Of Biventricular Aicd, Abnormal Stress Ecg, Vt (Ventricular Tachycardia) (Hcc), Cardiomyopathy (Hcc), Essential Hypertension, Benign, Chronic Diastolic Hf (Heart Failure) ([facility]), S/P Coronary Artery Stent Placement, Vf (Ventricular Fibrillation) ([facility]) Follow-up Information Follow up With Details Comments Contact Vu Infante MD Schedule an appointment as soon as possible for a visit in 1 week  [address] [place] [address] 
[phone] Korina Guerrero MD Go on 8/15/2018 For scheduled appointment at 2:15PM  [address] [place] [address] 
[phone] [facility] Go on 8/29/2018 cardiac rehab oreintation at 9:00 am [address] [facility] [address]   [address] [address] [phone] Your Scheduled Appointments Wednesday August 15, 2018  2:15 PM EDT Complete Physical with Korina Guerrero MD  
[address]) [address] [facility] 7 [id number] [phone] Wednesday August 29, 2018  9:00 AM EDT  
CR INTAKE with [facility]  
[facility] CARDIOPULM [facility] ([address]) [address] [facility]  
[phone] Tuesday September 18, 2018  8:15 AM EDT PROCEDURE with PACEMAKER, [facility] [address] [facility]) [address]  
[phone] Tuesday September 18, 2018  9:00 AM EDT  
ESTABLISHED PATIENT with Brittni Estrada MD  
[facility] [address] [facility]) [address] [facility]  
[phone] Discharge Orders None A check shannan indicates which time of day the medication should be taken. My Medications START taking these medications Instructions Each Dose to Equal  
 Morning Noon Evening Bedtime  
 clopidogrel 75 mg Tab Commonly known as:  PLAVIX Start taking on:  8/11/2018 Your last dose was: Your next dose is: Take 1 Tab by mouth daily. 75 mg CONTINUE taking these medications Instructions Each Dose to Equal  
 Morning Noon Evening Bedtime  
 apixaban 5 mg tablet Commonly known as:  Lyndsay Shield Your last dose was: Your next dose is: Take 1 Tab by mouth two (2) times a day. 5 mg  
    
   
   
   
  
 captopril 12.5 mg tablet Commonly known as:  CAPOTEN Your last dose was: Your next dose is: Take 1 Tab by mouth two (2) times a day. 12.5 mg  
    
   
   
   
  
 carvedilol 25 mg tablet Commonly known as:  Rafa Ramos Your last dose was: Your next dose is: Take 1 Tab by mouth two (2) times daily (with meals). 25 mg CENTRUM SILVER Tab tablet Generic drug:  multivitamins-minerals-lutein Your last dose was: Your next dose is: Take  by mouth. CRESTOR 20 mg tablet Generic drug:  rosuvastatin Your last dose was: Your next dose is: TAKE 1 TABLET BY MOUTH EVERY DAY  
     
   
   
   
  
 cyanocobalamin 1,000 mcg tablet Your last dose was: Your next dose is: Take 1,000 mcg by mouth daily. 1000 mcg  
    
   
   
   
  
 ezetimibe 10 mg tablet Commonly known as:  Alonso Baker Your last dose was: Your next dose is: TAKE 1 TABLET BY MOUTH EVERY DAY  
     
   
   
   
  
 furosemide 20 mg tablet Commonly known as:  LASIX Your last dose was: Your next dose is: Take 1 Tab by mouth daily. 20 mg  
    
   
   
   
  
 LEVITRA 20 mg tablet Generic drug:  vardenafil Your last dose was: Your next dose is: Take 20 mg by mouth as needed. 20 mg  
    
   
   
   
  
 MUCINEX DM 60-1,200 mg Tb12 Generic drug:  dextromethorphan-guaiFENesin Your last dose was: Your next dose is: Take  by mouth two (2) times a day. nitroglycerin 400 mcg/spray spray Commonly known as:  Norval B[place]ide Your last dose was: Your next dose is:    
   
   
 1 Spray by SubLINGual route every five (5) minutes as needed. 1 Spray  
    
   
   
   
  
 omega-3-dha-epa-dpa-fish oil 1,050-1,200 mg Cap Your last dose was: Your next dose is: Take 1 Cap by mouth two (2) times a day. 1 Cap OTHER Your last dose was: Your next dose is:    
   
   
      
   
   
   
  
 tamsulosin 0.4 mg capsule Commonly known as:  FLOMAX Your last dose was: Your next dose is: TAKE 1 CAPSULE BY MOUTH EVERY DAY  
     
   
   
   
  
 VITAMIN D3 1,000 unit tablet Generic drug:  cholecalciferol Your last dose was: Your next dose is: Take  by mouth daily. Where to Get Your Medications These medications were sent to [address], [address], [address] [address] Phone:  [phone]  
  clopidogrel 75 mg Tab Discharge Instructions [address] [address]  [phone] Patient ID: Jose Matias 
[id number] 
70 y.o. 1950 Admit Date: 8/9/2018 Discharge Date: 8/10/2018 Admitting Physician: Hank Hopson MD  
 
Discharge Physician: Lizzie Abreu NP Admission Diagnoses:  
cath Discharge Diagnoses: Active Problems: 
  Cardiomyopathy ([address] (2/16/2011) Essential hypertension, benign (2/16/2011) A-fib ([address] (8/10/2018) Presence of biventricular AICD (8/10/2018) Overview: [employer] implanted 5/2017 Abnormal stress ECG (8/10/2018) VT (ventricular tachycardia) ([address] (8/10/2018) Chronic diastolic HF (heart failure) ([address] (8/10/2018) S/P coronary artery stent placement (8/10/2018) Overview: 8/9/18 PCI/SUREKHA to LAD, RCA x 2 
 
  VF (ventricular fibrillation) ([address] (8/10/2018) Discharge Condition: Good Cardiology Procedures this Admission:  Left heart catheterization with PCI Disposition: home Reference discharge instructions provided by nursing for diet and activity. Signed: 
Lizzie Abreu NP 
8/10/2018 
8:05 AM 
 
 
Radial Cardiac Catheterization/Angiography Discharge Instructions It is normal to feel tired the first couple days. Take it easy and follow the physicians instructions. CHECK THE CATHETER INSERTION SITE DAILY: 
 
Remove the wrist dressing 24 hours after the procedure. You may shower 24 hours after the procedure. Wash with soap and water and pat dry. Gentle cleaning of the site with soap and water is sufficient, cover with a dry clean dressing or bandage. Do not apply creams or powders to the area. No soaking the wrist for 3 days. Leave the puncture site open to air after 24 hours post-procedure. CALL THE PHYSICIANS:  
 
If the site becomes red, swollen or feels warm to the touch If there is bleeding or drainage or if there is unusual pain at the radial site. If there is any minor oozing, you may apply a band-aid and remove after 12 hours. If the bleeding continues, hold pressure with the middle finger against the puncture site and the thumb against the back of the wrist,call 911 to be transported to the hospital. 
DO NOT DRIVE YOURSELF, [address] ACTIVITY:  
For the first 24 hours do not manipulate the wrist. 
No lifting, pushing or pulling over 3-5 pounds with the affected wrist for 7 daysand no straining the insertion site. Do not life grocery bags or the garbage can, do not run the vacuum  or  for 7 days. Start with short walks as in the hospital and gradually increase as tolerated each day. It is recommended to walk 30 minutes 5-7 days per week. Follow your physicians instructions on activity. Avoid walking outside in extremes of heat or cold. Walk inside when it is cold and windy or hot and humid. Things to keep in mind: 
No driving for at least 24 hours, or as designated by your physician. Limit the number of times you go up and down the stairs Take rests and pace yourself with activity. Be careful and do not strain with bowel movements. MEDICATIONS: 
 
Take all medications as prescribed Call your physician if you have any questions Keep an updated list of your medications with you at all times and give a list to your physician and pharmacist 
 
SIGNS AND SYMPTOMS:  
Be cautious of symptoms of angina or recurrent symptoms such as chest discomfort, unusual shortness of breath or fatigue. These could be symptoms of restenosis, a new blockage or a heart attack. If your symptoms are relieved with rest it is still recommended that you notify your physician of recurrent chest pain or discomfort. For CHEST PAIN or symptoms of angina not relieved with rest:  If the discomfort is not relieved with rest, and you have been prescribed Nitroglycerin, take as directed (taken under the tongue, one at a time 5 minutes apart for a total of 3 doses).  If the discomfort is not relieved after the 3rd nitroglycerin, call 911. If you have not been prescribed Nitroglycerin  and your chest discomfort is not relieved with rest, call 911. AFTER CARE:  
Follow up with your physician as instructed. Follow a heart healthy diet with proper portion control, daily stress management, daily exercise, blood pressure and cholesterol control , and smoking cessation. Introducing [facility]! Karlo Owen introduces ComplyMD patient portal. Now you can access parts of your medical record, email your doctor's office, and request medication refills online. 1. In your internet browser, go to [email or web address] 2. Click on the First Time User? Click Here link in the Sign In box. You will see the New Member Sign Up page. 3. Enter your ComplyMD Access Code exactly as it appears below. You will not need to use this code after youve completed the sign-up process. If you do not sign up before the expiration date, you must request a new code. · ComplyMD Access Code: [email or web address] Expires: 9/17/2018  8:40 AM 
 
4. Enter the last four digits of your Social Security Number (xxxx) and Date of Birth (mm/dd/yyyy) as indicated and click Submit. You will be taken to the next sign-up page. 5. Create a ComplyMD ID. This will be your ComplyMD login ID and cannot be changed, so think of one that is secure and easy to remember. 6. Create a ComplyMD password. You can change your password at any time. 7. Enter your Password Reset Question and Answer. This can be used at a later time if you forget your password. 8. Enter your e-mail address. You will receive e-mail notification when new information is available in [address] 9. Click Sign Up. You can now view and download portions of your medical record. 10. Click the Download Summary menu link to download a portable copy of your medical information.  
 
If you have questions, please visit the Frequently Asked Questions section of the Wingu. Remember, MyChart is NOT to be used for urgent needs. For medical emergencies, dial 911. Now available from your iPhone and Android! Introducing Sb Stanton As a [facility] patient, I wanted to make you aware of our electronic visit tool called Sb Stanton. "[employer] 24/7 allows you to connect within minutes with a medical provider 24 hours a day, seven days a week via a mobile device or tablet or logging into a secure website from your computer. You can access Sb Stanton from anywhere in the [place]. A virtual visit might be right for you when you have a simple condition and feel like you just dont want to get out of bed, or cant get away from work for an appointment, when your regular [facility] provider is not available (evenings, weekends or holidays), or when youre out of town and need minor care. Electronic visits cost only $49 and if the [facility] 24/7 provider determines a prescription is needed to treat your condition, one can be electronically transmitted to a nearby pharmacy*. Please take a moment to enroll today if you have not already done so. The enrollment process is free and takes just a few minutes. To enroll, please download the "[employer] 24/7 naima to your tablet or phone, or visit [email or web address] to enroll on your computer. And, as an [address] [place] [address] patient with a LoopMe account, the results of your visits will be scanned into your electronic medical record and your primary care provider will be able to view the scanned results. We urge you to continue to see your regular [facility] provider for your ongoing medical care. And while your primary care provider may not be the one available when you seek a Sb Stanton virtual visit, the peace of mind you get from getting a real diagnosis real time can be priceless. For more information on Sb Stanton, view our Frequently Asked Questions (FAQs) at [email or web address]. Sincerely, 
 
Christine Chadwick MD 
Chief Medical Officer [employer] *:  certain medications cannot be prescribed via Sb Stanton Providers Seen During Your Hospitalization Provider Specialty Primary office phone Hank Hopson MD Cardiology [phone] Your Primary Care Physician (PCP) Primary Care Physician Office Phone Office Fax Main [facility] [phone] You are allergic to the following No active allergies Recent Documentation Height Weight BMI Smoking Status 1.727 m 103.9 kg 34.82 kg/m2 Never Smoker Emergency Contacts Name Discharge Info Relation Home Work Mobile Ashlie Godinez DISCHARGE CAREGIVER [3] Spouse [3] [phone] Patient Belongings The following personal items are in your possession at time of discharge: 
  Dental Appliances: None  Visual Aid: None      Home Medications: None   Jewelry: Ring, With patient  Clothing: At bedside    Other Valuables: Eyeglasses, With patient Please provide this summary of care documentation to your next provider. Signatures-by signing, you are acknowledging that this After Visit Summary has been reviewed with you and you have received a copy. Patient Signature:  ____________________________________________________________ Date:  ____________________________________________________________  
  
Alexandra Higginbotham Provider Signature:  ____________________________________________________________ Date:  ____________________________________________________________

## 2018-08-09 NOTE — H&P (VIEW-ONLY)
Kaushik Nicole MD          NAME:  Dola Brunner   :   1950   MRN:   [id number]   PCP:  Korina Guerrero MD           Subjective: The patient is a 70y.o. year old male  who returns for a routine follow-up. Since the last visit, patient reports no change in exercise tolerance, chest pain, edema, medication intolerance, palpitations, shortness of breath, PND/orthopnea wheezing, sputum, syncope, dizziness or light headedness. Doing well. Past Medical History:   Diagnosis Date    Abnormal PSA 2011    CAD (coronary artery disease) 2011    Cardiomy[facility] 2011    Encounter for long-term (current) use of other medications 2011    Esophageal reflux 2011    Essential hypertension, benign 2011    GERD (gastroesophageal reflux disease) 2012    Heart attack ([facility]     Low back pain 2013    Mixed hyperlipidemia 2011    Nocturia 2011    Pacemaker         ICD-10-CM ICD-9-CM    1. VF (ventricular fibrillation) ([facility]) I49.01 427.41 CBC WITH AUTOMATED DIFF      METABOLIC PANEL, COMPREHENSIVE      PROTHROMBIN TIME + INR   2. Coronary artery disease involving native coronary artery of native heart without angina pectoris I25.10 414.01 CBC WITH AUTOMATED DIFF      METABOLIC PANEL, COMPREHENSIVE      PROTHROMBIN TIME + INR   3. VT (ventricular tachycardia) ([facility]) I47.2 427.1 CBC WITH AUTOMATED DIFF      METABOLIC PANEL, COMPREHENSIVE      PROTHROMBIN TIME + INR   4. Dilated cardiomyopathy ([facility]) I42.0 425.4 CBC WITH AUTOMATED DIFF      METABOLIC PANEL, COMPREHENSIVE      PROTHROMBIN TIME + INR   5.  Systolic CHF, chronic ([facility]) I50.22 428.22 CBC WITH AUTOMATED DIFF     877.2 METABOLIC PANEL, COMPREHENSIVE      PROTHROMBIN TIME + INR      Social History   Substance Use Topics    Smoking status: Never Smoker    Smokeless tobacco: Never Used    Alcohol use No      Family History   Problem Relation Age of Onset    Lung Disease Mother     Alcohol abuse Father Review of Systems  Cardiovascular: Negative except as noted in HPI      Objective:       Vitals:    07/18/18 1138 07/18/18 1139   BP: 110/80 120/80   Pulse: 60    Resp: 16    SpO2: 96%    Weight: 226 lb 12.8 oz (102.9 kg)    Height: 5' 8\" (1.727 m)     Body mass index is 34.48 kg/(m^2). General PE  Mental Status - Alert. General Appearance - Not in acute distress. Chest and Lung Exam   Inspection: Accessory muscles - No use of accessory muscles in breathing. Auscultation:   Breath sounds: - Normal.    Cardiovascular   Inspection: Jugular vein - Bilateral - Inspection Normal.  Palpation/Percussion:   Apical Impulse: - Normal.  Auscultation: Rhythm - Regular. Heart Sounds - S1 WNL and S2 WNL. No S3 or S4. Murmurs & Other Heart Sounds: Auscultation of the heart reveals - No Murmurs. Peripheral Vascular   Upper Extremity: Inspection - Bilateral - No Cyanotic nailbeds or Digital clubbing. Lower Extremity:   Palpation: Edema - Bilateral - No edema. Data Review:     EKG -  EKG: . LABS- @brieflabs@      Allergies reviewed  No Known Allergies    Medications reviewed  Current Outpatient Prescriptions   Medication Sig    carvedilol (COREG) 25 mg tablet Take 1 Tab by mouth two (2) times daily (with meals).  apixaban (ELIQUIS) 5 mg tablet Take 1 Tab by mouth two (2) times a day.  furosemide (LASIX) 20 mg tablet Take 1 Tab by mouth daily.  tamsulosin (FLOMAX) 0.4 mg capsule TAKE 1 CAPSULE BY MOUTH EVERY DAY    OTHER     ezetimibe (ZETIA) 10 mg tablet TAKE 1 TABLET BY MOUTH EVERY DAY    LEVITRA 20 mg tablet Take 20 mg by mouth as needed.  captopril (CAPOTEN) 12.5 mg tablet Take 1 Tab by mouth two (2) times a day.  CRESTOR 20 mg tablet TAKE 1 TABLET BY MOUTH EVERY DAY    nitroglycerin (NITROLINGUAL) 400 mcg/spray spray 1 Spray by SubLINGual route every five (5) minutes as needed.  cyanocobalamin 1,000 mcg tablet Take 1,000 mcg by mouth daily.     omega-3-dha-epa-dpa-fish oil 1,050-1,200 mg cap Take 1 Cap by mouth two (2) times a day.  D-METHORPHAN HB/PROMETH HCL (PROMETHAZINE-DM) Syrp Take 5 mL by mouth four (4) times daily as needed.  cholecalciferol, vitamin d3, (VITAMIN D) 1,000 unit tablet Take  by mouth daily.  multivitamins-minerals-lutein (CENTRUM SILVER) Tab Take  by mouth. No current facility-administered medications for this visit. Assessment:       ICD-10-CM ICD-9-CM    1. VF (ventricular fibrillation) ([facility]) I49.01 427.41 CBC WITH AUTOMATED DIFF      METABOLIC PANEL, COMPREHENSIVE      PROTHROMBIN TIME + INR   2. Coronary artery disease involving native coronary artery of native heart without angina pectoris I25.10 414.01 CBC WITH AUTOMATED DIFF      METABOLIC PANEL, COMPREHENSIVE      PROTHROMBIN TIME + INR   3. VT (ventricular tachycardia) ([facility]) I47.2 427.1 CBC WITH AUTOMATED DIFF      METABOLIC PANEL, COMPREHENSIVE      PROTHROMBIN TIME + INR   4. Dilated cardiomyopathy ([facility]) I42.0 425.4 CBC WITH AUTOMATED DIFF      METABOLIC PANEL, COMPREHENSIVE      PROTHROMBIN TIME + INR   5. Systolic CHF, chronic ([facility]) I50.22 428.22 CBC WITH AUTOMATED DIFF     327.7 METABOLIC PANEL, COMPREHENSIVE      PROTHROMBIN TIME + INR        Orders Placed This Encounter    CBC WITH AUTOMATED DIFF    METABOLIC PANEL, COMPREHENSIVE    PROTHROMBIN TIME + INR       Plan:     Inconclusive Myoview for ischemia with large areas of infarct. EF down. No cath for 18 yr. Ischemic CM with recent VF episode shocked by ICD. Plan cath to determine if there is an ischemic basis for this.     Anum Sandy MD

## 2018-08-09 NOTE — CARDIO/PULMONARY
CP Rehab Note: chart review    Admit: cardiac cath    Mhx: CAD, GERD, MI, hyperlipidemia, pacemaker    Never smoked. Patient sleeping soundly post wrist cath. No family present. Provided CAD folder for patient review later. CP Rehab will follow up.

## 2018-08-10 VITALS
RESPIRATION RATE: 16 BRPM | OXYGEN SATURATION: 96 % | BODY MASS INDEX: 34.71 KG/M2 | WEIGHT: 229 LBS | DIASTOLIC BLOOD PRESSURE: 75 MMHG | TEMPERATURE: 98 F | SYSTOLIC BLOOD PRESSURE: 112 MMHG | HEART RATE: 60 BPM | HEIGHT: 68 IN

## 2018-08-10 PROBLEM — R94.39 ABNORMAL STRESS ECG: Status: ACTIVE | Noted: 2018-08-10

## 2018-08-10 PROBLEM — Z95.5 S/P CORONARY ARTERY STENT PLACEMENT: Status: ACTIVE | Noted: 2018-08-10

## 2018-08-10 PROBLEM — I50.32 CHRONIC DIASTOLIC HF (HEART FAILURE) (HCC): Status: ACTIVE | Noted: 2018-08-10

## 2018-08-10 PROBLEM — I47.20 VT (VENTRICULAR TACHYCARDIA): Status: ACTIVE | Noted: 2018-08-10

## 2018-08-10 PROBLEM — I49.01 VF (VENTRICULAR FIBRILLATION) (HCC): Status: ACTIVE | Noted: 2018-08-10

## 2018-08-10 PROBLEM — I48.91 A-FIB (HCC): Status: ACTIVE | Noted: 2018-08-10

## 2018-08-10 PROBLEM — I50.22 SYSTOLIC CHF, CHRONIC (HCC): Status: ACTIVE | Noted: 2018-08-10

## 2018-08-10 PROBLEM — Z95.810 PRESENCE OF BIVENTRICULAR AICD: Status: ACTIVE | Noted: 2018-08-10

## 2018-08-10 LAB
ANION GAP SERPL CALC-SCNC: 7 MMOL/L (ref 5–15)
ATRIAL RATE: 60 BPM
ATRIAL RATE: 65 BPM
BUN SERPL-MCNC: 14 MG/DL (ref 6–20)
BUN/CREAT SERPL: 12 (ref 12–20)
CALCIUM SERPL-MCNC: 8.4 MG/DL (ref 8.5–10.1)
CALCULATED P AXIS, ECG09: 33 DEGREES
CALCULATED R AXIS, ECG10: -79 DEGREES
CALCULATED R AXIS, ECG10: -86 DEGREES
CALCULATED T AXIS, ECG11: -74 DEGREES
CALCULATED T AXIS, ECG11: 51 DEGREES
CHLORIDE SERPL-SCNC: 110 MMOL/L (ref 97–108)
CO2 SERPL-SCNC: 24 MMOL/L (ref 21–32)
CREAT SERPL-MCNC: 1.15 MG/DL (ref 0.7–1.3)
DIAGNOSIS, 93000: NORMAL
DIAGNOSIS, 93000: NORMAL
ERYTHROCYTE [DISTWIDTH] IN BLOOD BY AUTOMATED COUNT: 12.8 % (ref 11.5–14.5)
GLUCOSE SERPL-MCNC: 91 MG/DL (ref 65–100)
HCT VFR BLD AUTO: 40.1 % (ref 36.6–50.3)
HGB BLD-MCNC: 13.9 G/DL (ref 12.1–17)
MCH RBC QN AUTO: 32.5 PG (ref 26–34)
MCHC RBC AUTO-ENTMCNC: 34.7 G/DL (ref 30–36.5)
MCV RBC AUTO: 93.7 FL (ref 80–99)
NRBC # BLD: 0 K/UL (ref 0–0.01)
NRBC BLD-RTO: 0 PER 100 WBC
P-R INTERVAL, ECG05: 102 MS
P-R INTERVAL, ECG05: 214 MS
PLATELET # BLD AUTO: 124 K/UL (ref 150–400)
PMV BLD AUTO: 10.8 FL (ref 8.9–12.9)
POTASSIUM SERPL-SCNC: 3.7 MMOL/L (ref 3.5–5.1)
Q-T INTERVAL, ECG07: 444 MS
Q-T INTERVAL, ECG07: 464 MS
QRS DURATION, ECG06: 124 MS
QRS DURATION, ECG06: 152 MS
QTC CALCULATION (BEZET), ECG08: 461 MS
QTC CALCULATION (BEZET), ECG08: 464 MS
RBC # BLD AUTO: 4.28 M/UL (ref 4.1–5.7)
SODIUM SERPL-SCNC: 141 MMOL/L (ref 136–145)
VENTRICULAR RATE, ECG03: 60 BPM
VENTRICULAR RATE, ECG03: 65 BPM
WBC # BLD AUTO: 6.9 K/UL (ref 4.1–11.1)

## 2018-08-10 PROCEDURE — 93005 ELECTROCARDIOGRAM TRACING: CPT

## 2018-08-10 PROCEDURE — 74011250637 HC RX REV CODE- 250/637: Performed by: NURSE PRACTITIONER

## 2018-08-10 PROCEDURE — 85027 COMPLETE CBC AUTOMATED: CPT | Performed by: INTERNAL MEDICINE

## 2018-08-10 PROCEDURE — 80048 BASIC METABOLIC PNL TOTAL CA: CPT | Performed by: NURSE PRACTITIONER

## 2018-08-10 PROCEDURE — 36415 COLL VENOUS BLD VENIPUNCTURE: CPT | Performed by: NURSE PRACTITIONER

## 2018-08-10 RX ORDER — CLOPIDOGREL BISULFATE 75 MG/1
75 TABLET ORAL DAILY
Qty: 30 TAB | Refills: 11 | Status: SHIPPED | OUTPATIENT
Start: 2018-08-11 | End: 2018-08-10

## 2018-08-10 RX ORDER — CLOPIDOGREL BISULFATE 75 MG/1
75 TABLET ORAL DAILY
Qty: 30 TAB | Refills: 11 | Status: SHIPPED | OUTPATIENT
Start: 2018-08-11 | End: 2018-12-14 | Stop reason: SDUPTHER

## 2018-08-10 RX ADMIN — EZETIMIBE 10 MG: 10 TABLET ORAL at 08:40

## 2018-08-10 RX ADMIN — FUROSEMIDE 20 MG: 20 TABLET ORAL at 08:39

## 2018-08-10 RX ADMIN — TAMSULOSIN HYDROCHLORIDE 0.4 MG: 0.4 CAPSULE ORAL at 08:39

## 2018-08-10 RX ADMIN — Medication 5 ML: at 06:00

## 2018-08-10 RX ADMIN — CLOPIDOGREL BISULFATE 75 MG: 75 TABLET ORAL at 08:39

## 2018-08-10 RX ADMIN — APIXABAN 5 MG: 5 TABLET, FILM COATED ORAL at 08:39

## 2018-08-10 RX ADMIN — CAPTOPRIL 12.5 MG: 12.5 TABLET ORAL at 08:40

## 2018-08-10 RX ADMIN — CARVEDILOL 25 MG: 12.5 TABLET, FILM COATED ORAL at 08:39

## 2018-08-10 NOTE — DISCHARGE INSTRUCTIONS
[address] [place] [address]  [phone]        Patient ID:  Yoni Wilson  [id number]  36 y.o.  1950    Admit Date: 8/9/2018    Discharge Date: 8/10/2018     Admitting Physician: Hector Reza MD     Discharge Physician: Ya Abad NP    Admission Diagnoses:   cath    Discharge Diagnoses: Active Problems:    Cardiomyopathy ([facility] (2/16/2011)      Essential hypertension, benign (2/16/2011)      A-fib ([address] (8/10/2018)      Presence of biventricular AICD (8/10/2018)      Overview: [employer] BIVICD implanted 5/2017      Abnormal stress ECG (8/10/2018)      VT (ventricular tachycardia) ([facility]) (8/10/2018)      Chronic diastolic HF (heart failure) ([facility] (8/10/2018)      S/P coronary artery stent placement (8/10/2018)      Overview: 8/9/18 PCI/SUREKHA to LAD, RCA x 2      VF (ventricular fibrillation) ([facility] (8/10/2018)        Discharge Condition: Good    Cardiology Procedures this Admission:  Left heart catheterization with PCI    Disposition: home    Reference discharge instructions provided by nursing for diet and activity. Signed:  Ya Abad NP  8/10/2018  8:05 AM      Radial Cardiac Catheterization/Angiography Discharge Instructions    It is normal to feel tired the first couple days. Take it easy and follow the physicians instructions. CHECK THE CATHETER INSERTION SITE DAILY:    Remove the wrist dressing 24 hours after the procedure. You may shower 24 hours after the procedure. Wash with soap and water and pat dry. Gentle cleaning of the site with soap and water is sufficient, cover with a dry clean dressing or bandage. Do not apply creams or powders to the area. No soaking the wrist for 3 days. Leave the puncture site open to air after 24 hours post-procedure. CALL THE PHYSICIANS:     If the site becomes red, swollen or feels warm to the touch  If there is bleeding or drainage or if there is unusual pain at the radial site. If there is any minor oozing, you may apply a band-aid and remove after 12 hours. If the bleeding continues, hold pressure with the middle finger against the puncture site and the thumb against the back of the wrist,call 911 to be transported to the hospital.  DO NOT DRIVE YOURSELF, [address] ACTIVITY:   For the first 24 hours do not manipulate the wrist.  No lifting, pushing or pulling over 3-5 pounds with the affected wrist for 7 daysand no straining the insertion site. Do not life grocery bags or the garbage can, do not run the vacuum  or  for 7 days. Start with short walks as in the hospital and gradually increase as tolerated each day. It is recommended to walk 30 minutes 5-7 days per week. Follow your physicians instructions on activity. Avoid walking outside in extremes of heat or cold. Walk inside when it is cold and windy or hot and humid. Things to keep in mind:  No driving for at least 24 hours, or as designated by your physician. Limit the number of times you go up and down the stairs  Take rests and pace yourself with activity. Be careful and do not strain with bowel movements. MEDICATIONS:    Take all medications as prescribed  Call your physician if you have any questions  Keep an updated list of your medications with you at all times and give a list to your physician and pharmacist    SIGNS AND SYMPTOMS:   Be cautious of symptoms of angina or recurrent symptoms such as chest discomfort, unusual shortness of breath or fatigue. These could be symptoms of restenosis, a new blockage or a heart attack. If your symptoms are relieved with rest it is still recommended that you notify your physician of recurrent chest pain or discomfort.   For CHEST PAIN or symptoms of angina not relieved with rest:  If the discomfort is not relieved with rest, and you have been prescribed Nitroglycerin, take as directed (taken under the tongue, one at a time 5 minutes apart for a total of 3 doses). If the discomfort is not relieved after the 3rd nitroglycerin, call 911. If you have not been prescribed Nitroglycerin  and your chest discomfort is not relieved with rest, call 911. AFTER CARE:   Follow up with your physician as instructed. Follow a heart healthy diet with proper portion control, daily stress management, daily exercise, blood pressure and cholesterol control , and smoking cessation.

## 2018-08-10 NOTE — PROGRESS NOTES
Discharge instructions reviewed with patient and spouse. Allowed adequate time to ask questions, all questions answered. Printed copy of AVS given to patient. All belongings gathered, IV and tele discontinued. Transported via wheelchair to main entrance and into care of family.

## 2018-08-10 NOTE — PROGRESS NOTES
[address] [place] [address]  [phone]      Cardiology Progress Note      8/10/2018 12:34 PM    Admit Date: 8/9/2018    Admit Diagnosis:   cath    Subjective:     Yakelin Foreman has no complaints. S/P PCI/SUREKHA to LAD and RCA    Visit Vitals    /75 (BP 1 Location: Left arm, BP Patient Position: Sitting)    Pulse 60    Temp 98 °F (36.7 °C)    Resp 16    Ht 5' 8\" (1.727 m)    Wt 103.9 kg (229 lb)    SpO2 96%    BMI 34.82 kg/m2       Current Facility-Administered Medications   Medication Dose Route Frequency    apixaban (ELIQUIS) tablet 5 mg  5 mg Oral BID    captopril (CAPOTEN) tablet 12.5 mg  12.5 mg Oral BID    carvedilol (COREG) tablet 25 mg  25 mg Oral BID WITH MEALS    atorvastatin (LIPITOR) tablet 40 mg  40 mg Oral QHS    ezetimibe (ZETIA) tablet 10 mg  10 mg Oral DAILY    furosemide (LASIX) tablet 20 mg  20 mg Oral DAILY    tamsulosin (FLOMAX) capsule 0.4 mg  0.4 mg Oral DAILY    sodium chloride (NS) flush 5-10 mL  5-10 mL IntraVENous Q8H    sodium chloride (NS) flush 5-10 mL  5-10 mL IntraVENous PRN    acetaminophen (TYLENOL) tablet 650 mg  650 mg Oral Q4H PRN    clopidogrel (PLAVIX) tablet 75 mg  75 mg Oral DAILY    0.9% sodium chloride infusion  50 mL/hr IntraVENous CONTINUOUS     Current Outpatient Prescriptions   Medication Sig    [START ON 8/11/2018] clopidogrel (PLAVIX) 75 mg tab Take 1 Tab by mouth daily.  dextromethorphan-guaiFENesin (MUCINEX DM) 60-1,200 mg Tb12 Take  by mouth two (2) times a day.  carvedilol (COREG) 25 mg tablet Take 1 Tab by mouth two (2) times daily (with meals).  furosemide (LASIX) 20 mg tablet Take 1 Tab by mouth daily.  tamsulosin (FLOMAX) 0.4 mg capsule TAKE 1 CAPSULE BY MOUTH EVERY DAY    OTHER     ezetimibe (ZETIA) 10 mg tablet TAKE 1 TABLET BY MOUTH EVERY DAY    captopril (CAPOTEN) 12.5 mg tablet Take 1 Tab by mouth two (2) times a day.     CRESTOR 20 mg tablet TAKE 1 TABLET BY MOUTH EVERY DAY    cyanocobalamin 1,000 mcg tablet Take 1,000 mcg by mouth daily.  omega-3-dha-epa-dpa-fish oil 1,050-1,200 mg cap Take 1 Cap by mouth two (2) times a day.  cholecalciferol, vitamin d3, (VITAMIN D) 1,000 unit tablet Take  by mouth daily.  multivitamins-minerals-lutein (CENTRUM SILVER) Tab Take  by mouth.  apixaban (ELIQUIS) 5 mg tablet Take 1 Tab by mouth two (2) times a day.  LEVITRA 20 mg tablet Take 20 mg by mouth as needed.  nitroglycerin (NITROLINGUAL) 400 mcg/spray spray 1 Spray by SubLINGual route every five (5) minutes as needed. Objective:      Physical Exam:  General Appearance:  slightly obese older  male in no acute distress  Chest:   Clear  Cardiovascular:  Regular rate and rhythm, no murmur.   Abdomen:   Soft, non-tender, bowel sounds are active.   Extremities: right radial site D/I, no hematoma  Skin:  Warm and dry.     Data Review:   Recent Labs      08/10/18   0441   WBC  6.9   HGB  13.9   HCT  40.1   PLT  124*     Recent Labs      08/10/18   0441   NA  141   K  3.7   CL  110*   CO2  24   GLU  91   BUN  14   CREA  1.15   CA  8.4*       No results for input(s): TROIQ, CPK, CKMB in the last 72 hours. No intake or output data in the 24 hours ending 08/10/18 1234     Telemetry: AV paced    Assessment:     Active Problems:    Cardiomyopathy ([facility] (2/16/2011)      Essential hypertension, benign (2/16/2011)      A-fib ([address] (8/10/2018)      Presence of biventricular AICD (8/10/2018)      Overview: [employer] BIVICD implanted 5/2017      Abnormal stress ECG (8/10/2018)      VT (ventricular tachycardia) (HCC) (8/10/2018)      Chronic diastolic HF (heart failure) ([address] (8/10/2018)      S/P coronary artery stent placement (8/10/2018)      Overview: 8/9/18 PCI/SUREKHA to LAD, RCA x 2      VF (ventricular fibrillation) ([address] (8/10/2018)        Plan:     Abnormal stress test with an episode of BIVICD shock due to Vfib:  PCI/SUREKHA to LAD and RCA.   Hopefully this was the culprit that initiated the Vfib. Continue on ASA, Coreg, statin and start Plavix 75mg daily x 1 year. Cardiomyopthy, dilated and ischemic with EF 55% with BIVICD:  Stable continue on BB, ACEI and diuretics.       Discharge to home with f/u to Dr. John Lee [facility]  Cardiology      Addendum:  Patient as not discharged to home on ASA due to use of antiplatelet and anticoagulation and increased risk of bleeding

## 2018-08-15 ENCOUNTER — OFFICE VISIT (OUTPATIENT)
Dept: FAMILY MEDICINE CLINIC | Age: 68
End: 2018-08-15

## 2018-08-15 VITALS
BODY MASS INDEX: 34.1 KG/M2 | HEIGHT: 68 IN | RESPIRATION RATE: 18 BRPM | WEIGHT: 225 LBS | OXYGEN SATURATION: 97 % | HEART RATE: 60 BPM | DIASTOLIC BLOOD PRESSURE: 70 MMHG | SYSTOLIC BLOOD PRESSURE: 122 MMHG | TEMPERATURE: 98 F

## 2018-08-15 DIAGNOSIS — Z95.5 S/P CORONARY ARTERY STENT PLACEMENT: ICD-10-CM

## 2018-08-15 DIAGNOSIS — K21.9 GASTROESOPHAGEAL REFLUX DISEASE WITHOUT ESOPHAGITIS: ICD-10-CM

## 2018-08-15 DIAGNOSIS — Z23 ENCOUNTER FOR IMMUNIZATION: ICD-10-CM

## 2018-08-15 DIAGNOSIS — I50.32 CHRONIC DIASTOLIC HF (HEART FAILURE) (HCC): ICD-10-CM

## 2018-08-15 DIAGNOSIS — I10 ESSENTIAL HYPERTENSION, BENIGN: ICD-10-CM

## 2018-08-15 DIAGNOSIS — Z00.00 MEDICARE ANNUAL WELLNESS VISIT, SUBSEQUENT: Primary | ICD-10-CM

## 2018-08-15 DIAGNOSIS — Z11.59 ENCOUNTER FOR HEPATITIS C SCREENING TEST FOR LOW RISK PATIENT: ICD-10-CM

## 2018-08-15 DIAGNOSIS — E78.2 MIXED HYPERLIPIDEMIA: ICD-10-CM

## 2018-08-15 DIAGNOSIS — I42.9 CARDIOMYOPATHY, UNSPECIFIED TYPE (HCC): ICD-10-CM

## 2018-08-15 LAB
BILIRUB UR QL STRIP: NEGATIVE
GLUCOSE UR-MCNC: NEGATIVE MG/DL
KETONES P FAST UR STRIP-MCNC: NEGATIVE MG/DL
PH UR STRIP: 6 [PH] (ref 4.6–8)
PROT UR QL STRIP: NEGATIVE
SP GR UR STRIP: 1.02 (ref 1–1.03)
UA UROBILINOGEN AMB POC: NORMAL (ref 0.2–1)
URINALYSIS CLARITY POC: CLEAR
URINALYSIS COLOR POC: YELLOW
URINE BLOOD POC: NEGATIVE
URINE LEUKOCYTES POC: NORMAL
URINE NITRITES POC: NEGATIVE

## 2018-08-15 NOTE — PROGRESS NOTES
This is the Subsequent Medicare Annual Wellness Exam, performed 12 months or more after the Initial AWV or the last Subsequent AWV  F/u hbp,chol,cad s/p recent multiple stents  I have reviewed the patient's medical history in detail and updated the computerized patient record. History     Past Medical History:   Diagnosis Date    A-fib [facility]) 8/10/2018    Abnormal PSA 2/16/2011    Abnormal stress ECG 8/10/2018    CAD (coronary artery disease) 2/16/2011    Cardiomyopathy ([facility] 2/16/2011    Chronic diastolic HF (heart failure) ([facility] 8/10/2018    Encounter for long-term (current) use of other medications 2/16/2011    Esophageal reflux 2/16/2011    Essential hypertension, benign 2/16/2011    GERD (gastroesophageal reflux disease) 5/14/2012    Heart attack ([facility]     Low back pain 7/2/2013    Mixed hyperlipidemia 2/16/2011    Nocturia 2/16/2011    Pacemaker     Presence of biventricular AICD 8/10/2018    [employer] BIVICD implanted 5/2017    S/P coronary artery stent placement 8/10/2018    8/9/18 PCI/SUREKHA to LAD, RCA x 2    Systolic CHF, chronic (HCC) 8/10/2018    [facility]F [facility]ventricular fibrillation) ([facility]) 8/10/2018    VT (ventricular tachycardia) ([facility] 8/10/2018      Past Surgical History:   Procedure Laterality Date    HX CERVICAL FUSION  1997    c4,5,6    MT COLONOSCOPY FLX DX W/COLLJ SPEC WHEN PFRMD  5/14/2012         MT EGD TRANSORAL BIOPSY SINGLE/MULTIPLE  5/14/2012         REMOVAL GALLBLADDER       Current Outpatient Prescriptions   Medication Sig Dispense Refill    clopidogrel (PLAVIX) 75 mg tab Take 1 Tab by mouth daily. 30 Tab 11    dextromethorphan-guaiFENesin (MUCINEX DM) 60-1,200 mg Tb12 Take  by mouth two (2) times a day.  carvedilol (COREG) 25 mg tablet Take 1 Tab by mouth two (2) times daily (with meals). 60 Tab 1    apixaban (ELIQUIS) 5 mg tablet Take 1 Tab by mouth two (2) times a day. 60 Tab 3    furosemide (LASIX) 20 mg tablet Take 1 Tab by mouth daily.  30 Tab 4    tamsulosin (FLOMAX) 0.4 mg capsule TAKE 1 CAPSULE BY MOUTH EVERY DAY 30 Cap 4    OTHER       ezetimibe (ZETIA) 10 mg tablet TAKE 1 TABLET BY MOUTH EVERY DAY 90 Tab 1    LEVITRA 20 mg tablet Take 20 mg by mouth as needed. 99    captopril (CAPOTEN) 12.5 mg tablet Take 1 Tab by mouth two (2) times a day. 180 Tab 3    CRESTOR 20 mg tablet TAKE 1 TABLET BY MOUTH EVERY DAY 90 Tab 2    cyanocobalamin 1,000 mcg tablet Take 1,000 mcg by mouth daily.  omega-3-dha-epa-dpa-fish oil 1,050-1,200 mg cap Take 1 Cap by mouth two (2) times a day.  cholecalciferol, vitamin d3, (VITAMIN D) 1,000 unit tablet Take  by mouth daily.  multivitamins-minerals-lutein (CENTRUM SILVER) Tab Take  by mouth.  nitroglycerin (NITROLINGUAL) 400 mcg/spray spray 1 Spray by SubLINGual route every five (5) minutes as needed. 1 Bottle 1     No Known Allergies  Family History   Problem Relation Age of Onset    Lung Disease Mother     Alcohol abuse Father     Heart Disease Father      Social History   Substance Use Topics    Smoking status: Never Smoker    Smokeless tobacco: Never Used    Alcohol use No     Patient Active Problem List   Diagnosis Code    CAD (coronary artery disease) I25.10    Mixed hyperlipidemia E78.2    [facility] I42.9    Esophageal reflux K21.9    Encounter for long-term (current) use of other medications Z79.899    Nocturia R35.1    Abnormal PSA R97.20    Essential hypertension, benign I10    GERD (gastroesophageal reflux disease) K21.9    Hematochezia K92.1    Screen for colon cancer Z12.11    Automatic implantable cardioverter-defibrillator in situ Z95.810    Low back pain M54.5    Severe obesity (BMI 35.0-39. 9) with comorbidity (HCC) E66.01    [facility]-fib ([facility] I48.91    Presence of biventricular AICD Z95.810    Abnormal stress ECG R94.39    VT (ventricular tachycardia) ([facility]) I47.2    Chronic diastolic HF (heart failure) ([facility]) S81.18    Systolic CHF, chronic ([facility]) I50.22    S/P coronary artery stent placement Z95.5    VF (ventricular fibrillation) ([facility]) I49.01       Depression Risk Factor Screening:     PHQ over the last two weeks 8/15/2018   Little interest or pleasure in doing things Not at all   Feeling down, depressed, irritable, or hopeless Not at all   Total Score PHQ 2 0     Alcohol Risk Factor Screening: You do not drink alcohol or very rarely. Functional Ability and Level of Safety:   Hearing Loss  Hearing is good. Activities of Daily Living  The home contains: no safety equipment. Patient does total self care    Fall Risk  Fall Risk Assessment, last 12 mths 8/15/2018   Able to walk? Yes   Fall in past 12 months?  No       Abuse Screen  Patient is not abused    Cognitive Screening   Evaluation of Cognitive Function:  Has your family/caregiver stated any concerns about your memory: no  Normal      Review of Systems - General ROS: negative  ENT ROS: negative  Respiratory ROS: no cough, shortness of breath, or wheezing  Cardiovascular ROS: no chest pain or dyspnea on exertion  Gastrointestinal ROS: no abdominal pain, change in bowel habits, or black or bloody stools  Genito-Urinary ROS: no dysuria, trouble voiding, or hematuria  Musculoskeletal ROS: negative        General appearance - alert, well appearing, and in no distress  Mental status - alert, oriented to person, place, and time  Eyes - pupils equal and reactive, extraocular eye movements intact  Ears - bilateral TM's and external ear canals normal  Nose - normal and patent, no erythema, discharge or polyps  Mouth - mucous membranes moist, pharynx normal without lesions  Neck - supple, no significant adenopathy, carotids upstroke normal bilaterally, no bruits, thyroid exam: thyroid is normal in size without nodules or tenderness  Chest - clear to auscultation, no wheezes, rales or rhonchi, symmetric air entry  Heart - normal rate, regular rhythm, normal S1, S2, no murmurs, rubs, clicks or gallops  Abdomen - soft, nontender, nondistended, no masses or organomegaly  Musculoskeletal - no joint tenderness, deformity or swelling  Rectal:stool heme negative prostate unremarkable  Patient Care Team   Patient Care Team:  Kt Beasley MD as PCP - General  Janita Merlin, EDE Lyman MD as Physician (Cardiology)  Roro Ward MD as Physician (Cardiology)    Assessment/Plan   Education and counseling provided:  Are appropriate based on today's review and evaluation        Health Maintenance Due   Topic Date Due    Hepatitis C Screening  1950    Pneumococcal 65+ Low/Medium Risk (1 of 2 - PCV13) 02/09/2014    GLAUCOMA SCREENING Q2Y  01/15/2017    COLONOSCOPY  05/14/2017    MEDICARE YEARLY EXAM  03/14/2018    Influenza Age 9 to Adult  08/01/2018

## 2018-08-15 NOTE — PROGRESS NOTES
Chief Complaint   Patient presents with    Well Male     Pt getting annual physical.    Hand Pain     Pt having L hand pain.

## 2018-08-15 NOTE — MR AVS SNAPSHOT
[facility] [place] 
 
 
 [address] [facility] [address] [id number] [phone] Patient: Donnie Ivan MRN: [id number] OOH:2/0/5258 Visit Information Date & Time Provider Department Dept. Phone Encounter #  
 8/15/2018  2:15 PM Krystina Rodriguez, [facility] [phone] Follow-up Instructions Return in about 6 months (around 2/15/2019). Your Appointments 8/21/2018 10:15 AM  
ESTABLISHED PATIENT with Colette Hoffman MD  
[facility] [address]) Appt Note: Dr. Annette Mcgill, f/u from [address] [facility] , [address] [place] [address] [place] [phone]  
[phone]  
  
   
 Ariel Maie [address]  
  
    
 9/18/2018  8:15 AM  
PROCEDURE with PACEMAKER, [facility] [address]) Appt Note: 3 mo bsc bivicd. no landline [address] [facility]  
[phone] [address] [facility]  
  
    
 9/18/2018  9:00 AM  
ESTABLISHED PATIENT with Sergio Dudley MD  
[facility] [address]) Appt Note: Dr Mandy Davis f/u,jar  
 [address] [facility]  
[phone] [address] [facility] Upcoming Health Maintenance Date Due Hepatitis C Screening 1950 Pneumococcal 65+ Low/Medium Risk (1 of 2 - PCV13) 2/9/2014 GLAUCOMA SCREENING Q2Y 1/15/2017 COLONOSCOPY 5/14/2017 MEDICARE YEARLY EXAM 3/14/2018 Influenza Age 5 to Adult 8/1/2018 DTaP/Tdap/Td series (2 - Td) 7/16/2025 Allergies as of 8/15/2018  Review Complete On: 8/15/2018 By: Dontae Hilario No Known Allergies Current Immunizations  Reviewed on 8/9/2018 Name Date Pneumococcal Conjugate (PCV-13)  Incomplete Tdap 7/16/2015 Not reviewed this visit You Were Diagnosed With   
  
 Codes Comments Medicare annual wellness visit, subsequent    -  Primary ICD-10-CM: Z00.00 ICD-9-CM: V70.0 Cardiomyopathy, unspecified type ([address]     ICD-10-CM: I42.9 ICD-9-CM: 425.4 S/P coronary artery stent placement     ICD-10-CM: Z95.5 ICD-9-CM: V45.82 Chronic diastolic HF (heart failure) (HCC)     ICD-10-CM: I50.32 
ICD-9-CM: 428.32 Mixed hyperlipidemia     ICD-10-CM: E78.2 ICD-9-CM: 272.2 Essential hypertension, benign     ICD-10-CM: I10 
ICD-9-CM: 401.1 Gastroesophageal reflux disease without esophagitis     ICD-10-CM: K21.9 ICD-9-CM: 530.81 Encounter for hepatitis C screening test for low risk patient     ICD-10-CM: Z11.59 
ICD-9-CM: V73.89 Encounter for immunization     ICD-10-CM: W09 ICD-9-CM: V03.89 Vitals BP Pulse Temp Resp Height(growth percentile) Weight(growth percentile) 122/70 (BP 1 Location: Left arm, BP Patient Position: Sitting) 60 98 °F (36.7 °C) (Oral) 18 5' 8\" (1.727 m) 225 lb (102.1 kg) SpO2 BMI Smoking Status 97% 34.21 kg/m2 Never Smoker Vitals History BMI and BSA Data Body Mass Index Body Surface Area  
 34.21 kg/m 2 2.21 m 2 Preferred Pharmacy Pharmacy Name Phone [id number] [phone] Your Updated Medication List  
  
   
This list is accurate as of 8/15/18  2:51 PM.  Always use your most recent med list.  
  
  
  
  
 apixaban 5 mg tablet Commonly known as:  Maria Del Carmen Niece Take 1 Tab by mouth two (2) times a day. captopril 12.5 mg tablet Commonly known as:  CAPOTEN Take 1 Tab by mouth two (2) times a day. carvedilol 25 mg tablet Commonly known as:  Wileen Tijerina Take 1 Tab by mouth two (2) times daily (with meals). CENTRUM SILVER Tab tablet Generic drug:  multivitamins-minerals-lutein Take  by mouth. clopidogrel 75 mg Tab Commonly known as:  PLAVIX Take 1 Tab by mouth daily. CRESTOR 20 mg tablet Generic drug:  rosuvastatin TAKE 1 TABLET BY MOUTH EVERY DAY  
  
 cyanocobalamin 1,000 mcg tablet Take 1,000 mcg by mouth daily. ezetimibe 10 mg tablet Commonly known as:  Efrem Och TAKE 1 TABLET BY MOUTH EVERY DAY  
  
 furosemide 20 mg tablet Commonly known as:  LASIX Take 1 Tab by mouth daily. LEVITRA 20 mg tablet Generic drug:  vardenafil Take 20 mg by mouth as needed. MUCINEX DM 60-1,200 mg Tb12 Generic drug:  dextromethorphan-guaiFENesin Take  by mouth two (2) times a day. nitroglycerin 400 mcg/spray spray Commonly known as:  NITROLINGUAL  
1 [place] by SubLINGual route every five (5) minutes as needed. omega-3-dha-epa-dpa-fish oil 1,050-1,200 mg Cap Take 1 Cap by mouth two (2) times a day. OTHER  
  
 tamsulosin 0.4 mg capsule Commonly known as:  FLOMAX TAKE 1 CAPSULE BY MOUTH EVERY DAY  
  
 VITAMIN D3 1,000 unit tablet Generic drug:  cholecalciferol Take  by mouth daily. We Performed the Following AMB POC URINALYSIS DIP STICK AUTO W/O MICRO [38508 CPT(R)] CHOLESTEROL, TOTAL [25140 CPT(R)] CK [id number] CPT(R)] HEPATITIS C AB [53159 CPT(R)] METABOLIC PANEL, COMPREHENSIVE [09967 CPT(R)] PNEUMOCOCCAL CONJ VACCINE 13 VALENT IM [id number] CPT(R)] PSA, DIAGNOSTIC (PROSTATE SPECIFIC AG) O2592018 CPT(R)] Follow-up Instructions Return in about 6 months (around 2/15/2019). To-Do List   
 08/29/2018 9:00 AM  
  Appointment with [facility] at [facility] ([phone]) Introducing [facility]! [employer] introduces [employer] patient portal. Now you can access parts of your medical record, email your doctor's office, and request medication refills online. 1. In your internet browser, go to [email or web address] 2. Click on the First Time User? Click Here link in the Sign In box. You will see the New Member Sign Up page. 3. Enter your [employer] Access Code exactly as it appears below. You will not need to use this code after youve completed the sign-up process. If you do not sign up before the expiration date, you must request a new code. · [employer] Access Code: [email or web address] Expires: 9/17/2018  8:40 AM 
 
4. Enter the last four digits of your Social Security Number (xxxx) and Date of Birth (mm/dd/yyyy) as indicated and click Submit. You will be taken to the next sign-up page. 5. Create a BCN[employer] ID. This will be your [employer] login ID and cannot be changed, so think of one that is secure and easy to remember. 6. Create a [employer] password. You can change your password at any time. 7. Enter your Password Reset Question and Answer. This can be used at a later time if you forget your password. 8. Enter your e-mail address. You will receive e-mail notification when new information is available in [address] 9. Click Sign Up. You can now view and download portions of your medical record. 10. Click the Download Summary menu link to download a portable copy of your medical information. If you have questions, please visit the Frequently Asked Questions section of the [employer] website. Remember, [employer] is NOT to be used for urgent needs. For medical emergencies, dial 911. Now available from your iPhone and Android! Please provide this summary of care documentation to your next provider. Your primary care clinician is listed as Eileen Aguirre. If you have any questions after today's visit, please call [phone].

## 2018-08-16 LAB
ALBUMIN SERPL-MCNC: 4.3 G/DL (ref 3.6–4.8)
ALBUMIN/GLOB SERPL: 2.2 {RATIO} (ref 1.2–2.2)
ALP SERPL-CCNC: 43 IU/L (ref 39–117)
ALT SERPL-CCNC: 27 IU/L (ref 0–44)
AST SERPL-CCNC: 23 IU/L (ref 0–40)
BILIRUB SERPL-MCNC: 0.8 MG/DL (ref 0–1.2)
BUN SERPL-MCNC: 14 MG/DL (ref 8–27)
BUN/CREAT SERPL: 11 (ref 10–24)
CALCIUM SERPL-MCNC: 9.5 MG/DL (ref 8.6–10.2)
CHLORIDE SERPL-SCNC: 108 MMOL/L (ref 96–106)
CHOLEST SERPL-MCNC: 111 MG/DL (ref 100–199)
CK SERPL-CCNC: 227 U/L (ref 24–204)
CO2 SERPL-SCNC: 20 MMOL/L (ref 20–29)
CREAT SERPL-MCNC: 1.25 MG/DL (ref 0.76–1.27)
GLOBULIN SER CALC-MCNC: 2 G/DL (ref 1.5–4.5)
GLUCOSE SERPL-MCNC: 117 MG/DL (ref 65–99)
HCV AB S/CO SERPL IA: <0.1 S/CO RATIO (ref 0–0.9)
INTERPRETATION: NORMAL
POTASSIUM SERPL-SCNC: 4 MMOL/L (ref 3.5–5.2)
PROT SERPL-MCNC: 6.3 G/DL (ref 6–8.5)
PSA SERPL-MCNC: 4.1 NG/ML (ref 0–4)
SODIUM SERPL-SCNC: 142 MMOL/L (ref 134–144)

## 2018-08-21 ENCOUNTER — OFFICE VISIT (OUTPATIENT)
Dept: CARDIOLOGY CLINIC | Age: 68
End: 2018-08-21

## 2018-08-21 VITALS
RESPIRATION RATE: 18 BRPM | HEART RATE: 60 BPM | DIASTOLIC BLOOD PRESSURE: 84 MMHG | WEIGHT: 223 LBS | SYSTOLIC BLOOD PRESSURE: 118 MMHG | BODY MASS INDEX: 33.8 KG/M2 | OXYGEN SATURATION: 98 % | HEIGHT: 68 IN

## 2018-08-21 DIAGNOSIS — I25.10 CORONARY ARTERY DISEASE INVOLVING NATIVE CORONARY ARTERY OF NATIVE HEART WITHOUT ANGINA PECTORIS: Primary | ICD-10-CM

## 2018-08-21 DIAGNOSIS — I50.22 SYSTOLIC CHF, CHRONIC (HCC): ICD-10-CM

## 2018-08-21 DIAGNOSIS — I42.0 DILATED CARDIOMYOPATHY (HCC): ICD-10-CM

## 2018-08-21 DIAGNOSIS — I48.0 PAF (PAROXYSMAL ATRIAL FIBRILLATION) (HCC): ICD-10-CM

## 2018-08-21 DIAGNOSIS — I10 ESSENTIAL HYPERTENSION, BENIGN: ICD-10-CM

## 2018-08-21 DIAGNOSIS — I49.01 VF (VENTRICULAR FIBRILLATION) (HCC): ICD-10-CM

## 2018-08-21 DIAGNOSIS — Z95.810 BIVENTRICULAR ICD (IMPLANTABLE CARDIOVERTER-DEFIBRILLATOR) IN PLACE: ICD-10-CM

## 2018-08-21 NOTE — PROGRESS NOTES
Baldo Sierra MD          NAME:  Jeremiah Cotton   :   1950   MRN:   [id number]   PCP:  Hermelinda Roche MD           Subjective:     Mr. Ronny Conti is a 70y.o. year old male, last seen one month ago. He then underwent a cardiac cath on 18 with two vessel stenting. Today, he states he is doing well. His exercise tolerance has significantly improved. He is adherent with Plavix. Patient denies any exertional chest pain, dyspnea, palpitations, syncope, orthopnea, edema or paroxysmal nocturnal dyspnea. Past Medical History:   Diagnosis Date    A-fib [facility]) 8/10/2018    Abnormal PSA 2011    Abnormal stress ECG 8/10/2018    CAD (coronary artery disease) 2011    Cardiomyopathy ([facility] 2011    Chronic diastolic HF (heart failure) ([facility] 8/10/2018    Encounter for long-term (current) use of other medications 2011    Esophageal reflux 2011    Essential hypertension, benign 2011    GERD (gastroesophageal reflux disease) 2012    Heart attack ([facility]     Low back pain 2013    Mixed hyperlipidemia 2011    Nocturia 2011    Pacemaker     Presence of biventricular AICD 8/10/2018    [employer] BIVICD implanted 2017    S/P coronary artery stent placement 8/10/2018    8/9/18 PCI/SUREKHA to LAD, RCA x 2    Systolic CHF, chronic ([facility]) 8/10/2018    VF (ventricular fibrillation) ([facility]) 8/10/2018    VT (ventricular tachycardia) ([facility] 8/10/2018        ICD-10-CM ICD-9-CM    1. Coronary artery disease involving native coronary artery of native heart without angina pectoris I25.10 414.01    2. VF (ventricular fibrillation) ([facility] I49.01 427.41    3. Dilated cardiomyopathy ([facility]) I42.0 425.4    4. Systolic CHF, chronic ([facility]) I50.22 428.22      428.0    5. Essential hypertension, benign I10 401.1 AMB POC EKG ROUTINE W/ 12 LEADS, INTER & REP   6. PAF (paroxysmal atrial fibrillation) ([facility]) I48.0 427.31    7.  Biventricular ICD (implantable cardioverter-defibrillator) in place Z95.810 V45.02       Social History   Substance Use Topics    Smoking status: Never Smoker    Smokeless tobacco: Never Used    Alcohol use No      Family History   Problem Relation Age of Onset    Lung Disease Mother     Alcohol abuse Father     Heart Disease Father         Review of Systems  Cardiovascular: Negative except as noted in HPI      Objective:       Vitals:    08/21/18 1009   BP: 118/84   Pulse: 60   Resp: 18   SpO2: 98%   Weight: 223 lb (101.2 kg)   Height: 5' 8\" (1.727 m)    Body mass index is 33.91 kg/(m^2). General PE  Mental Status - Alert. General Appearance - Not in acute distress. Chest and Lung Exam   Inspection: Accessory muscles - No use of accessory muscles in breathing. Auscultation:   Breath sounds: - Normal.    Cardiovascular   Inspection: Jugular vein - Bilateral - Inspection Normal.  Palpation/Percussion:   Apical Impulse: - Normal.  Auscultation: Rhythm - Regular. Heart Sounds - S1 WNL and S2 WNL. No S3 or S4. Murmurs & Other Heart Sounds: Auscultation of the heart reveals - No Murmurs. Peripheral Vascular   Upper Extremity: Inspection - Bilateral - No Cyanotic nailbeds or Digital clubbing. Lower Extremity:   Palpation: Edema - Bilateral - No edema. Data Review:     EKG -  EKG: . LABS- @brieflabs@      Allergies reviewed  No Known Allergies    Medications reviewed  Current Outpatient Prescriptions   Medication Sig    clopidogrel (PLAVIX) 75 mg tab Take 1 Tab by mouth daily.  carvedilol (COREG) 25 mg tablet Take 1 Tab by mouth two (2) times daily (with meals).  apixaban (ELIQUIS) 5 mg tablet Take 1 Tab by mouth two (2) times a day.  furosemide (LASIX) 20 mg tablet Take 1 Tab by mouth daily.  tamsulosin (FLOMAX) 0.4 mg capsule TAKE 1 CAPSULE BY MOUTH EVERY DAY    OTHER     ezetimibe (ZETIA) 10 mg tablet TAKE 1 TABLET BY MOUTH EVERY DAY    LEVITRA 20 mg tablet Take 20 mg by mouth as needed.     captopril (CAPOTEN) 12.5 mg tablet Take 1 Tab by mouth two (2) times a day. (Patient taking differently: Take 12.5 mg by mouth two (2) times a day. Indications: HALF TAB BID)    CRESTOR 20 mg tablet TAKE 1 TABLET BY MOUTH EVERY DAY (Patient taking differently: HALF TAB DAILY)    nitroglycerin (NITROLINGUAL) 400 mcg/spray spray 1 Spray by SubLINGual route every five (5) minutes as needed.  cyanocobalamin 1,000 mcg tablet Take 1,000 mcg by mouth daily.  omega-3-dha-epa-dpa-fish oil 1,050-1,200 mg cap Take 1 Cap by mouth two (2) times a day.  cholecalciferol, vitamin d3, (VITAMIN D) 1,000 unit tablet Take  by mouth daily.  multivitamins-minerals-lutein (CENTRUM SILVER) Tab Take  by mouth.  dextromethorphan-guaiFENesin (MUCINEX DM) 60-1,200 mg Tb12 Take  by mouth two (2) times a day. No current facility-administered medications for this visit. Assessment:       ICD-10-CM ICD-9-CM    1. Coronary artery disease involving native coronary artery of native heart without angina pectoris I25.10 414.01    2. VF (ventricular fibrillation) ([facility] I49.01 427.41    3. Dilated cardiomyopathy (HCC) I42.0 425.4    4. Systolic CHF, chronic ([facility]) I50.22 428.22      428.0    5. Essential hypertension, benign I10 401.1 AMB POC EKG ROUTINE W/ 12 LEADS, INTER & REP   6. PAF (paroxysmal atrial fibrillation) ([facility]) I48.0 427.31    7. Biventricular ICD (implantable cardioverter-defibrillator) in place Z95.810 V45.02         Orders Placed This Encounter    AMB POC EKG ROUTINE W/ 12 LEADS, INTER & REP     Order Specific Question:   Reason for Exam:     Answer:   ROUTINE       Plan:     Mr. Cas Pretty is here today for a follow up of a cardiac cath on 8/9/18 with 2 vessel stenting. He states he is doing well, and his exercise tolerance has improved. He is adherent with Plavix. His BP and EKG today are normal.      Follow up in 6 or PRN    Written by Tyree Chavez, as dictated by Dr. Gracie Nguyen.

## 2018-08-29 ENCOUNTER — HOSPITAL ENCOUNTER (OUTPATIENT)
Dept: CARDIAC REHAB | Age: 68
Discharge: HOME | End: 2018-08-29
Payer: MEDICARE

## 2018-08-29 VITALS — BODY MASS INDEX: 34.13 KG/M2 | HEIGHT: 68 IN | WEIGHT: 225.2 LBS

## 2018-08-29 DIAGNOSIS — Z95.5 S/P CORONARY ARTERY STENT PLACEMENT: ICD-10-CM

## 2018-08-29 PROCEDURE — 93798 PHYS/QHP OP CAR RHAB W/ECG: CPT

## 2018-08-29 NOTE — CARDIO/PULMONARY
Cardiopulmonary Rehab Orientation:      Patient is a 70year old patient of Dr. Eugenie Hdez who presents to rehab for PCI (8/9/2018). Patient had a Vfib in April of 2018 with discharge of his ICD. He had an abnormal nuclear stress test and cardiac cath which resulted in 3 vessel stenting. Medical history consists of MI, PCI, Afib, HTN, hyperlipidemia, HF, ICD, and GERD. EF 30%    Pt's VS were as follows: BP on R arm 131/77,  L arm 132/79, HR 59, oxygen saturation 96% RA   Lungs are clear to auscultation. Pt denies cough. No apparent edema noted. BMI 34.31 . Heart rhythm on monitor showed paced with PAC's and MF PVC's/ trigeminy. Smoking history assessed and patient is a non smoker. Pt immunizations, allergies, and medications were reviewed and are up to date. Patient has decreased mobility in his right shoulder and carpel tunnel in his left thumb. Patient completed 6MWT without difficulty or signs or symptoms. Pt reported goals are:  1. Walk 3 days per week for 15 minutes  2. Increase intake of fruits and vegetables to 5 servings daily. 3. Decrease intake of diet sodas. PSYCHOSOCIAL:  Patient states he has very little stress. He does have to help his wife who is a polio survivor but states that is a low level of stress. He has a supportive wife and friends. He enjoys reading for stress relief.

## 2018-08-31 ENCOUNTER — HOSPITAL ENCOUNTER (OUTPATIENT)
Dept: CARDIAC REHAB | Age: 68
Discharge: HOME | End: 2018-08-31
Payer: MEDICARE

## 2018-08-31 VITALS — BODY MASS INDEX: 34.06 KG/M2 | WEIGHT: 224 LBS

## 2018-08-31 PROCEDURE — 93798 PHYS/QHP OP CAR RHAB W/ECG: CPT

## 2018-09-04 ENCOUNTER — HOSPITAL ENCOUNTER (OUTPATIENT)
Dept: CARDIAC REHAB | Age: 68
Discharge: HOME | End: 2018-09-04
Payer: MEDICARE

## 2018-09-04 VITALS — WEIGHT: 224 LBS | BODY MASS INDEX: 34.06 KG/M2

## 2018-09-04 PROCEDURE — 93798 PHYS/QHP OP CAR RHAB W/ECG: CPT

## 2018-09-07 ENCOUNTER — HOSPITAL ENCOUNTER (OUTPATIENT)
Dept: CARDIAC REHAB | Age: 68
Discharge: HOME | End: 2018-09-07
Payer: MEDICARE

## 2018-09-07 PROCEDURE — 93798 PHYS/QHP OP CAR RHAB W/ECG: CPT

## 2018-09-10 ENCOUNTER — HOSPITAL ENCOUNTER (OUTPATIENT)
Dept: CARDIAC REHAB | Age: 68
Discharge: HOME | End: 2018-09-10
Payer: MEDICARE

## 2018-09-10 VITALS — WEIGHT: 224 LBS | BODY MASS INDEX: 34.06 KG/M2

## 2018-09-10 PROCEDURE — 93798 PHYS/QHP OP CAR RHAB W/ECG: CPT

## 2018-09-11 DIAGNOSIS — I42.9 CARDIOMYOPATHY, UNSPECIFIED TYPE (HCC): ICD-10-CM

## 2018-09-11 RX ORDER — CARVEDILOL 25 MG/1
25 TABLET ORAL 2 TIMES DAILY WITH MEALS
Qty: 60 TAB | Refills: 1 | Status: SHIPPED | OUTPATIENT
Start: 2018-09-11 | End: 2018-11-12 | Stop reason: SDUPTHER

## 2018-09-17 ENCOUNTER — HOSPITAL ENCOUNTER (OUTPATIENT)
Dept: CARDIAC REHAB | Age: 68
Discharge: HOME | End: 2018-09-17
Payer: MEDICARE

## 2018-09-17 VITALS — WEIGHT: 228 LBS | BODY MASS INDEX: 34.67 KG/M2

## 2018-09-17 PROCEDURE — 93798 PHYS/QHP OP CAR RHAB W/ECG: CPT

## 2018-09-18 ENCOUNTER — OFFICE VISIT (OUTPATIENT)
Dept: CARDIOLOGY CLINIC | Age: 68
End: 2018-09-18

## 2018-09-18 ENCOUNTER — CLINICAL SUPPORT (OUTPATIENT)
Dept: CARDIOLOGY CLINIC | Age: 68
End: 2018-09-18

## 2018-09-18 VITALS
OXYGEN SATURATION: 96 % | BODY MASS INDEX: 34.71 KG/M2 | HEART RATE: 63 BPM | DIASTOLIC BLOOD PRESSURE: 82 MMHG | SYSTOLIC BLOOD PRESSURE: 120 MMHG | WEIGHT: 229 LBS | HEIGHT: 68 IN | RESPIRATION RATE: 16 BRPM

## 2018-09-18 DIAGNOSIS — I47.20 VT (VENTRICULAR TACHYCARDIA): ICD-10-CM

## 2018-09-18 DIAGNOSIS — I42.9 CARDIOMYOPATHY, UNSPECIFIED TYPE (HCC): ICD-10-CM

## 2018-09-18 DIAGNOSIS — Z95.810 BIVENTRICULAR ICD (IMPLANTABLE CARDIOVERTER-DEFIBRILLATOR) IN PLACE: ICD-10-CM

## 2018-09-18 DIAGNOSIS — I25.10 CORONARY ARTERY DISEASE INVOLVING NATIVE CORONARY ARTERY OF NATIVE HEART WITHOUT ANGINA PECTORIS: ICD-10-CM

## 2018-09-18 DIAGNOSIS — I48.91 ATRIAL FIBRILLATION, UNSPECIFIED TYPE (HCC): Primary | ICD-10-CM

## 2018-09-18 DIAGNOSIS — Z95.810 PRESENCE OF AUTOMATIC CARDIOVERTER/DEFIBRILLATOR (AICD): Primary | ICD-10-CM

## 2018-09-18 DIAGNOSIS — I50.32 CHRONIC DIASTOLIC HF (HEART FAILURE) (HCC): ICD-10-CM

## 2018-09-18 NOTE — PROGRESS NOTES
Chief Complaint   Patient presents with    Irregular Heart Beat     3 month follow up     1. Have you been to the ER, urgent care clinic since your last visit? Hospitalized since your last visit? Yes When: 8/9/2018 [facility]    2. Have you seen or consulted any other health care providers outside of the [facility] since your last visit? Include any pap smears or colon screening.  No

## 2018-09-18 NOTE — MR AVS SNAPSHOT
Morelia Avilez Kass[address] [facility] 
[phone] Patient: Lori Penaloza MRN:  [id number] Visit Information Date & Time Provider Department Dept. Phone Encounter #  
 9/18/2018  9:00 AM Zhen Smith, [address] [facility] [phone] [id number] Your Appointments 12/18/2018  8:15 AM  
PROCEDURE with PACEMAKER, [facility] [address] [place] [address]) Appt Note: BSC BiV ICD 3mo check, no landline [id number] [facility]  
[phone] [address] [facility]  
  
    
 3/13/2019  9:00 AM  
ESTABLISHED PATIENT with Dave Velasco MD  
[facility] [address] [place] [address]) Appt Note: $0CP 8/21/18ksr / 6 month follow up [id number] [facility]  
[phone] [address] [facility]  
  
    
 9/19/2019  2:15 PM  
ESTABLISHED PATIENT with Morgan Larios NP [facility] [address] [place] [address]) Appt Note: 1yr f/up  ekr [id number] [facility]  
[phone] [address] [facility] Upcoming Health Maintenance Date Due  
 GLAUCOMA SCREENING Q2Y 1/15/2017 COLONOSCOPY 5/14/2017 Influenza Age 5 to Adult 8/1/2018 Pneumococcal 65+ Low/Medium Risk (2 of 2 - PPSV23) 8/15/2019 MEDICARE YEARLY EXAM 8/16/2019 DTaP/Tdap/Td series (2 - Td) 7/16/2025 Allergies as of 9/18/2018  Review Complete On: 9/18/2018 By: Carmelo Carrasco LPN No Known Allergies Current Immunizations  Reviewed on 8/29/2018 Name Date Influenza Vaccine 10/2/2017 Pneumococcal Conjugate (PCV-13) 8/15/2018 Tdap 7/16/2015 Varicella Virus Vaccine 1/1/2013 Not reviewed this visit You Were Diagnosed With   
  
 Codes Comments Atrial fibrillation, unspecified type ([facility]    -  Primary ICD-10-CM: I48.91 
ICD-9-CM: 427.31 Vitals BP Pulse Resp Height(growth percentile) Weight(growth percentile) SpO2  
 120/82 (BP 1 Location: Left arm, BP Patient Position: Sitting) 63 16 5' 8\" (1.727 m) 229 lb (103.9 kg) 96% BMI Smoking Status 34.82 kg/m2 Never Smoker Vitals History BMI and BSA Data Body Mass Index Body Surface Area 34.82 kg/m 2 2.23 m 2 Preferred Pharmacy Pharmacy Name Phone [address], [address] [phone] Your Updated Medication List  
  
   
This list is accurate as of 9/18/18  9:26 AM.  Always use your most recent med list.  
  
  
  
  
 apixaban 5 mg tablet Commonly known as:  Margarita Can Take 1 Tab by mouth two (2) times a day. captopril 12.5 mg tablet Commonly known as:  CAPOTEN Take 1 Tab by mouth two (2) times a day. carvedilol 25 mg tablet Commonly known as:  Cleatis Darin Take 1 Tab by mouth two (2) times daily (with meals). CENTRUM SILVER Tab tablet Generic drug:  multivitamins-minerals-lutein Take  by mouth. clopidogrel 75 mg Tab Commonly known as:  PLAVIX Take 1 Tab by mouth daily. CRESTOR 20 mg tablet Generic drug:  rosuvastatin TAKE 1 TABLET BY MOUTH EVERY DAY  
  
 cyanocobalamin 1,000 mcg tablet Take 1,000 mcg by mouth daily. ezetimibe 10 mg tablet Commonly known as:  Charlann  TAKE 1 TABLET BY MOUTH EVERY DAY  
  
 furosemide 20 mg tablet Commonly known as:  LASIX Take 1 Tab by mouth daily. LEVITRA 20 mg tablet Generic drug:  vardenafil Take 20 mg by mouth as needed. MUCINEX DM 60-1,200 mg Tb12 Generic drug:  dextromethorphan-guaiFENesin Take  by mouth two (2) times a day. nitroglycerin 400 mcg/spray spray Commonly known as:  NITROLINGUAL  
1 [place] by SubLINGual route every five (5) minutes as needed. omega-3-dha-epa-dpa-fish oil 1,050-1,200 mg Cap Take 1 Cap by mouth two (2) times a day. OTHER  
  
 tamsulosin 0.4 mg capsule Commonly known as:  FLOMAX TAKE 1 CAPSULE BY MOUTH EVERY DAY  
  
 VITAMIN D3 1,000 unit tablet Generic drug:  cholecalciferol Take  by mouth daily. We Performed the Following AMB POC EKG ROUTINE W/ 12 LEADS, INTER & REP [01494 CPT(R)] To-Do List   
 09/21/2018 3:30 PM  
  Appointment with [facility] CARDIOPULM EXERCISE at [facility] ([phone])  
  
 09/24/2018 3:30 PM  
  Appointment with [facility] CARDIOPULM EXERCISE at [facility] ([phone])  
  
 09/28/2018 3:30 PM  
  Appointment with MRM [address] at [facility] ([phone])  
  
 10/01/2018 3:30 PM  
  Appointment with MRM [address] at [facility] ([phone]) 10/05/2018 3:30 PM  
  Appointment with [facility] CARDIOPULM EXERCISE at [facility] ([phone]) 10/08/2018 3:30 PM  
  Appointment with [facility] CARDIOPULM EXERCISE at [facility] ([phone])  
  
 10/12/2018 3:30 PM  
  Appointment with [facility] CARDIOPULM EXERCISE at [facility] ([phone])  
  
 10/15/2018 3:30 PM  
  Appointment with MRM [address] at [facility] ([phone])  
  
 10/19/2018 3:30 PM  
  Appointment with MRM [address] at [facility] ([phone])  
  
 10/22/2018 3:30 PM  
  Appointment with MRM [address] at [facility] ([phone])  
  
 10/26/2018 3:30 PM  
  Appointment with MRM [address] at [facility] ([phone])  
  
 10/29/2018 3:30 PM  
  Appointment with MRM [address] at [facility] ([phone])  
  
 11/02/2018 3:30 PM  
  Appointment with MRM [address] at [facility] ([phone])  
  
 11/05/2018 3:30 PM  
  Appointment with [facility] [facility] ([phone]) 11/09/2018 3:30 PM  
  Appointment with [facility] CARDIOPULM EXERCISE at [facility] ([phone])  
  
 11/12/2018 3:30 PM  
  Appointment with [facility] CARDIOPULM EXERCISE at [facility] ([phone])  
  
 11/16/2018 3:30 PM  
  Appointment with MRM [address] at [facility] ([phone])  
  
 11/19/2018 3:30 PM  
  Appointment with MRM [address] at [facility] ([phone])  
  
 11/26/2018 3:30 PM  
  Appointment with MRM [address] at [facility] ([phone])  
  
 11/30/2018 3:30 PM  
  Appointment with MRM [address] at [facility] ([phone])  
  
 12/03/2018 3:30 PM  
  Appointment with MRM [address] at [facility] ([phone])  
  
 12/07/2018 3:30 PM  
  Appointment with MRM [address] at [facility] ([phone])  
  
 12/10/2018 3:30 PM  
  Appointment with MRM [address] at [facility] ([phone])  
  
 12/14/2018 3:30 PM  
  Appointment with MRM [address] at [facility] ([phone])  
  
 12/17/2018 3:30 PM  
  Appointment with MRM [address] at [facility] ([phone])  
  
 12/21/2018 3:30 PM  
  Appointment with MRM [address] at [facility] ([phone]) Introducing [facility] [place] [facility]! [facility] introduces [employer] patient portal. Now you can access parts of your medical record, email your doctor's office, and request medication refills online. 1. In your internet browser, go to [email or web address] 2. Click on the First Time User? Click Here link in the Sign In box. You will see the New Member Sign Up page. 3. Enter your [employer] Access Code exactly as it appears below. You will not need to use this code after youve completed the sign-up process. If you do not sign up before the expiration date, you must request a new code. · [email or web address] Access Code: [email or web address] Expires: 12/16/2018  2:59 PM 
 
4. Enter the last four digits of your Social Security Number (xxxx) and Date of Birth (mm/dd/yyyy) as indicated and click Submit. You will be taken to the next sign-up page. 5. Create a [email or web address] ID. This will be your [email or web address] login ID and cannot be changed, so think of one that is secure and easy to remember. 6. Create a [email or web address] password. You can change your password at any time. 7. Enter your Password Reset Question and Answer. This can be used at a later time if you forget your password. 8. Enter your e-mail address. You will receive e-mail notification when new information is available in [address] 9. Click Sign Up. You can now view and download portions of your medical record. 10. Click the Download Summary menu link to download a portable copy of your medical information. If you have questions, please visit the Frequently Asked Questions section of the [email or web address] website. Remember, [email or web address] is NOT to be used for urgent needs. For medical emergencies, dial 911. Now available from your iPhone and Android! Please provide this summary of care documentation to your next provider. Your primary care clinician is listed as Eileen Aguirre. If you have any questions after today's visit, please call [phone].

## 2018-09-18 NOTE — PROGRESS NOTES
Subjective:      Mehran Walker is a 70 y.o. male is here for BIV ICD follow up. He is doing well. The patient denies chest pain/ shortness of breath, orthopnea, PND, LE edema, palpitations, syncope, presyncope or fatigue. Patient Active Problem List    Diagnosis Date Noted    [facility]) 08/10/2018    Presence of biventricular AICD 08/10/2018    Abnormal stress ECG 08/10/2018    VT (ventricular tachycardia) ([facility]) 08/10/2018    Chronic diastolic HF (heart failure) ([address] 89/72/3167    Systolic CHF, chronic ([address] 08/10/2018    S/P coronary artery stent placement 08/10/2018    VF (ventricular fibrillation) ([address] 08/10/2018    Severe obesity (BMI 35.0-39. 9) with comorbidity ([address] 04/19/2018    Low back pain 07/02/2013    Automatic implantable cardioverter-defibrillator in situ 06/11/2012    GERD (gastroesophageal reflux disease) 05/14/2012    Hematochezia 05/14/2012    Screen for colon cancer 05/14/2012    CAD (coronary artery disease) 02/16/2011    Mixed hyperlipidemia 02/16/2011    Cardiomyopathy ([address] 02/16/2011    Esophageal reflux 02/16/2011    Encounter for long-term (current) use of other medications 02/16/2011    Nocturia 02/16/2011    Abnormal PSA 02/16/2011    Essential hypertension, benign 02/16/2011      Levon Bowles MD  Past Medical History:   Diagnosis Date    [facility]) 8/10/2018    Abnormal PSA 2/16/2011    Abnormal stress ECG 8/10/2018    CAD (coronary artery disease) 2/16/2011    Cardiomyopathy ([address] 2/16/2011    Chronic diastolic HF (heart failure) ([address] 8/10/2018    Encounter for long-term (current) use of other medications 2/16/2011    Esophageal reflux 2/16/2011    Essential hypertension, benign 2/16/2011    GERD (gastroesophageal reflux disease) 5/14/2012    Heart attack ([address]     Low back pain 7/2/2013    Mixed hyperlipidemia 2/16/2011    Nocturia 2/16/2011    Pacemaker     Presence of biventricular AICD 8/10/2018    [employer] BIVICD implanted 5/2017    S/P coronary artery stent placement 8/10/2018    8/9/18 PCI/SUREKHA to LAD, RCA x 2    Systolic CHF, chronic ([facility] 8/10/2018    VF (ventricular fibrillation) ([facility] 8/10/2018    VT (ventricular tachycardia) ([facility] 8/10/2018      Past Surgical History:   Procedure Laterality Date    HX CERVICAL FUSION  1997    c4,5,6    [place] COLONOSCOPY FLX DX W/COLLJ SPEC WHEN PFRMD  5/14/2012         [place] EGD TRANSORAL BIOPSY SINGLE/MULTIPLE  5/14/2012         REMOVAL GALLBLADDER       No Known Allergies   Family History   Problem Relation Age of Onset    Lung Disease Mother     Alcohol abuse Father     Heart Disease Father     negative for cardiac disease  Social History     Social History    Marital status:      Spouse name: N/A    Number of children: N/A    Years of education: N/A     Social History Main Topics    Smoking status: Never Smoker    Smokeless tobacco: Never Used    Alcohol use No    Drug use: No    Sexual activity: Not Asked     Other Topics Concern    None     Social History Narrative     Current Outpatient Prescriptions   Medication Sig    carvedilol (COREG) 25 mg tablet Take 1 Tab by mouth two (2) times daily (with meals).  clopidogrel (PLAVIX) 75 mg tab Take 1 Tab by mouth daily.  apixaban (ELIQUIS) 5 mg tablet Take 1 Tab by mouth two (2) times a day.  furosemide (LASIX) 20 mg tablet Take 1 Tab by mouth daily.  tamsulosin (FLOMAX) 0.4 mg capsule TAKE 1 CAPSULE BY MOUTH EVERY DAY    OTHER     ezetimibe (ZETIA) 10 mg tablet TAKE 1 TABLET BY MOUTH EVERY DAY    LEVITRA 20 mg tablet Take 20 mg by mouth as needed.  captopril (CAPOTEN) 12.5 mg tablet Take 1 Tab by mouth two (2) times a day. (Patient taking differently: Take 12.5 mg by mouth two (2) times a day.  Indications: HALF TAB BID)    CRESTOR 20 mg tablet TAKE 1 TABLET BY MOUTH EVERY DAY (Patient taking differently: HALF TAB DAILY)    nitroglycerin (NITROLINGUAL) 400 mcg/spray spray 1 Spray by SubLINGual route every five (5) minutes as needed.  cyanocobalamin 1,000 mcg tablet Take 1,000 mcg by mouth daily.  omega-3-dha-epa-dpa-fish oil 1,050-1,200 mg cap Take 1 Cap by mouth two (2) times a day.  cholecalciferol, vitamin d3, (VITAMIN D) 1,000 unit tablet Take  by mouth daily.  multivitamins-minerals-lutein (CENTRUM SILVER) Tab Take  by mouth.  dextromethorphan-guaiFENesin (MUCINEX DM) 60-1,200 mg Tb12 Take  by mouth two (2) times a day. No current facility-administered medications for this visit. Vitals:    09/18/18 0853   BP: 120/82   Pulse: 63   Resp: 16   SpO2: 96%   Weight: 229 lb (103.9 kg)   Height: 5' 8\" (1.727 m)       I have reviewed the nurses notes, vitals, problem list, allergy list, medical history, family, social history and medications. Review of Symptoms:    General: Pt denies excessive weight gain or loss. Pt is able to conduct ADL's  HEENT: Denies blurred vision, headaches, epistaxis and difficulty swallowing. Respiratory: Denies shortness of breath, WING, wheezing or stridor. Cardiovascular: Denies precordial pain, palpitations, edema or PND  Gastrointestinal: Denies poor appetite, indigestion, abdominal pain or blood in stool  Urinary: Denies dysuria, pyuria  Musculoskeletal: Denies pain or swelling from muscles or joints  Neurologic: Denies tremor, paresthesias, or sensory motor disturbance  Skin: Denies rash, itching or texture change. Psych: Denies depression      Physical Exam:      General: Well developed, in no acute distress. HEENT: Eyes - PERRL, no jvd  Heart:  Normal S1/S2 negative S3 or S4. Regular, no murmur, gallop or rub.   Respiratory: Clear bilaterally x 4, no wheezing or rales  Abdomen:   Soft, non-tender, bowel sounds are active.   Extremities:  No edema, normal cap refill, no cyanosis. Musculoskeletal: No clubbing  Neuro: A&Ox3, speech clear, gait stable. Skin: Skin color is normal. No rashes or lesions.  Non diaphoretic  Vascular: 2+ pulses symmetric in all extremities    Cardiographics    Ekg: dual chamber pacing    Results for orders placed or performed during the hospital encounter of 08/09/18   EKG, 12 LEAD, INITIAL   Result Value Ref Range    Ventricular Rate 60 BPM    Atrial Rate 60 BPM    P-R Interval 214 ms    QRS Duration 124 ms    Q-T Interval 464 ms    QTC Calculation (Bezet) 464 ms    Calculated R Axis -79 degrees    Calculated T Axis -74 degrees    Diagnosis       Ventricular paced rhythm    Confirmed by Donna Robin, P.VCitlali ([id number]) on 8/10/2018 7:56:49 AM           Lab Results   Component Value Date/Time    WBC 6.9 08/10/2018 04:41 AM    HGB 13.9 08/10/2018 04:41 AM    HCT 40.1 08/10/2018 04:41 AM    PLATELET 599 (L) 11/13/7727 04:41 AM    MCV 93.7 08/10/2018 04:41 AM      Lab Results   Component Value Date/Time    Sodium 142 08/15/2018 02:53 PM    Potassium 4.0 08/15/2018 02:53 PM    Chloride 108 (H) 08/15/2018 02:53 PM    CO2 20 08/15/2018 02:53 PM    Anion gap 7 08/10/2018 04:41 AM    Glucose 117 (H) 08/15/2018 02:53 PM    BUN 14 08/15/2018 02:53 PM    Creatinine 1.25 08/15/2018 02:53 PM    BUN/Creatinine ratio 11 08/15/2018 02:53 PM    GFR est AA 67 08/15/2018 02:53 PM    GFR est non-AA 58 (L) 08/15/2018 02:53 PM    Calcium 9.5 08/15/2018 02:53 PM    Bilirubin, total 0.8 08/15/2018 02:53 PM    AST (SGOT) 23 08/15/2018 02:53 PM    Alk. phosphatase 43 08/15/2018 02:53 PM    Protein, total 6.3 08/15/2018 02:53 PM    Albumin 4.3 08/15/2018 02:53 PM    Globulin 2.3 07/29/2010 02:23 PM    A-G Ratio 2.2 08/15/2018 02:53 PM    ALT (SGPT) 27 08/15/2018 02:53 PM         Assessment:     Assessment:        ICD-10-CM ICD-9-CM    1. Atrial fibrillation, unspecified type ([address] I48.91 427.31 AMB POC EKG ROUTINE W/ 12 LEADS, INTER & REP   2. VT (ventricular tachycardia) (H[facility]) I47.2 427.1    3. Coronary artery disease involving native coronary artery of native heart without angina pectoris I25.10 414.01    4.  Cardiomyopathy, unspecified type ([facility] I42.9 425.4    5. Biventricular ICD (implantable cardioverter-defibrillator) in place Z95.810 V45.02      Orders Placed This Encounter    AMB POC EKG ROUTINE W/ 12 LEADS, INTER & REP     Order Specific Question:   Reason for Exam:     Answer:   routine        Plan:   Mr. Leah Carpenter is here for device follow up. He is doing well and is s/p cardiac cath with 3 SUREKHA to LAD and RCA since his last visit. He reports significant improvement in his symptoms and denies cardiac complaints. EKG shows dual chamber pacing. Device interrogation demonstrates 93% AP and 94% BIV pacing without further AF. He is tolerating Eliquis and Plavix therapy. Previous EF in 2017 was 55%. Will continue per device clinic and follow up in one year. Continue medical management for CAD, AF. Thank you for allowing me to participate in [facility] 's care.     Idalia Enriquez NP

## 2018-09-21 ENCOUNTER — HOSPITAL ENCOUNTER (OUTPATIENT)
Dept: CARDIAC REHAB | Age: 68
Discharge: HOME | End: 2018-09-21
Payer: MEDICARE

## 2018-09-21 VITALS — WEIGHT: 228 LBS | BODY MASS INDEX: 34.67 KG/M2

## 2018-09-21 PROCEDURE — 93798 PHYS/QHP OP CAR RHAB W/ECG: CPT

## 2018-09-24 ENCOUNTER — HOSPITAL ENCOUNTER (OUTPATIENT)
Dept: CARDIAC REHAB | Age: 68
Discharge: HOME | End: 2018-09-24
Payer: MEDICARE

## 2018-09-24 VITALS — BODY MASS INDEX: 34.52 KG/M2 | WEIGHT: 227 LBS

## 2018-09-24 PROCEDURE — 93798 PHYS/QHP OP CAR RHAB W/ECG: CPT

## 2018-09-28 ENCOUNTER — HOSPITAL ENCOUNTER (OUTPATIENT)
Dept: CARDIAC REHAB | Age: 68
Discharge: HOME | End: 2018-09-28
Payer: MEDICARE

## 2018-09-28 VITALS — BODY MASS INDEX: 34.36 KG/M2 | WEIGHT: 226 LBS

## 2018-09-28 PROCEDURE — 93798 PHYS/QHP OP CAR RHAB W/ECG: CPT

## 2018-10-01 ENCOUNTER — HOSPITAL ENCOUNTER (OUTPATIENT)
Dept: CARDIAC REHAB | Age: 68
Discharge: HOME | End: 2018-10-01
Payer: MEDICARE

## 2018-10-01 VITALS — BODY MASS INDEX: 34.21 KG/M2 | WEIGHT: 225 LBS

## 2018-10-01 PROCEDURE — 93798 PHYS/QHP OP CAR RHAB W/ECG: CPT

## 2018-10-05 ENCOUNTER — HOSPITAL ENCOUNTER (OUTPATIENT)
Dept: CARDIAC REHAB | Age: 68
Discharge: HOME | End: 2018-10-05
Payer: MEDICARE

## 2018-10-05 VITALS — BODY MASS INDEX: 34.52 KG/M2 | WEIGHT: 227 LBS

## 2018-10-05 PROCEDURE — 93798 PHYS/QHP OP CAR RHAB W/ECG: CPT

## 2018-10-08 ENCOUNTER — HOSPITAL ENCOUNTER (OUTPATIENT)
Dept: CARDIAC REHAB | Age: 68
Discharge: HOME | End: 2018-10-08
Payer: MEDICARE

## 2018-10-08 VITALS — BODY MASS INDEX: 34.67 KG/M2 | WEIGHT: 228 LBS

## 2018-10-08 PROCEDURE — 93798 PHYS/QHP OP CAR RHAB W/ECG: CPT

## 2018-10-09 RX ORDER — EZETIMIBE 10 MG/1
TABLET ORAL
Qty: 90 TAB | Refills: 1 | Status: SHIPPED | OUTPATIENT
Start: 2018-10-09 | End: 2019-02-13 | Stop reason: SDUPTHER

## 2018-10-12 ENCOUNTER — TELEPHONE (OUTPATIENT)
Dept: CARDIAC REHAB | Age: 68
End: 2018-10-12

## 2018-10-12 ENCOUNTER — HOSPITAL ENCOUNTER (OUTPATIENT)
Dept: CARDIAC REHAB | Age: 68
End: 2018-10-12
Payer: MEDICARE
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
## 2018-10-15 ENCOUNTER — HOSPITAL ENCOUNTER (OUTPATIENT)
Dept: CARDIAC REHAB | Age: 68
Discharge: HOME | End: 2018-10-15
Payer: MEDICARE

## 2018-10-15 VITALS — WEIGHT: 227 LBS | BODY MASS INDEX: 34.52 KG/M2

## 2018-10-15 PROCEDURE — 93798 PHYS/QHP OP CAR RHAB W/ECG: CPT

## 2018-10-19 ENCOUNTER — HOSPITAL ENCOUNTER (OUTPATIENT)
Dept: CARDIAC REHAB | Age: 68
Discharge: HOME | End: 2018-10-19
Payer: MEDICARE

## 2018-10-19 VITALS — WEIGHT: 230 LBS | BODY MASS INDEX: 34.97 KG/M2

## 2018-10-19 PROCEDURE — 93798 PHYS/QHP OP CAR RHAB W/ECG: CPT

## 2018-10-23 ENCOUNTER — HOSPITAL ENCOUNTER (OUTPATIENT)
Dept: CARDIAC REHAB | Age: 68
Discharge: HOME | End: 2018-10-23
Payer: MEDICARE

## 2018-10-23 VITALS — WEIGHT: 229 LBS | BODY MASS INDEX: 34.82 KG/M2

## 2018-10-23 PROCEDURE — 93798 PHYS/QHP OP CAR RHAB W/ECG: CPT

## 2018-10-25 ENCOUNTER — HOSPITAL ENCOUNTER (OUTPATIENT)
Dept: CARDIAC REHAB | Age: 68
Discharge: HOME | End: 2018-10-25
Payer: MEDICARE

## 2018-10-25 VITALS — WEIGHT: 227 LBS | BODY MASS INDEX: 34.52 KG/M2

## 2018-10-25 PROCEDURE — 93798 PHYS/QHP OP CAR RHAB W/ECG: CPT

## 2018-10-31 ENCOUNTER — HOSPITAL ENCOUNTER (OUTPATIENT)
Dept: CARDIAC REHAB | Age: 68
Discharge: HOME | End: 2018-10-31
Payer: MEDICARE

## 2018-10-31 VITALS — WEIGHT: 228 LBS | BODY MASS INDEX: 34.67 KG/M2

## 2018-10-31 PROCEDURE — 93798 PHYS/QHP OP CAR RHAB W/ECG: CPT

## 2018-11-12 DIAGNOSIS — I42.9 CARDIOMYOPATHY, UNSPECIFIED TYPE (HCC): ICD-10-CM

## 2018-11-12 RX ORDER — TAMSULOSIN HYDROCHLORIDE 0.4 MG/1
CAPSULE ORAL
Qty: 90 CAP | Refills: 1 | Status: SHIPPED | OUTPATIENT
Start: 2018-11-12 | End: 2019-05-05 | Stop reason: SDUPTHER

## 2018-11-12 NOTE — TELEPHONE ENCOUNTER
Last Visit: 8/15  Next Appt: none  Previous Refill Encounter: 6/8-30+4    Requested Prescriptions     Pending Prescriptions Disp Refills    tamsulosin (FLOMAX) 0.4 mg capsule 90 Cap 1     Sig: TAKE 1 CAPSULE BY MOUTH EVERY DAY

## 2018-11-12 NOTE — TELEPHONE ENCOUNTER
Patient has new pharmacy, [address] [place] [address] with [facility] called patient needs coreg 25mg.  Thanks

## 2018-11-13 RX ORDER — CARVEDILOL 25 MG/1
25 TABLET ORAL 2 TIMES DAILY WITH MEALS
Qty: 60 TAB | Refills: 1 | Status: SHIPPED | OUTPATIENT
Start: 2018-11-13 | End: 2019-01-09 | Stop reason: SDUPTHER

## 2018-11-13 NOTE — TELEPHONE ENCOUNTER
Pt's wife calling regarding coreg refill for pt being sent to [facility].   Thanks, [facility] Elsie

## 2018-11-16 ENCOUNTER — TELEPHONE (OUTPATIENT)
Dept: CARDIOLOGY CLINIC | Age: 68
End: 2018-11-16

## 2018-11-16 NOTE — TELEPHONE ENCOUNTER
Please send new script to [facility] for Eliquis. Patient has switched pharmacy and bottle states no refills.     Thanks  Keri Rios ext [phone]

## 2018-12-07 ENCOUNTER — TELEPHONE (OUTPATIENT)
Dept: CARDIAC REHAB | Age: 68
End: 2018-12-07

## 2018-12-07 NOTE — TELEPHONE ENCOUNTER
Pt has not attended outpatient cardiac rehab since 10/31/18. Called patient who stated he has been caring for his wife who had LLL lung cancer resection early in November. Pt is now working but continues to care for his wife who he states is a polio survivor and will have a longer recovery. Pt states he will probably return the first of the year but, if not, he will call us to be discharged.

## 2018-12-10 RX ORDER — FUROSEMIDE 20 MG/1
TABLET ORAL
Qty: 30 TAB | Refills: 0 | Status: SHIPPED | OUTPATIENT
Start: 2018-12-10 | End: 2019-01-09 | Stop reason: SDUPTHER

## 2018-12-14 RX ORDER — CLOPIDOGREL BISULFATE 75 MG/1
75 TABLET ORAL DAILY
Qty: 30 TAB | Refills: 3 | Status: SHIPPED | OUTPATIENT
Start: 2018-12-14 | End: 2019-05-05 | Stop reason: SDUPTHER

## 2018-12-17 ENCOUNTER — APPOINTMENT (OUTPATIENT)
Dept: CARDIAC REHAB | Age: 68
End: 2018-12-17

## 2018-12-18 ENCOUNTER — CLINICAL SUPPORT (OUTPATIENT)
Dept: CARDIOLOGY CLINIC | Age: 68
End: 2018-12-18

## 2018-12-18 DIAGNOSIS — Z95.810 PRESENCE OF AUTOMATIC CARDIOVERTER/DEFIBRILLATOR (AICD): Primary | ICD-10-CM

## 2018-12-18 DIAGNOSIS — I42.9 CARDIOMYOPATHY, UNSPECIFIED TYPE (HCC): ICD-10-CM

## 2019-01-09 DIAGNOSIS — I42.9 CARDIOMYOPATHY, UNSPECIFIED TYPE (HCC): ICD-10-CM

## 2019-01-10 RX ORDER — CARVEDILOL 25 MG/1
TABLET ORAL
Qty: 60 TAB | Refills: 1 | Status: SHIPPED | OUTPATIENT
Start: 2019-01-10 | End: 2019-03-11 | Stop reason: SDUPTHER

## 2019-02-06 ENCOUNTER — OFFICE VISIT (OUTPATIENT)
Dept: FAMILY MEDICINE CLINIC | Age: 69
End: 2019-02-06

## 2019-02-06 VITALS
WEIGHT: 233 LBS | HEIGHT: 68 IN | RESPIRATION RATE: 18 BRPM | SYSTOLIC BLOOD PRESSURE: 108 MMHG | DIASTOLIC BLOOD PRESSURE: 68 MMHG | OXYGEN SATURATION: 96 % | HEART RATE: 60 BPM | TEMPERATURE: 97.5 F | BODY MASS INDEX: 35.31 KG/M2

## 2019-02-06 DIAGNOSIS — H10.9 CONJUNCTIVITIS OF BOTH EYES, UNSPECIFIED CONJUNCTIVITIS TYPE: Primary | ICD-10-CM

## 2019-02-06 RX ORDER — AMOXICILLIN AND CLAVULANATE POTASSIUM 875; 125 MG/1; MG/1
1 TABLET, FILM COATED ORAL EVERY 12 HOURS
Qty: 20 TAB | Refills: 0 | Status: SHIPPED | OUTPATIENT
Start: 2019-02-06 | End: 2019-04-17 | Stop reason: SDUPTHER

## 2019-02-06 RX ORDER — SULFACETAMIDE SODIUM 100 MG/ML
1 SOLUTION/ DROPS OPHTHALMIC
Qty: 1 BOTTLE | Refills: 0 | Status: SHIPPED | OUTPATIENT
Start: 2019-02-06 | End: 2019-04-17 | Stop reason: SDUPTHER

## 2019-02-06 NOTE — PROGRESS NOTES
HISTORY OF PRESENT ILLNESS Tashi Baeza is a 70 y.o. male. resolving sinusitis sx,bilater conjunctivitis x 5 days with discharge and lid edema Pink Eye The history is provided by the patient. This is a new problem. The current episode started more than 1 week ago. The problem occurs daily. The problem has been gradually worsening. The injury mechanism was none. The pain is at a severity of 3/10. The pain is moderate. There is no known exposure to pink eye. Associated symptoms include discharge, photophobia, eye redness and pain. Pertinent negatives include no fever. Sinus Infection The history is provided by the patient. This is a chronic problem. The current episode started more than 1 week ago. The problem has been gradually improving. There has been no fever. The pain is moderate. Associated symptoms include sweats, congestion, sinus pressure, sore throat, cough and rhinorrhea. Pertinent negatives include no chills. Review of Systems Constitutional: Negative for chills, fever and malaise/fatigue. HENT: Positive for congestion, rhinorrhea, sinus pressure and sore throat. Eyes: Positive for photophobia, pain, discharge and redness. Respiratory: Positive for cough. Physical Exam  
Constitutional: He appears well-developed and well-nourished. HENT:  
Head: Normocephalic and atraumatic. Nose: Mucosal edema and rhinorrhea present. Mouth/Throat: Posterior oropharyngeal erythema present. Eyes: EOM are normal. Pupils are equal, round, and reactive to light. Right eye exhibits discharge. Left eye exhibits discharge. Moderate purulent bilateral conjuntivitis Neck: Normal range of motion. Neck supple. Pulmonary/Chest: Effort normal and breath sounds normal.  
Lymphadenopathy:  
  He has no cervical adenopathy. ASSESSMENT and PLAN Diagnoses and all orders for this visit: 1. Conjunctivitis of both eyes, unspecified conjunctivitis type -     amoxicillin-clavulanate (AUGMENTIN) 875-125 mg per tablet; Take 1 Tab by mouth every twelve (12) hours for 10 days. -     sulfacetamide (BLEPH-10) 10 % ophthalmic solution; Administer 1 Drop to both eyes every three (3) hours for 10 days. warm compresses,improved eye hygeine Follow-up Disposition: Not on File

## 2019-02-12 ENCOUNTER — TELEPHONE (OUTPATIENT)
Dept: CARDIOLOGY CLINIC | Age: 69
End: 2019-02-12

## 2019-02-12 NOTE — TELEPHONE ENCOUNTER
Previous pharmacy has closed. [id number] is calling requesting we fax them a request for Zetia and they will take over filling it for patient. Thanks!

## 2019-02-14 RX ORDER — EZETIMIBE 10 MG/1
TABLET ORAL
Qty: 90 TAB | Refills: 0 | Status: SHIPPED | OUTPATIENT
Start: 2019-02-14 | End: 2019-07-30 | Stop reason: SDUPTHER

## 2019-02-26 ENCOUNTER — OFFICE VISIT (OUTPATIENT)
Dept: FAMILY MEDICINE CLINIC | Age: 69
End: 2019-02-26

## 2019-02-26 ENCOUNTER — HOSPITAL ENCOUNTER (OUTPATIENT)
Dept: LAB | Age: 69
Discharge: HOME | End: 2019-02-26
Payer: MEDICARE

## 2019-02-26 VITALS
BODY MASS INDEX: 34.92 KG/M2 | RESPIRATION RATE: 20 BRPM | SYSTOLIC BLOOD PRESSURE: 112 MMHG | HEART RATE: 60 BPM | TEMPERATURE: 97.4 F | DIASTOLIC BLOOD PRESSURE: 78 MMHG | HEIGHT: 68 IN | OXYGEN SATURATION: 97 % | WEIGHT: 230.4 LBS

## 2019-02-26 DIAGNOSIS — I10 ESSENTIAL HYPERTENSION, BENIGN: Primary | ICD-10-CM

## 2019-02-26 DIAGNOSIS — R35.1 NOCTURIA: ICD-10-CM

## 2019-02-26 DIAGNOSIS — Z95.5 S/P CORONARY ARTERY STENT PLACEMENT: ICD-10-CM

## 2019-02-26 DIAGNOSIS — R82.71 ASYMPTOMATIC BACTERIURIA: ICD-10-CM

## 2019-02-26 DIAGNOSIS — I50.32 CHRONIC DIASTOLIC HF (HEART FAILURE) (HCC): ICD-10-CM

## 2019-02-26 DIAGNOSIS — E78.2 MIXED HYPERLIPIDEMIA: ICD-10-CM

## 2019-02-26 DIAGNOSIS — I42.9 CARDIOMYOPATHY, UNSPECIFIED TYPE (HCC): ICD-10-CM

## 2019-02-26 LAB
BILIRUB UR QL STRIP: NEGATIVE
GLUCOSE UR-MCNC: NEGATIVE MG/DL
KETONES P FAST UR STRIP-MCNC: NEGATIVE MG/DL
PH UR STRIP: 6.5 [PH] (ref 4.6–8)
PROT UR QL STRIP: NEGATIVE
SP GR UR STRIP: 1.01 (ref 1–1.03)
UA UROBILINOGEN AMB POC: NORMAL (ref 0.2–1)
URINALYSIS CLARITY POC: CLEAR
URINALYSIS COLOR POC: YELLOW
URINE BLOOD POC: NORMAL
URINE LEUKOCYTES POC: NORMAL
URINE NITRITES POC: NEGATIVE

## 2019-02-26 PROCEDURE — 84153 ASSAY OF PSA TOTAL: CPT

## 2019-02-26 PROCEDURE — 85025 COMPLETE CBC W/AUTO DIFF WBC: CPT

## 2019-02-26 PROCEDURE — 87088 URINE BACTERIA CULTURE: CPT

## 2019-02-26 PROCEDURE — 80053 COMPREHEN METABOLIC PANEL: CPT

## 2019-02-26 PROCEDURE — 80061 LIPID PANEL: CPT

## 2019-02-26 PROCEDURE — 36415 COLL VENOUS BLD VENIPUNCTURE: CPT

## 2019-02-26 PROCEDURE — 87186 SC STD MICRODIL/AGAR DIL: CPT

## 2019-02-26 PROCEDURE — 87086 URINE CULTURE/COLONY COUNT: CPT

## 2019-02-26 NOTE — PROGRESS NOTES
Subjective:  
 
Eduardo Sosa is a 78 y.o. male presenting for annual exam and complete physical. 
 
Patient Active Problem List  
Diagnosis Code  CAD (coronary artery disease) I25.10  Mixed hyperlipidemia E78.2  Cardiomyopathy ([address] I42.9  Esophageal reflux K21.9  
 Encounter for long-term (current) use of other medications Z79.899  
 Nocturia R35.1  Abnormal PSA R97.20  Essential hypertension, benign I10  
 GERD (gastroesophageal reflux disease) K21.9  Hematochezia K92.1  Screen for colon cancer Z12.11  
 Automatic implantable cardioverter-defibrillator in situ Z95.810  Low back pain M54.5  Severe obesity (BMI 35.0-39. 9) with comorbidity ([facility]) E66.01  
 A-fib ([facility] I48.91  
 Presence of biventricular AICD Z95.810  
 Abnormal stress ECG R94.39  
 VT (ventricular tachycardia) ([facility]) I47.2  Chronic diastolic HF (heart failure) ([facility]) I50.32  Systolic CHF, chronic ([facility]) I50.22  S/P coronary artery stent placement Z95.5  VF (ventricular fibrillation) ([facility]) I49.01 Patient Active Problem List  
 Diagnosis Date Noted  A-fib ([address] 08/10/2018  Presence of biventricular AICD 08/10/2018  Abnormal stress ECG 08/10/2018  VT (ventricular tachycardia) ([address] 08/10/2018  Chronic diastolic HF (heart failure) ([address] 08/10/2018  Systolic CHF, chronic ([address] 08/10/2018  S/P coronary artery stent placement 08/10/2018  VF (ventricular fibrillation) ([address] 08/10/2018  Severe obesity (BMI 35.0-39. 9) with comorbidity ([address] 04/19/2018  Low back pain 07/02/2013  Automatic implantable cardioverter-defibrillator in situ 06/11/2012  GERD (gastroesophageal reflux disease) 05/14/2012  Hematochezia 05/14/2012  Screen for colon cancer 05/14/2012  CAD (coronary artery disease) 02/16/2011  Mixed hyperlipidemia 02/16/2011  Cardiomyopathy ([address] 02/16/2011  Esophageal reflux 02/16/2011  Encounter for long-term (current) use of other medications 02/16/2011  Nocturia 02/16/2011  Abnormal PSA 02/16/2011  Essential hypertension, benign 02/16/2011 Current Outpatient Medications Medication Sig Dispense Refill  ezetimibe (ZETIA) 10 mg tablet TAKE 1 TABLET BY MOUTH EVERY DAY 90 Tab 0  
 furosemide (LASIX) 20 mg tablet TAKE 1 TABLET EVERY DAY 30 Tab 6  carvedilol (COREG) 25 mg tablet TAKE 1 TABLET BY MOUTH TWO (2) TIMES DAILY (WITH MEALS). 60 Tab 1  clopidogrel (PLAVIX) 75 mg tab Take 1 Tab by mouth daily. 30 Tab 3  
 apixaban (ELIQUIS) 5 mg tablet Take 1 Tab by mouth two (2) times a day. 60 Tab 3  
 tamsulosin (FLOMAX) 0.4 mg capsule TAKE 1 CAPSULE BY MOUTH EVERY DAY 90 Cap 1  
 OTHER     
 LEVITRA 20 mg tablet Take 20 mg by mouth as needed. 99  
 captopril (CAPOTEN) 12.5 mg tablet Take 1 Tab by mouth two (2) times a day. (Patient taking differently: Take 12.5 mg by mouth two (2) times a day. Indications: HALF TAB BID) 180 Tab 3  
 CRESTOR 20 mg tablet TAKE 1 TABLET BY MOUTH EVERY DAY (Patient taking differently: HALF TAB DAILY) 90 Tab 2  cyanocobalamin 1,000 mcg tablet Take 1,000 mcg by mouth daily.  omega-3-dha-epa-dpa-fish oil 1,050-1,200 mg cap Take 1 Cap by mouth two (2) times a day.  multivitamins-minerals-lutein (CENTRUM SILVER) Tab Take  by mouth.  dextromethorphan-guaiFENesin (MUCINEX DM) 60-1,200 mg Tb12 Take  by mouth two (2) times a day.  nitroglycerin (NITROLINGUAL) 400 mcg/spray spray 1 Spray by SubLINGual route every five (5) minutes as needed. 1 Bottle 1  cholecalciferol, vitamin d3, (VITAMIN D) 1,000 unit tablet Take  by mouth daily. No Known Allergies Past Medical History:  
Diagnosis Date  A-fib ([facility] 8/10/2018  Abnormal PSA 2/16/2011  Abnormal stress ECG 8/10/2018  CAD (coronary artery disease) 2/16/2011  Cardiomyopathy ([facility] 2/16/2011  Chronic diastolic HF (heart failure) ([facility] 8/10/2018  Encounter for long-term (current) use of other medications 2/16/2011  Esophageal reflux 2/16/2011  Essential hypertension, benign 2/16/2011  GERD (gastroesophageal reflux disease) 5/14/2012  [facility] ([facility]  Low back pain 7/2/2013  Mixed hyperlipidemia 2/16/2011  Nocturia 2/16/2011  Pacemaker  Presence of biventricular AICD 8/10/2018 [employer] implanted 5/2017  S/P coronary artery stent placement 8/10/2018  
 8/9/18 PCI/SUREKHA to LAD, RCA x 2  
 Systolic CHF, chronic ([facility] 8/10/2018  VF (ventricular fibrillation) ([facility] 8/10/2018  VT (ventricular tachycardia) ([facility] 8/10/2018 Past Surgical History:  
Procedure Laterality Date  HX CERVICAL FUSION  1997  
 c4,5,6  
 HX PACEMAKER    
 [place] COLONOSCOPY FLX DX W/COLLJ SPEC WHEN PFRMD  5/14/2012  [place] EGD TRANSORAL BIOPSY SINGLE/MULTIPLE  5/14/2012  REMOVAL GALLBLADDER Family History Problem Relation Age of Onset  Lung Disease Mother  Cancer Mother  Alcohol abuse Father  Heart Disease Father Social History Tobacco Use  Smoking status: Never Smoker  Smokeless tobacco: Never Used Substance Use Topics  Alcohol use: No  
  
 
  
 
Review of Systems Constitutional: negative Eyes: negative Ears, nose, mouth, throat, and face: negative Respiratory: negative Cardiovascular: negative Gastrointestinal: negative Genitourinary:negative Integument/breast: negative Hematologic/lymphatic: negative Musculoskeletal:negative Neurological: negative Behavioral/Psych: negative Objective:  
 
Visit Vitals /78 (BP 1 Location: Left arm, BP Patient Position: Sitting) Pulse 60 Temp 97.4 °F (36.3 °C) (Oral) Resp 20 Ht 5' 8\" (1.727 m) Wt 230 lb 6.4 oz (104.5 kg) SpO2 97% BMI 35.03 kg/m² Physical exam:  
General appearance - alert, well appearing, and in no distress Mental status - alert, oriented to person, place, and time Eyes - pupils equal and reactive, extraocular eye movements intact Ears - bilateral TM's and external ear canals normal 
Nose - normal and patent, no erythema, discharge or polyps Mouth - mucous membranes moist, pharynx normal without lesions Neck - supple, no significant adenopathy Chest - clear to auscultation, no wheezes, rales or rhonchi, symmetric air entry Heart - normal rate, regular rhythm, normal S1, S2, no murmurs, rubs, clicks or gallops Abdomen - soft, nontender, nondistended, no masses or organomegaly Rectal - negative without mass, lesions or tenderness Musculoskeletal - no joint tenderness, deformity or swelling Extremities - peripheral pulses normal, no pedal edema, no clubbing or cyanosis Skin - normal coloration and turgor, no rashes, no suspicious skin lesions noted Assessment/Plan:  
 
Well Male 
continue present plan, routine labs ordered, call if any problems. Diagnoses and all orders for this visit: 1. Essential hypertension, benign,controlled -     METABOLIC PANEL, COMPREHENSIVE 
-     CBC WITH AUTOMATED DIFF 
-     AMB POC URINALYSIS DIP STICK AUTO W/O MICRO 2. Cardiomyopathy, unspecified type ([address] 3. S/P coronary artery stent placement 4. Chronic diastolic HF (heart failure) ([address] 5. Mixed hyperlipidemia -     LIPID PANEL 6. Nocturia -     PSA, DIAGNOSTIC (PROSTATE SPECIFIC AG) 7. Asymptomatic bacteriuria 
-     CULTURE, URINE Follow-up Disposition: 
Return in about 6 months (around 8/26/2019). Deonte Dupree

## 2019-02-26 NOTE — PROGRESS NOTES
Chief Complaint Patient presents with  Well Male Pt getting annual physical.  
1. Have you been to the ER, urgent care clinic since your last visit? Hospitalized since your last visit? No 
 
2. Have you seen or consulted any other health care providers outside of the [address] [place] [address] since your last visit? Include any pap smears or colon screening.  No

## 2019-02-27 LAB
ALBUMIN SERPL-MCNC: 4.6 G/DL (ref 3.5–4.8)
ALBUMIN/GLOB SERPL: 2.6 {RATIO} (ref 1.2–2.2)
ALP SERPL-CCNC: 40 IU/L (ref 39–117)
ALT SERPL-CCNC: 26 IU/L (ref 0–44)
AST SERPL-CCNC: 27 IU/L (ref 0–40)
BASOPHILS # BLD AUTO: 0 X10E3/UL (ref 0–0.2)
BASOPHILS NFR BLD AUTO: 0 %
BILIRUB SERPL-MCNC: 1.2 MG/DL (ref 0–1.2)
BUN SERPL-MCNC: 16 MG/DL (ref 8–27)
BUN/CREAT SERPL: 12 (ref 10–24)
CALCIUM SERPL-MCNC: 9.6 MG/DL (ref 8.6–10.2)
CHLORIDE SERPL-SCNC: 106 MMOL/L (ref 96–106)
CHOLEST SERPL-MCNC: 138 MG/DL (ref 100–199)
CO2 SERPL-SCNC: 20 MMOL/L (ref 20–29)
CREAT SERPL-MCNC: 1.29 MG/DL (ref 0.76–1.27)
EOSINOPHIL # BLD AUTO: 0.2 X10E3/UL (ref 0–0.4)
EOSINOPHIL NFR BLD AUTO: 4 %
ERYTHROCYTE [DISTWIDTH] IN BLOOD BY AUTOMATED COUNT: 13.7 % (ref 12.3–15.4)
GLOBULIN SER CALC-MCNC: 1.8 G/DL (ref 1.5–4.5)
GLUCOSE SERPL-MCNC: 94 MG/DL (ref 65–99)
HCT VFR BLD AUTO: 41.7 % (ref 37.5–51)
HDLC SERPL-MCNC: 52 MG/DL
HGB BLD-MCNC: 14.3 G/DL (ref 13–17.7)
IMM GRANULOCYTES # BLD AUTO: 0 X10E3/UL (ref 0–0.1)
IMM GRANULOCYTES NFR BLD AUTO: 0 %
INTERPRETATION, 910389: NORMAL
INTERPRETATION: NORMAL
LDLC SERPL CALC-MCNC: 68 MG/DL (ref 0–99)
LYMPHOCYTES # BLD AUTO: 1.3 X10E3/UL (ref 0.7–3.1)
LYMPHOCYTES NFR BLD AUTO: 23 %
MCH RBC QN AUTO: 31.4 PG (ref 26.6–33)
MCHC RBC AUTO-ENTMCNC: 34.3 G/DL (ref 31.5–35.7)
MCV RBC AUTO: 92 FL (ref 79–97)
MONOCYTES # BLD AUTO: 0.4 X10E3/UL (ref 0.1–0.9)
MONOCYTES NFR BLD AUTO: 7 %
NEUTROPHILS # BLD AUTO: 3.7 X10E3/UL (ref 1.4–7)
NEUTROPHILS NFR BLD AUTO: 66 %
PDF IMAGE, 910387: NORMAL
PLATELET # BLD AUTO: 127 X10E3/UL (ref 150–379)
POTASSIUM SERPL-SCNC: 4.3 MMOL/L (ref 3.5–5.2)
PROT SERPL-MCNC: 6.4 G/DL (ref 6–8.5)
PSA SERPL-MCNC: 3.9 NG/ML (ref 0–4)
RBC # BLD AUTO: 4.55 X10E6/UL (ref 4.14–5.8)
SODIUM SERPL-SCNC: 145 MMOL/L (ref 134–144)
TRIGL SERPL-MCNC: 92 MG/DL (ref 0–149)
VLDLC SERPL CALC-MCNC: 18 MG/DL (ref 5–40)
WBC # BLD AUTO: 5.7 X10E3/UL (ref 3.4–10.8)

## 2019-02-28 LAB — BACTERIA UR CULT: ABNORMAL

## 2019-03-04 DIAGNOSIS — N39.0 URINARY TRACT INFECTION WITHOUT HEMATURIA, SITE UNSPECIFIED: Primary | ICD-10-CM

## 2019-03-04 RX ORDER — AMOXICILLIN 500 MG/1
500 CAPSULE ORAL 3 TIMES DAILY
Qty: 30 CAP | Refills: 0 | Status: SHIPPED | OUTPATIENT
Start: 2019-03-04 | End: 2019-03-14

## 2019-03-11 DIAGNOSIS — I42.9 CARDIOMYOPATHY, UNSPECIFIED TYPE (HCC): ICD-10-CM

## 2019-03-13 ENCOUNTER — OFFICE VISIT (OUTPATIENT)
Dept: CARDIOLOGY CLINIC | Age: 69
End: 2019-03-13

## 2019-03-13 VITALS
RESPIRATION RATE: 18 BRPM | DIASTOLIC BLOOD PRESSURE: 80 MMHG | WEIGHT: 230.9 LBS | HEIGHT: 68 IN | SYSTOLIC BLOOD PRESSURE: 120 MMHG | HEART RATE: 60 BPM | BODY MASS INDEX: 34.99 KG/M2

## 2019-03-13 DIAGNOSIS — I10 ESSENTIAL HYPERTENSION, BENIGN: ICD-10-CM

## 2019-03-13 DIAGNOSIS — I48.0 PAROXYSMAL ATRIAL FIBRILLATION (HCC): ICD-10-CM

## 2019-03-13 DIAGNOSIS — Z95.810 PRESENCE OF BIVENTRICULAR AICD: ICD-10-CM

## 2019-03-13 DIAGNOSIS — E78.2 MIXED HYPERLIPIDEMIA: ICD-10-CM

## 2019-03-13 DIAGNOSIS — I25.10 CORONARY ARTERY DISEASE INVOLVING NATIVE CORONARY ARTERY OF NATIVE HEART WITHOUT ANGINA PECTORIS: Primary | ICD-10-CM

## 2019-03-13 DIAGNOSIS — I25.5 ISCHEMIC CARDIOMYOPATHY: ICD-10-CM

## 2019-03-13 RX ORDER — CARVEDILOL 25 MG/1
TABLET ORAL
Qty: 60 TAB | Refills: 1 | Status: SHIPPED | OUTPATIENT
Start: 2019-03-13 | End: 2019-05-07 | Stop reason: SDUPTHER

## 2019-03-13 NOTE — PROGRESS NOTES
Subjective/HPI:     Mr. Tammie Booker is a 78 y.o. male is here for routine f/u. He has a PMHx of CAD, ischemic cardiomyopathy now resolved s/p BiV ICD, PAF, HTN and HLD.          PCP Provider  Cassidy Franco MD  Past Medical History:   Diagnosis Date    A-fib [facility]) 8/10/2018    Abnormal PSA 2/16/2011    Abnormal stress ECG 8/10/2018    CAD (coronary artery disease) 2/16/2011    Cardiomyopathy ([facility] 2/16/2011    Chronic diastolic HF (heart failure) ([facility] 8/10/2018    Encounter for long-term (current) use of other medications 2/16/2011    Esophageal reflux 2/16/2011    Essential hypertension, benign 2/16/2011    GERD (gastroesophageal reflux disease) 5/14/2012    Heart attack ([facility]     Low back pain 7/2/2013    Mixed hyperlipidemia 2/16/2011    Nocturia 2/16/2011    Pacemaker     Presence of biventricular AICD 8/10/2018    [employer] BIVICD implanted 5/2017    S/P coronary artery stent placement 8/10/2018    8/9/18 PCI/SUREKHA to LAD, RCA x 2    Systolic CHF, chronic (HCC) 8/10/2018    VF (ventricular fibrillation) ([facility] 8/10/2018    VT (ventricular tachycardia) ([facility] 8/10/2018      Past Surgical History:   Procedure Laterality Date    HX CERVICAL FUSION  1997    c4,5,6    HX PACEMAKER      HI COLONOSCOPY FLX DX W/COLLJ SPEC WHEN PFRMD  5/14/2012         HI EGD TRANSORAL BIOPSY SINGLE/MULTIPLE  5/14/2012         REMOVAL GALLBLADDER       Family History   Problem Relation Age of Onset    Lung Disease Mother     Cancer Mother     Alcohol abuse Father     Heart Disease Father      Social History     Socioeconomic History    Marital status:      Spouse name: Not on file    Number of children: Not on file    Years of education: Not on file    Highest education level: Not on file   Social Needs    Financial resource strain: Not on file    Food insecurity - worry: Not on file    Food insecurity - inability: Not on file    Transportation needs - medical: Not on file  Transportation needs - non-medical: Not on file   Occupational History    Not on file   Tobacco Use    Smoking status: Never Smoker    Smokeless tobacco: Never Used   Substance and Sexual Activity    Alcohol use: No    Drug use: No    Sexual activity: Not on file   Other Topics Concern    Not on file   Social History Narrative    Not on file       No Known Allergies     Current Outpatient Medications   Medication Sig    amoxicillin (AMOXIL) 500 mg capsule Take 1 Cap by mouth three (3) times daily for 10 days.  ezetimibe (ZETIA) 10 mg tablet TAKE 1 TABLET BY MOUTH EVERY DAY    furosemide (LASIX) 20 mg tablet TAKE 1 TABLET EVERY DAY    clopidogrel (PLAVIX) 75 mg tab Take 1 Tab by mouth daily.  apixaban (ELIQUIS) 5 mg tablet Take 1 Tab by mouth two (2) times a day.  tamsulosin (FLOMAX) 0.4 mg capsule TAKE 1 CAPSULE BY MOUTH EVERY DAY    dextromethorphan-guaiFENesin (MUCINEX DM) 60-1,200 mg Tb12 Take  by mouth two (2) times a day.  OTHER     LEVITRA 20 mg tablet Take 20 mg by mouth as needed.  captopril (CAPOTEN) 12.5 mg tablet Take 1 Tab by mouth two (2) times a day. (Patient taking differently: Take 12.5 mg by mouth two (2) times a day. Indications: HALF TAB BID)    CRESTOR 20 mg tablet TAKE 1 TABLET BY MOUTH EVERY DAY (Patient taking differently: HALF TAB DAILY)    nitroglycerin (NITROLINGUAL) 400 mcg/spray spray 1 Spray by SubLINGual route every five (5) minutes as needed.  cyanocobalamin 1,000 mcg tablet Take 1,000 mcg by mouth daily.  omega-3-dha-epa-dpa-fish oil 1,050-1,200 mg cap Take 1 Cap by mouth two (2) times a day.  cholecalciferol, vitamin d3, (VITAMIN D) 1,000 unit tablet Take  by mouth daily.  multivitamins-minerals-lutein (CENTRUM SILVER) Tab Take 1 Tab by mouth daily.  carvedilol (COREG) 25 mg tablet TAKE 1 TABLET BY MOUTH TWO (2) TIMES DAILY (WITH MEALS). No current facility-administered medications for this visit.          I have reviewed the problem list, allergy list, medical history, family, social history and medications. Review of Symptoms:    Review of Systems   Constitutional: Negative for chills, fever and weight loss. HENT: Negative for nosebleeds. Eyes: Negative for blurred vision and double vision. Respiratory: Negative for cough, shortness of breath and wheezing. Cardiovascular: Negative for chest pain, palpitations, orthopnea, leg swelling and PND. Gastrointestinal: Negative for abdominal pain, blood in stool, diarrhea, nausea and vomiting. Musculoskeletal: Negative for joint pain. Skin: Negative for rash. Neurological: Negative for dizziness, tingling and loss of consciousness. Endo/Heme/Allergies: Does not bruise/bleed easily. Physical Exam:      General: Well developed, in no acute distress, cooperative and alert  HEENT: No carotid bruits, no JVD, trach is midline. Neck Supple, PEERL, EOM intact. Heart:  reg rate and rhythm; normal S1/S2; no murmurs, gallops or rubs.   Respiratory: Clear bilaterally x 4, no wheezing or rales  Abdomen:   Soft, non-tender, no distention, no masses. + BS.   Extremities:  Normal cap refill, no cyanosis, atraumatic. No edema. Neuro: A&Ox3, speech clear, gait stable. Skin: Skin color is normal. No rashes or lesions.  Non diaphoretic  Vascular: 2+ pulses symmetric in all extremities    Vitals:    03/13/19 0856 03/13/19 0906   BP: 118/78 120/80   Pulse: 60    Resp: 18    Weight: 230 lb 14.4 oz (104.7 kg)    Height: 5' 8\" (1.727 m)        Cardiographics    ECG: AV paced rhythm  Results for orders placed or performed during the hospital encounter of 08/09/18   EKG, 12 LEAD, INITIAL   Result Value Ref Range    Ventricular Rate 60 BPM    Atrial Rate 60 BPM    P-R Interval 214 ms    QRS Duration 124 ms    Q-T Interval 464 ms    QTC Calculation (Bezet) 464 ms    Calculated R Axis -79 degrees    Calculated T Axis -74 degrees    Diagnosis       Ventricular paced rhythm    Confirmed by Desiree Guadarramachangy, P.V. ([id number]) on 8/10/2018 7:56:49 AM         Cardiology Labs:  Lab Results   Component Value Date/Time    Cholesterol, total 138 02/26/2019 02:24 PM    HDL Cholesterol 52 02/26/2019 02:24 PM    LDL, calculated 68 02/26/2019 02:24 PM    Triglyceride 92 02/26/2019 02:24 PM       Lab Results   Component Value Date/Time    Sodium 145 (H) 02/26/2019 02:24 PM    Potassium 4.3 02/26/2019 02:24 PM    Chloride 106 02/26/2019 02:24 PM    CO2 20 02/26/2019 02:24 PM    Anion gap 7 08/10/2018 04:41 AM    Glucose 94 02/26/2019 02:24 PM    BUN 16 02/26/2019 02:24 PM    Creatinine 1.29 (H) 02/26/2019 02:24 PM    BUN/Creatinine ratio 12 02/26/2019 02:24 PM    GFR est AA 65 02/26/2019 02:24 PM    GFR est non-AA 56 (L) 02/26/2019 02:24 PM    Calcium 9.6 02/26/2019 02:24 PM    Bilirubin, total 1.2 02/26/2019 02:24 PM    AST (SGOT) 27 02/26/2019 02:24 PM    Alk. phosphatase 40 02/26/2019 02:24 PM    Protein, total 6.4 02/26/2019 02:24 PM    Albumin 4.6 02/26/2019 02:24 PM    Globulin 2.3 07/29/2010 02:23 PM    A-G Ratio 2.6 (H) 02/26/2019 02:24 PM    ALT (SGPT) 26 02/26/2019 02:24 PM           Assessment:     Assessment:       ICD-10-CM ICD-9-CM    1. Coronary artery disease involving native coronary artery of native heart without angina pectoris I25.10 414.01 AMB POC EKG ROUTINE W/ 12 LEADS, INTER & REP   2. Ischemic cardiomyopathy I25.5 414.8    3. Paroxysmal atrial fibrillation (HCC) I48.0 427.31    4. Essential hypertension, benign I10 401.1    5. Mixed hyperlipidemia E78.2 272.2    6. Presence of biventricular AICD Z95.810 V45.02         Plan:     1. Coronary artery disease involving native coronary artery of native heart without angina pectoris  S/p SUREKHA to distal RCA and mid LAD in 8/2018. Echo in 9/2017 with preserved LVEF 50-55% with grade 1 DD, mildly dilated LA and mild MR. Doing well without anginal or anginal equivalent symptoms. Continue Plavix and Eliquis therapy, along with coreg and rosuvastatin.   - AMB POC EKG ROUTINE W/ 12 LEADS, INTER & REP    2. Ischemic cardiomyopathy  Resolved; echo in 9/2017 with preserved LVEF with grade 1 DD; s/p BiV ICD. Compensated cardiomyopathy on exam today; continue BB, ACEi & Lasix. 3.  Paroxysmal atrial fibrillation (HCC)  No A fib noted on device interrogation; rates controlled; maintaining sinus rhythm. Continue Eliquis therapy. 4. Essential hypertension, benign  BP controlled; continue anti-hypertensives and low sodium diet. 5. Mixed hyperlipidemia  LDL 68 in 2/2019; continue statin therapy and low fat, low cholesterol diet.     6. Presence of biventricular AICD  Continue with routine device interrogation with Dr. Russel Fritz, FNP-BC  Cassie Vieyra NP

## 2019-03-13 NOTE — PROGRESS NOTES
1. Have you been to the ER, urgent care clinic since your last visit? Hospitalized since your last visit? No    2. Have you seen or consulted any other health care providers outside of the [address] [place] [address] since your last visit? Include any pap smears or colon screening. No    Chief Complaint   Patient presents with    CHF     6 mo appt. Denied cardiac symptoms.

## 2019-03-26 ENCOUNTER — CLINICAL SUPPORT (OUTPATIENT)
Dept: CARDIOLOGY CLINIC | Age: 69
End: 2019-03-26

## 2019-03-26 DIAGNOSIS — I47.20 VT (VENTRICULAR TACHYCARDIA): ICD-10-CM

## 2019-03-26 DIAGNOSIS — I50.32 CHRONIC DIASTOLIC HF (HEART FAILURE) (HCC): ICD-10-CM

## 2019-03-26 DIAGNOSIS — Z95.810 PRESENCE OF AUTOMATIC CARDIOVERTER/DEFIBRILLATOR (AICD): Primary | ICD-10-CM

## 2019-04-17 DIAGNOSIS — H10.9 CONJUNCTIVITIS OF BOTH EYES, UNSPECIFIED CONJUNCTIVITIS TYPE: ICD-10-CM

## 2019-04-17 RX ORDER — SULFACETAMIDE SODIUM 100 MG/ML
1 SOLUTION/ DROPS OPHTHALMIC
Qty: 15 ML | Refills: 0 | Status: SHIPPED | OUTPATIENT
Start: 2019-04-17 | End: 2019-04-27

## 2019-04-17 RX ORDER — AMOXICILLIN AND CLAVULANATE POTASSIUM 875; 125 MG/1; MG/1
TABLET, FILM COATED ORAL
Qty: 20 TAB | Refills: 0 | Status: SHIPPED | OUTPATIENT
Start: 2019-04-17 | End: 2019-05-14 | Stop reason: ALTCHOICE

## 2019-05-05 RX ORDER — TAMSULOSIN HYDROCHLORIDE 0.4 MG/1
CAPSULE ORAL
Qty: 90 CAP | Refills: 1 | Status: SHIPPED | OUTPATIENT
Start: 2019-05-05 | End: 2019-10-28 | Stop reason: SDUPTHER

## 2019-05-06 ENCOUNTER — HOSPITAL ENCOUNTER (EMERGENCY)
Age: 69
Discharge: HOME | End: 2019-05-06
Attending: EMERGENCY MEDICINE | Admitting: EMERGENCY MEDICINE
Payer: MEDICARE

## 2019-05-06 VITALS
HEART RATE: 60 BPM | RESPIRATION RATE: 25 BRPM | BODY MASS INDEX: 35.28 KG/M2 | WEIGHT: 232.81 LBS | OXYGEN SATURATION: 97 % | SYSTOLIC BLOOD PRESSURE: 121 MMHG | DIASTOLIC BLOOD PRESSURE: 80 MMHG | HEIGHT: 68 IN | TEMPERATURE: 97.7 F

## 2019-05-06 DIAGNOSIS — M62.830 BACK MUSCLE SPASM: Primary | ICD-10-CM

## 2019-05-06 LAB
ATRIAL RATE: 64 BPM
CALCULATED P AXIS, ECG09: 16 DEGREES
CALCULATED R AXIS, ECG10: -92 DEGREES
CALCULATED T AXIS, ECG11: -19 DEGREES
DIAGNOSIS, 93000: NORMAL
P-R INTERVAL, ECG05: 204 MS
Q-T INTERVAL, ECG07: 466 MS
QRS DURATION, ECG06: 146 MS
QTC CALCULATION (BEZET), ECG08: 480 MS
VENTRICULAR RATE, ECG03: 64 BPM

## 2019-05-06 PROCEDURE — 93005 ELECTROCARDIOGRAM TRACING: CPT

## 2019-05-06 PROCEDURE — 96372 THER/PROPH/DIAG INJ SC/IM: CPT

## 2019-05-06 PROCEDURE — 74011250637 HC RX REV CODE- 250/637: Performed by: EMERGENCY MEDICINE

## 2019-05-06 PROCEDURE — 99283 EMERGENCY DEPT VISIT LOW MDM: CPT

## 2019-05-06 PROCEDURE — 74011250636 HC RX REV CODE- 250/636: Performed by: EMERGENCY MEDICINE

## 2019-05-06 RX ORDER — ACETAMINOPHEN 500 MG
1000 TABLET ORAL ONCE
Status: COMPLETED | OUTPATIENT
Start: 2019-05-06 | End: 2019-05-06

## 2019-05-06 RX ORDER — HYDROCODONE BITARTRATE AND ACETAMINOPHEN 5; 325 MG/1; MG/1
1 TABLET ORAL
Qty: 6 TAB | Refills: 0 | Status: SHIPPED | OUTPATIENT
Start: 2019-05-06 | End: 2019-05-09

## 2019-05-06 RX ORDER — CLOPIDOGREL BISULFATE 75 MG/1
TABLET ORAL
Qty: 30 TAB | Refills: 3 | Status: SHIPPED | OUTPATIENT
Start: 2019-05-06 | End: 2019-09-09 | Stop reason: SDUPTHER

## 2019-05-06 RX ORDER — DEXAMETHASONE SODIUM PHOSPHATE 4 MG/ML
10 INJECTION, SOLUTION INTRA-ARTICULAR; INTRALESIONAL; INTRAMUSCULAR; INTRAVENOUS; SOFT TISSUE
Status: COMPLETED | OUTPATIENT
Start: 2019-05-06 | End: 2019-05-06

## 2019-05-06 RX ADMIN — ACETAMINOPHEN 1000 MG: 500 TABLET ORAL at 06:46

## 2019-05-06 RX ADMIN — DEXAMETHASONE SODIUM PHOSPHATE 10 MG: 4 INJECTION, SOLUTION INTRAMUSCULAR; INTRAVENOUS at 06:46

## 2019-05-06 NOTE — LETTER
[facility] EMERGENCY DEPT 
[address] [place] [address] [address] [phone] 
[phone] Work/School Note Date: 5/6/2019 To Whom It May concern: Olga Yates was seen and treated today in the emergency room by the following provider(s): 
Attending Provider: Stevenson Nelson MD. Olga Yates may return to work on Tuesday 5/7/19. Sincerely, 
 
 
 
 
Jj Go.  MD Ariella

## 2019-05-06 NOTE — ED PROVIDER NOTES
EMERGENCY DEPARTMENT HISTORY AND PHYSICAL EXAM 
     
 
Date: 5/6/2019 Patient Name: Lilian Cardozo History of Presenting Illness Chief Complaint Patient presents with  Arm Pain Right arm pain that he woke up with this morning. 10/10 History Provided By: Patient HPI: Lilian Cardozo is a 78 y.o. male, pmhx afib, CAD, CM with dHF and HTN, who presents ambulatory to the ED c/o back pain. Started with scapular back pain on the right radiating into his right arm 3 days ago for which he has used tylenol and pain patches. It worsened this AM while driving and was severe so he drove here instead. He denies any numbness, new weakness in the arm, no CP, no SOB. He notes has had similar episodes in the past which self resolved. Patient specifically denies any recent fevers, chills, nausea, vomiting, diarrhea, abd pain, CP, SOB, urinary sxs, changes in BM, or headache. PCP: Thanh Jo MD  
Cards: Ruddy Christian Allergies: NKDA Social Hx: -tobacco, -EtOH, -Illicit Drugs There are no other complaints, changes, or physical findings at this time. Current Outpatient Medications Medication Sig Dispense Refill  
 HYDROcodone-acetaminophen (NORCO) 5-325 mg per tablet Take 1 Tab by mouth every four (4) hours as needed for Pain for up to 3 days. Max Daily Amount: 6 Tabs. 6 Tab 0  
 tamsulosin (FLOMAX) 0.4 mg capsule TAKE 1 CAPSULE EVERY DAY 90 Cap 1  
 amoxicillin-clavulanate (AUGMENTIN) 875-125 mg per tablet TAKE 1 TABLET BY MOUTH EVERY TWELVE (12) HOURS FOR 10 DAYS. 20 Tab 0  carvedilol (COREG) 25 mg tablet TAKE 1 TABLET BY MOUTH TWO (2) TIMES DAILY (WITH MEALS). 60 Tab 1  
 apixaban (ELIQUIS) 5 mg tablet Take 1 Tab by mouth two (2) times a day. 60 Tab 3  
 ezetimibe (ZETIA) 10 mg tablet TAKE 1 TABLET BY MOUTH EVERY DAY 90 Tab 0  
 furosemide (LASIX) 20 mg tablet TAKE 1 TABLET EVERY DAY 30 Tab 6  clopidogrel (PLAVIX) 75 mg tab Take 1 Tab by mouth daily.  30 Tab 3  
  dextromethorphan-guaiFENesin (MUCINEX DM) 60-1,200 mg Tb12 Take  by mouth two (2) times a day.  OTHER     
 LEVITRA 20 mg tablet Take 20 mg by mouth as needed. 99  
 captopril (CAPOTEN) 12.5 mg tablet Take 1 Tab by mouth two (2) times a day. (Patient taking differently: Take 12.5 mg by mouth two (2) times a day. Indications: HALF TAB BID) 180 Tab 3  
 CRESTOR 20 mg tablet TAKE 1 TABLET BY MOUTH EVERY DAY (Patient taking differently: HALF TAB DAILY) 90 Tab 2  
 nitroglycerin (NITROLINGUAL) 400 mcg/spray spray 1 Spray by SubLINGual route every five (5) minutes as needed. 1 Bottle 1  cyanocobalamin 1,000 mcg tablet Take 1,000 mcg by mouth daily.  omega-3-dha-epa-dpa-fish oil 1,050-1,200 mg cap Take 1 Cap by mouth two (2) times a day.  cholecalciferol, vitamin d3, (VITAMIN D) 1,000 unit tablet Take  by mouth daily.  multivitamins-minerals-lutein (CENTRUM SILVER) Tab Take 1 Tab by mouth daily. Past History Past Medical History: 
Past Medical History:  
Diagnosis Date  A-fib ([facility] 8/10/2018  Abnormal PSA 2/16/2011  Abnormal stress ECG 8/10/2018  CAD (coronary artery disease) 2/16/2011  Cardiomyopathy ([address] 2/16/2011  Chronic diastolic HF (heart failure) ([address] 8/10/2018  Encounter for long-term (current) use of other medications 2/16/2011  Esophageal reflux 2/16/2011  Essential hypertension, benign 2/16/2011  GERD (gastroesophageal reflux disease) 5/14/2012  Heart attack ([address]  Low back pain 7/2/2013  Mixed hyperlipidemia 2/16/2011  Nocturia 2/16/2011  Pacemaker  Presence of biventricular AICD 8/10/2018 [employer] implanted 5/2017  S/P coronary artery stent placement 8/10/2018  
 8/9/18 PCI/SUREKHA to LAD, RCA x 2  
 Systolic CHF, chronic ([address] 8/10/2018  VF (ventricular fibrillation) ([address] 8/10/2018  [facility] 8/10/2018 Past Surgical History: 
Past Surgical History: Procedure Laterality Date  HX CERVICAL FUSION  1997  
 c4,5,6  
 HX PACEMAKER    
 [place] COLONOSCOPY FLX DX W/COLLJ SPEC WHEN PFRMD  5/14/2012  [place] EGD TRANSORAL BIOPSY SINGLE/MULTIPLE  5/14/2012  REMOVAL GALLBLADDER Family History: 
Family History Problem Relation Age of Onset  Lung Disease Mother  Cancer Mother  Alcohol abuse Father  Heart Disease Father Social History: 
Social History Tobacco Use  Smoking status: Never Smoker  Smokeless tobacco: Never Used Substance Use Topics  Alcohol use: No  
 Drug use: No  
 
 
Allergies: 
No Known Allergies Review of Systems Review of Systems Constitutional: Negative for activity change, appetite change, chills, fever and unexpected weight change. HENT: Negative for congestion. Eyes: Negative for pain and visual disturbance. Respiratory: Negative for cough and shortness of breath. Cardiovascular: Negative for chest pain. Gastrointestinal: Negative for abdominal pain, diarrhea, nausea and vomiting. Genitourinary: Negative for dysuria. Musculoskeletal: Positive for back pain. Skin: Negative for rash. Neurological: Negative for headaches. Physical Exam  
Physical Exam  
Constitutional: He is oriented to person, place, and time. He appears well-developed and well-nourished. This is an elderly male currently in mild distress sitting comfortably in bed. HENT:  
Head: Normocephalic and atraumatic. Mouth/Throat: Oropharynx is clear and moist.  
Eyes: Pupils are equal, round, and reactive to light. Conjunctivae and EOM are normal. Right eye exhibits no discharge. Left eye exhibits no discharge. Neck: Normal range of motion. Neck supple. Cardiovascular: Normal rate, regular rhythm, normal heart sounds and intact distal pulses. No murmur heard. Pulmonary/Chest: Effort normal. No respiratory distress. Musculoskeletal: Normal range of motion. He exhibits no edema. Right mid and upper back with significant spasms and muscle tension around the bottom of the scapula. Neurological: He is alert and oriented to person, place, and time. No cranial nerve deficit. He exhibits normal muscle tone. Skin: Skin is warm and dry. No rash noted. He is not diaphoretic. Nursing note and vitals reviewed. Diagnostic Study Results Labs - Recent Results (from the past 12 hour(s)) EKG, 12 LEAD, INITIAL Collection Time: 05/06/19  5:29 AM  
Result Value Ref Range Ventricular Rate 64 BPM  
 Atrial Rate 64 BPM  
 P-R Interval 204 ms QRS Duration 146 ms  
 Q-T Interval 466 ms  
 QTC Calculation (Bezet) 480 ms Calculated P Axis 16 degrees Calculated R Axis -92 degrees Calculated T Axis -19 degrees Diagnosis AV dual-paced rhythm with frequent ventricular-paced complexes Biventricular pacemaker detected When compared with ECG of 10-AUG-2018 04:36, 
Previous ECG has undetermined rhythm, needs review Radiologic Studies - No orders to display CT Results  (Last 48 hours) None CXR Results  (Last 48 hours) None Medical Decision Making I am the first provider for this patient. I reviewed the vital signs, available nursing notes, past medical history, past surgical history, family history and social history. Vital Signs-Reviewed the patient's vital signs. Patient Vitals for the past 12 hrs: 
 Temp Pulse Resp BP SpO2  
05/06/19 0630  60 25 121/80 97 % 05/06/19 0526 97.7 °F (36.5 °C) 60 16 108/74 96 % Pulse Oximetry Analysis - 97% on RA Records Reviewed: Nursing Notes Provider Notes (Medical Decision Making): MDM: Elderly male presenting with symptoms consistent with radiculopathy secondary to muscle spasms and tension in the mid back. Low suspicion for ACS, PE, dissection causing his symptoms. ED Course:  
Initial assessment performed.  The patients presenting problems have been discussed, and they are in agreement with the care plan formulated and outlined with them. I have encouraged them to ask questions as they arise throughout their visit. Discharge note: 
7:20 AM 
Pt re-evaluated and noted to be feeling better compared to on arrival, ready for discharge. Will follow up as instructed with Dr. Jessica Trejo for physical therapy referral. All questions have been answered, pt voiced understanding and agreement with plan. Narcotics were prescribed, pt was advised not to drive or operate heavy machinery. Specific return precautions provided as well as instructions to return to the ED should sx worsen at any time. Vital signs stable for discharge. Critical Care Time:  
0 Diagnosis Clinical Impression: 1. Back muscle spasm PLAN: 
1. Discharge Medication List as of 5/6/2019  7:13 AM  
  
 
2. Follow-up Information Follow up With Specialties Details Why Contact Info Angelito May MD Family Practice Schedule an appointment as soon as possible for a visit for recheck and physical therapy referal  [address] [place] [address] [phone] Return to ED if worse Disposition: 
Home Please note, this dictation was completed with [employer], the [employer] voice recognition software. Quite often unanticipated grammatical, syntax, homophones, and other interpretive errors are inadvertently transcribed by the computer software. Please disregard these errors. Please excuse any errors that have escaped final proof reading.

## 2019-05-06 NOTE — ED NOTES
Pt discharged by Dr Kayce Hansen. Pt provided with discharge instructions Rx and instructions on follow up care. Pt out of ED ambulatory without difficulty.

## 2019-05-06 NOTE — ED NOTES
Pt presents to ED with right scapular pain and right arm pain. Pt states pain had been a 14/10 around 0400 this morning and while we conversed he reported 4/10. Pt states that pain is positional and can be temporarily relieved with stretching yet overall, remain persistent.

## 2019-05-06 NOTE — DISCHARGE INSTRUCTIONS
Patient Education        Back Spasm: Care Instructions  Your Care Instructions  A back spasm is sudden tightness and pain in your back muscles. It may happen from overuse or an injury. Things like sleeping in an awkward way, bending, lifting, standing, or sitting can sometimes cause a spasm. But the cause isn't always clear. Home treatment includes using heat or ice, taking over-the-counter (OTC) pain medicines, and avoiding activities that may cause back pain. For a back spasm that doesn't get better with home care, your doctor may prescribe medicine. Treatments such as massage or manipulation may also help ease a back spasm. Your doctor may also suggest exercise or physical therapy to help improve strength and flexibility in your back muscles. In most cases, getting back to your normal activities is good for your back. Just make sure to avoid doing things that make your pain worse. Follow-up care is a key part of your treatment and safety. Be sure to make and go to all appointments, and call your doctor if you are having problems. It's also a good idea to know your test results and keep a list of the medicines you take. How can you care for yourself at home?   Heat, ice, and medicines    · To relieve pain, use heat or ice (whichever feels better) on the affected area. ? Put a warm water bottle, a heating pad set on low, or a warm cloth on your back. Put a thin cloth between the heating pad and your skin. Do not go to sleep with a heating pad on your skin. ? Try ice or a cold pack on the area for 10 to 20 minutes at a time. Put a thin cloth between the ice and your skin.     · For most back pain you can take over-the-counter pain medicine. Nonsteroidal anti-inflammatory drugs (NSAIDs) such as ibuprofen or naproxen seem to work best. But if you can't take NSAIDs you can try acetaminophen. Your doctor can prescribe stronger medicines if needed. Be safe with medicines.  Read and follow all instructions on the label.    Body positions and posture    · Sit or lie in positions that are most comfortable for you and that reduce pain. Try one of these positions when you lie down:  ? Lie on your back with your knees bent and supported by large pillows. ? Lie on the floor with your legs on the seat of a sofa or chair. ? Lie on your side with your knees and hips bent and a pillow between your legs. ? Lie on your stomach if it does not make pain worse.     · Do not sit up in bed. Avoid soft couches and twisted positions.     · Avoid bed rest after the first day of back pain. Bed rest can help relieve pain at first, but it delays healing. Continued rest without activity is usually not good for your back.     · If you must sit for long periods of time, take breaks from sitting. Change positions every 30 minutes. Get up and walk around, or lie in a comfortable position. Activity    · Take short walks several times a day. You can start with 5 to 10 minutes, 3 or 4 times a day, and work up to longer walks. Walk on level surfaces and avoid hills and stairs until your back starts to feel better.     · After your back spasm starts to feel better, try to stretch your muscles every day, especially before and after exercise and at bedtime. Regular stretching can help relax your muscles.     · To prevent future back pain, do exercises to stretch and strengthen your back and stomach. Learn to use good posture, safe lifting techniques, and other ways to move to help you avoid back pain. When should you call for help? Call 911 anytime you think you may need emergency care. For example, call if:    · You are unable to move an arm or a leg at all.   [facility] your doctor now or seek immediate medical care if:    · You have new or worse symptoms in your legs, belly, or buttocks. Symptoms may include:  ? Numbness or tingling. ? Weakness.   ? Pain.     · You lose bladder or bowel control.    Watch closely for changes in your health, and be sure to contact your doctor if:    · You have a fever, lose weight, or don't feel well.     · You do not get better as expected. Where can you learn more? Go to http://leila-jamil.info/. Enter E232 in the search box to learn more about \"Back Spasm: Care Instructions. \"  Current as of: September 20, 2018  Content Version: 11.9  © 8468-9323 [employer]. Care instructions adapted under license by [employer] (which disclaims liability or warranty for this information). If you have questions about a medical condition or this instruction, always ask your healthcare professional. [place] [address] any warranty or liability for your use of this information. Patient Education        Healthy Upper Back: Exercises  Your Care Instructions  Here are some examples of exercises for your upper back. Start each exercise slowly. Ease off the exercise if you start to have pain. Your doctor or physical therapist will tell you when you can start these exercises and which ones will work best for you. How to do the exercises  Lower neck and upper back stretch    1. Stretch your arms out in front of your body. Clasp one hand on top of your other hand. 2. Gently reach out so that you feel your shoulder blades stretching away from each other. 3. Gently bend your head forward. 4. Hold for 15 to 30 seconds. 5. Repeat 2 to 4 times. Midback stretch    1. Kneel on the floor, and sit back on your ankles. 2. Lean forward, place your hands on the floor, and stretch your arms out in front of you. Rest your head between your arms. 3. Gently push your chest toward the floor, reaching as far in front of you as possible. 4. Hold for 15 to 30 seconds. 5. Repeat 2 to 4 times. Shoulder rolls    1. Sit comfortably with your feet shoulder-width apart. You can also do this exercise while standing.   2. Roll your shoulders up, then back, and then down in a smooth, circular motion. 3. Repeat 2 to 4 times. Wall push-up    1. Stand against a wall with your feet about 12 to 24 inches back from the wall. If you feel any pain when you do this exercise, stand closer to the wall. 2. Place your hands on the wall slightly wider apart than your shoulders, and lean forward. 3. Gently lean your body toward the wall. Then push back to your starting position. Keep the motion smooth and controlled. 4. Repeat 8 to 12 times. Resisted shoulder blade squeeze    1. Sit or stand, holding the band in both hands in front of you. Keep your elbows close to your sides, bent at a 90-degree angle. Your palms should face up. 2. Squeeze your shoulder blades together, and move your arms to the outside, stretching the band. Be sure to keep your elbows at your sides while you do this. 3. Relax. 4. Repeat 8 to 12 times. Resisted rows    1. Put the band around a solid object, such as a bedpost, at about waist level. Hold one end of the band in each hand. 2. With your elbows at your sides and bent to 90 degrees, pull the band back to move your shoulder blades toward each other. Return to the starting position. 3. Repeat 8 to 12 times. Follow-up care is a key part of your treatment and safety. Be sure to make and go to all appointments, and call your doctor if you are having problems. It's also a good idea to know your test results and keep a list of the medicines you take. Where can you learn more? Go to http://leila-jamil.info/. Enter O718 in the search box to learn more about \"Healthy Upper Back: Exercises. \"  Current as of: September 20, 2018  Content Version: 11.9  © 0344-8833 [employer]. Care instructions adapted under license by [employer] (which disclaims liability or warranty for this information).  If you have questions about a medical condition or this instruction, always ask your healthcare professional. Gil Uribe any warranty or liability for your use of this information.

## 2019-05-07 DIAGNOSIS — I42.9 CARDIOMYOPATHY, UNSPECIFIED TYPE (HCC): ICD-10-CM

## 2019-05-07 RX ORDER — CARVEDILOL 25 MG/1
TABLET ORAL
Qty: 60 TAB | Refills: 1 | Status: SHIPPED | OUTPATIENT
Start: 2019-05-07 | End: 2019-07-10 | Stop reason: SDUPTHER

## 2019-05-14 ENCOUNTER — OFFICE VISIT (OUTPATIENT)
Dept: FAMILY MEDICINE CLINIC | Age: 69
End: 2019-05-14

## 2019-05-14 VITALS
OXYGEN SATURATION: 98 % | HEART RATE: 60 BPM | TEMPERATURE: 98.4 F | DIASTOLIC BLOOD PRESSURE: 78 MMHG | BODY MASS INDEX: 35.77 KG/M2 | RESPIRATION RATE: 18 BRPM | HEIGHT: 68 IN | SYSTOLIC BLOOD PRESSURE: 124 MMHG | WEIGHT: 236 LBS

## 2019-05-14 DIAGNOSIS — H10.9 CONJUNCTIVITIS OF RIGHT EYE, UNSPECIFIED CONJUNCTIVITIS TYPE: Primary | ICD-10-CM

## 2019-05-14 DIAGNOSIS — S43.401D SPRAIN OF RIGHT SHOULDER, UNSPECIFIED SHOULDER SPRAIN TYPE, SUBSEQUENT ENCOUNTER: ICD-10-CM

## 2019-05-14 RX ORDER — CYCLOBENZAPRINE HCL 10 MG
10 TABLET ORAL
Qty: 30 TAB | Refills: 3 | Status: SHIPPED | OUTPATIENT
Start: 2019-05-14 | End: 2022-04-13

## 2019-05-14 RX ORDER — PREDNISONE 10 MG/1
10 TABLET ORAL 2 TIMES DAILY
Qty: 14 TAB | Refills: 0 | Status: SHIPPED | OUTPATIENT
Start: 2019-05-14 | End: 2019-07-08 | Stop reason: ALTCHOICE

## 2019-05-14 RX ORDER — APIXABAN 5 MG (74)
KIT ORAL
COMMUNITY
Start: 2019-04-16 | End: 2019-09-18 | Stop reason: SDUPTHER

## 2019-05-14 RX ORDER — GENTAMICIN SULFATE 0.3 %
0.5 OINTMENT (GRAM) OPHTHALMIC (EYE) 3 TIMES DAILY
Qty: 1 TUBE | Refills: 1 | Status: SHIPPED | OUTPATIENT
Start: 2019-05-14 | End: 2019-12-26

## 2019-05-14 NOTE — PROGRESS NOTES
HISTORY OF PRESENT ILLNESS  [place] is a 78 y.o. male. ER fu severert upper back pain,spasm,recurrent conjunctivitis rt eye  Back Pain    The history is provided by the patient. This is a recurrent problem. The current episode started more than 1 week ago. The problem has been gradually improving. The problem occurs rarely. The pain is associated with no known injury. The pain is present in the thoracic spine. The quality of the pain is described as shooting. The pain is at a severity of 7/10. Pertinent negatives include no chest pain, no fever, no headaches, no abdominal pain and no abdominal swelling. Eye Problem   The history is provided by the patient. This is a recurrent problem. The problem occurs every several days. The problem has been gradually worsening. Pertinent negatives include no chest pain, no abdominal pain and no headaches. He has tried a warm compress for the symptoms. Review of Systems   Constitutional: Negative for fever. Eyes: Positive for blurred vision, photophobia, pain, discharge and redness. Cardiovascular: Negative for chest pain. Gastrointestinal: Negative for abdominal pain. Musculoskeletal: Positive for back pain. Skin: Negative for rash. Neurological: Negative for headaches. Physical Exam   Constitutional: He is oriented to person, place, and time. He appears well-developed and well-nourished. HENT:   Head: Normocephalic and atraumatic. Right Ear: External ear normal.   Left Ear: External ear normal.   Nose: Nose normal.   Mouth/Throat: Oropharynx is clear and moist.   Eyes: Pupils are equal, round, and reactive to light. Moderate rt conjuntival inflammation and dischrge,with lid edema   Neck: Normal range of motion. Neck supple. Cardiovascular: Normal rate and regular rhythm. Musculoskeletal:        Right shoulder: He exhibits decreased range of motion and tenderness. He exhibits no spasm. Thoracic back: He exhibits tenderness.  He exhibits normal range of motion, no bony tenderness, no swelling, no edema, no deformity and no spasm. Back:    Neurological: He is alert and oriented to person, place, and time. Skin: Skin is warm and dry. ASSESSMENT and PLAN  Diagnoses and all orders for this visit:    1. Conjunctivitis of right eye, unspecified conjunctivitis type  -     gentamicin (GARAMYCIN) 0.3 % (3 mg/gram) ophthalmic ointment; Administer 0.5 Inches to right eye three (3) times daily for 10 days. 2. Sprain of right shoulder, unspecified shoulder sprain type, subsequent encounter  -     cyclobenzaprine (FLEXERIL) 10 mg tablet; Take 1 Tab by mouth three (3) times daily as needed for Muscle Spasm(s). -     predniSONE (DELTASONE) 10 mg tablet; Take 10 mg by mouth two (2) times a day. Follow-up and Dispositions    · Return in about 3 months (around 8/14/2019), or if symptoms worsen or fail to improve.

## 2019-05-14 NOTE — PROGRESS NOTES
Chief Complaint   Patient presents with    Back Pain     1. Have you been to the ER, urgent care clinic since your last visit? Hospitalized since your last visit? [facility], back/shoulder pain. 2. Have you seen or consulted any other health care providers outside of the [address] [place] [address] since your last visit? Include any pap smears or colon screening.  No

## 2019-06-27 ENCOUNTER — CLINICAL SUPPORT (OUTPATIENT)
Dept: CARDIOLOGY CLINIC | Age: 69
End: 2019-06-27

## 2019-06-27 DIAGNOSIS — Z95.810 PRESENCE OF AUTOMATIC CARDIOVERTER/DEFIBRILLATOR (AICD): Primary | ICD-10-CM

## 2019-06-27 DIAGNOSIS — I42.9 CARDIOMYOPATHY, UNSPECIFIED TYPE (HCC): ICD-10-CM

## 2019-07-08 ENCOUNTER — OFFICE VISIT (OUTPATIENT)
Dept: FAMILY MEDICINE CLINIC | Age: 69
End: 2019-07-08

## 2019-07-08 VITALS
HEART RATE: 59 BPM | WEIGHT: 240.8 LBS | HEIGHT: 68 IN | TEMPERATURE: 97.7 F | BODY MASS INDEX: 36.49 KG/M2 | RESPIRATION RATE: 20 BRPM | OXYGEN SATURATION: 96 % | DIASTOLIC BLOOD PRESSURE: 76 MMHG | SYSTOLIC BLOOD PRESSURE: 118 MMHG

## 2019-07-08 DIAGNOSIS — M79.89 RIGHT LEG SWELLING: Primary | ICD-10-CM

## 2019-07-08 DIAGNOSIS — S80.11XA CONTUSION OF MULTIPLE SITES OF RIGHT LOWER EXTREMITY, INITIAL ENCOUNTER: ICD-10-CM

## 2019-07-08 RX ORDER — AMOXICILLIN AND CLAVULANATE POTASSIUM 875; 125 MG/1; MG/1
1 TABLET, FILM COATED ORAL 2 TIMES DAILY WITH MEALS
Qty: 14 TAB | Refills: 0 | Status: SHIPPED | OUTPATIENT
Start: 2019-07-08 | End: 2019-07-15

## 2019-07-08 NOTE — PROGRESS NOTES
HISTORY OF PRESENT ILLNESS  Yaz Bledsoe is a 78 y.o. male. struck rt lateral leg on car door 4 days ago,has had massive swelling and ecchymosis distally ever since. Remains on apixaban for PAF  Leg Injury    The history is provided by the patient. This is a new problem. The current episode started more than 2 days ago. The problem has been gradually worsening. The pain is present in the right lower leg. The quality of the pain is described as aching. The pain is at a severity of 4/10. The pain is moderate. Leg Swelling   The history is provided by the patient. This is a new problem. The current episode started more than 2 days ago. The problem has been gradually worsening. Review of Systems   Constitutional: Negative for fever. Cardiovascular: Positive for leg swelling. Skin: Positive for rash. Physical Exam   Constitutional: He appears well-developed and well-nourished. HENT:   Head: Normocephalic and atraumatic. Right Ear: External ear normal.   Left Ear: External ear normal.   Nose: Nose normal.   Mouth/Throat: Oropharynx is clear and moist.   Cardiovascular: Normal rate and regular rhythm. Pulmonary/Chest: Effort normal and breath sounds normal.   Abdominal: Soft. Bowel sounds are normal.   Musculoskeletal:        Right lower leg: He exhibits tenderness, swelling and edema. Legs:  Massive swelluing leg/calf with moderate tenderness   Skin: Skin is warm and dry. Ecchymoses medial and lateral foot       ASSESSMENT and PLAN  Diagnoses and all orders for this visit:    1. Right leg swelling  -     amoxicillin-clavulanate (AUGMENTIN) 875-125 mg per tablet; Take 1 Tab by mouth two (2) times daily (with meals) for 7 days. 2. Contusion of multiple sites of right lower extremity, initial encounter,recopmmend icing,elevation ,call 32 days  -     amoxicillin-clavulanate (AUGMENTIN) 875-125 mg per tablet; Take 1 Tab by mouth two (2) times daily (with meals) for 7 days.

## 2019-07-10 DIAGNOSIS — I42.9 CARDIOMYOPATHY, UNSPECIFIED TYPE (HCC): ICD-10-CM

## 2019-07-10 RX ORDER — CARVEDILOL 25 MG/1
TABLET ORAL
Qty: 60 TAB | Refills: 1 | Status: SHIPPED | OUTPATIENT
Start: 2019-07-10 | End: 2019-09-04 | Stop reason: SDUPTHER

## 2019-07-18 ENCOUNTER — TELEPHONE (OUTPATIENT)
Dept: CARDIAC REHAB | Age: 69
End: 2019-07-18

## 2019-07-18 NOTE — TELEPHONE ENCOUNTER
Pt discharged from outpatient cardiac rehab program.  He has not attended since 10/31/18. At that time notes indicate pt was caring for his wife who had left lung resection for cancer and he had also gone back to work.

## 2019-07-23 RX ORDER — ROSUVASTATIN CALCIUM 20 MG/1
20 TABLET, COATED ORAL DAILY
Qty: 90 TAB | Refills: 2 | Status: SHIPPED | OUTPATIENT
Start: 2019-07-23 | End: 2020-10-16

## 2019-07-30 DIAGNOSIS — I48.0 PAROXYSMAL ATRIAL FIBRILLATION (HCC): ICD-10-CM

## 2019-07-30 RX ORDER — APIXABAN 5 MG/1
TABLET, FILM COATED ORAL
Qty: 60 TAB | Refills: 3 | Status: SHIPPED | OUTPATIENT
Start: 2019-07-30 | End: 2019-11-29 | Stop reason: SDUPTHER

## 2019-07-30 RX ORDER — EZETIMIBE 10 MG/1
TABLET ORAL
Qty: 90 TAB | Refills: 0 | Status: SHIPPED | OUTPATIENT
Start: 2019-07-30 | End: 2019-11-02 | Stop reason: SDUPTHER

## 2019-08-01 RX ORDER — FUROSEMIDE 20 MG/1
TABLET ORAL
Qty: 30 TAB | Refills: 6 | Status: SHIPPED | OUTPATIENT
Start: 2019-08-01 | End: 2020-03-26

## 2019-08-02 ENCOUNTER — OFFICE VISIT (OUTPATIENT)
Dept: FAMILY MEDICINE CLINIC | Age: 69
End: 2019-08-02

## 2019-08-02 VITALS
HEIGHT: 68 IN | OXYGEN SATURATION: 96 % | DIASTOLIC BLOOD PRESSURE: 76 MMHG | BODY MASS INDEX: 35.8 KG/M2 | HEART RATE: 71 BPM | TEMPERATURE: 97.7 F | RESPIRATION RATE: 20 BRPM | WEIGHT: 236.2 LBS | SYSTOLIC BLOOD PRESSURE: 122 MMHG

## 2019-08-02 DIAGNOSIS — E78.2 MIXED HYPERLIPIDEMIA: ICD-10-CM

## 2019-08-02 DIAGNOSIS — I50.32 CHRONIC DIASTOLIC HF (HEART FAILURE) (HCC): ICD-10-CM

## 2019-08-02 DIAGNOSIS — L03.115 CELLULITIS OF RIGHT LEG: Primary | ICD-10-CM

## 2019-08-02 DIAGNOSIS — Z95.5 S/P CORONARY ARTERY STENT PLACEMENT: ICD-10-CM

## 2019-08-02 DIAGNOSIS — I42.9 CARDIOMYOPATHY, UNSPECIFIED TYPE (HCC): ICD-10-CM

## 2019-08-02 DIAGNOSIS — I10 ESSENTIAL HYPERTENSION, BENIGN: ICD-10-CM

## 2019-08-02 RX ORDER — AMOXICILLIN AND CLAVULANATE POTASSIUM 875; 125 MG/1; MG/1
1 TABLET, FILM COATED ORAL EVERY 12 HOURS
Qty: 20 TAB | Refills: 0 | Status: SHIPPED | OUTPATIENT
Start: 2019-08-02 | End: 2019-12-26

## 2019-08-02 NOTE — PROGRESS NOTES
Chief Complaint   Patient presents with    Leg Injury     F/U on R leg injury. 1. Have you been to the ER, urgent care clinic since your last visit? Hospitalized since your last visit? No    2. Have you seen or consulted any other health care providers outside of the [address] [place] [address] since your last visit? Include any pap smears or colon screening.  No

## 2019-08-02 NOTE — PROGRESS NOTES
HISTORY OF PRESENT ILLNESS  Ariana Meeks is a 78 y.o. male. struck rt lateral leg on car door 4 days ago,has had massive swelling and ecchymosis distally ever since. Remains on apixaban for PAF  Leg Injury    The history is provided by the patient. This is a new problem. The current episode started more than 1 week ago. The problem has been gradually improving. The pain is present in the right lower leg. The quality of the pain is described as aching. The pain is at a severity of 2/10. The pain is moderate. Leg Swelling   The history is provided by the patient. This is a new problem. The current episode started more than 2 days ago. The problem has been gradually worsening. Pertinent negatives include no chest pain and no abdominal pain. Hypertension    The history is provided by the patient. This is a chronic problem. The problem has not changed since onset. Associated symptoms include peripheral edema. Pertinent negatives include no chest pain, no orthopnea and no palpitations. Review of Systems   Constitutional: Negative for fever. Cardiovascular: Positive for leg swelling. Negative for chest pain, palpitations and orthopnea. Gastrointestinal: Negative for abdominal pain. Skin: Positive for rash. Psychiatric/Behavioral: Negative for depression. Physical Exam   Constitutional: He appears well-developed and well-nourished. HENT:   Head: Normocephalic and atraumatic. Right Ear: External ear normal.   Left Ear: External ear normal.   Nose: Nose normal.   Mouth/Throat: Oropharynx is clear and moist.   Cardiovascular: Normal rate and regular rhythm. Pulmonary/Chest: Effort normal and breath sounds normal.   Abdominal: Soft. Bowel sounds are normal.   Musculoskeletal:        Right lower leg: He exhibits tenderness, swelling and edema. Legs:  Improved  Swelling and redness anterior rt leg with moderate tenderness   Skin: Skin is warm and dry.    Psychiatric: He has a normal mood and affect. Diagnoses and all orders for this visit:    1. Cellulitis of right leg  -     amoxicillin-clavulanate (AUGMENTIN) 875-125 mg per tablet; Take 1 Tab by mouth every twelve (12) hours for 10 days. 2. Essential hypertension, benign    3. Cardiomyopathy, unspecified type ([facility]    4. S/P coronary artery stent placement    5. Chronic diastolic HF (heart failure) ([facility]    6. Mixed hyperlipidemia      Follow-up and Dispositions    · Return in about 3 months (around 11/2/2019). Follow-up and Dispositions    · Return in about 3 months (around 11/2/2019).

## 2019-08-29 RX ORDER — CLOPIDOGREL BISULFATE 75 MG/1
TABLET ORAL
Qty: 30 TAB | Refills: 3 | OUTPATIENT
Start: 2019-08-29

## 2019-09-03 ENCOUNTER — TELEPHONE (OUTPATIENT)
Dept: CARDIOLOGY CLINIC | Age: 69
End: 2019-09-03

## 2019-09-03 NOTE — TELEPHONE ENCOUNTER
Attempt to call patient at phone numbers provided, pt not available. Cell phone number provided is incorrect, belongs to someone else.

## 2019-09-04 DIAGNOSIS — I42.9 CARDIOMYOPATHY, UNSPECIFIED TYPE (HCC): ICD-10-CM

## 2019-09-04 RX ORDER — CARVEDILOL 25 MG/1
TABLET ORAL
Qty: 60 TAB | Refills: 1 | Status: SHIPPED | OUTPATIENT
Start: 2019-09-04 | End: 2019-11-02 | Stop reason: SDUPTHER

## 2019-09-04 NOTE — TELEPHONE ENCOUNTER
Osbaldo Manley., NP  You 6 days ago      Pls call patient. He may dc Plavix by end of this month as it will be one year since his stents were placed.  Afterwhich, he should start taking baby ASA. Thalia Ritter with patient  Verified patient with 2 patient identifiers    Pt informed, verbalized understanding.

## 2019-09-09 RX ORDER — CLOPIDOGREL BISULFATE 75 MG/1
TABLET ORAL
Qty: 30 TAB | Refills: 3 | Status: SHIPPED | OUTPATIENT
Start: 2019-09-09 | End: 2019-09-18

## 2019-09-18 ENCOUNTER — OFFICE VISIT (OUTPATIENT)
Dept: CARDIOLOGY CLINIC | Age: 69
End: 2019-09-18

## 2019-09-18 VITALS
RESPIRATION RATE: 16 BRPM | DIASTOLIC BLOOD PRESSURE: 70 MMHG | WEIGHT: 241 LBS | BODY MASS INDEX: 36.53 KG/M2 | SYSTOLIC BLOOD PRESSURE: 100 MMHG | HEART RATE: 60 BPM | OXYGEN SATURATION: 96 % | HEIGHT: 68 IN

## 2019-09-18 DIAGNOSIS — I48.0 PAROXYSMAL ATRIAL FIBRILLATION (HCC): ICD-10-CM

## 2019-09-18 DIAGNOSIS — I10 ESSENTIAL HYPERTENSION, BENIGN: Primary | ICD-10-CM

## 2019-09-18 DIAGNOSIS — I25.10 CORONARY ARTERY DISEASE INVOLVING NATIVE CORONARY ARTERY OF NATIVE HEART WITHOUT ANGINA PECTORIS: ICD-10-CM

## 2019-09-18 DIAGNOSIS — I50.22 SYSTOLIC CHF, CHRONIC (HCC): ICD-10-CM

## 2019-09-18 DIAGNOSIS — E78.2 MIXED HYPERLIPIDEMIA: ICD-10-CM

## 2019-09-18 DIAGNOSIS — Z95.810 PRESENCE OF AUTOMATIC CARDIOVERTER/DEFIBRILLATOR (AICD): ICD-10-CM

## 2019-09-18 RX ORDER — ASPIRIN 81 MG/1
TABLET ORAL DAILY
COMMUNITY

## 2019-09-18 NOTE — PROGRESS NOTES
1. Have you been to the ER, urgent care clinic since your last visit? Hospitalized since your last visit? No.    2. Have you seen or consulted any other health care providers outside of the [address] [place] [address] since your last visit? Include any pap smears or colon screening. Seen by PCP and being treated for cellulitis  in rt leg since June.       Chief Complaint   Patient presents with    Follow-up     6 month- pt denies any cardiac symptoms

## 2019-09-18 NOTE — PROGRESS NOTES
Heath Urban, [facility]    Subjective/HPI:     Mr. Lytle Phalen is a 78 y.o. male is here for routine f/u. He has a PMHx of CAD, ischemic cardiomyopathy now resolved s/p BiV ICD, PAF, HTN and HLD. He is doing well. He denies complaints of chest pains, dizziness, orthopnea, PND or edema. He denies palpitations or shortness of breath symptoms. He has been battling cellulitis of the right leg for a few months. This is finally improving after two rounds of antibiotics and OTC neosporin.          PCP Provider  Daniela Reyes MD  Past Medical History:   Diagnosis Date    A-fib [facility]) 8/10/2018    Abnormal PSA 2/16/2011    Abnormal stress ECG 8/10/2018    CAD (coronary artery disease) 2/16/2011    Cardiomyopathy ([address] 2/16/2011    Chronic diastolic HF (heart failure) ([address] 8/10/2018    Encounter for long-term (current) use of other medications 2/16/2011    Esophageal reflux 2/16/2011    Essential hypertension, benign 2/16/2011    GERD (gastroesophageal reflux disease) 5/14/2012    Heart attack ([address]     Low back pain 7/2/2013    Mixed hyperlipidemia 2/16/2011    Nocturia 2/16/2011    Pacemaker     Presence of biventricular AICD 8/10/2018    [employer] BIVICD implanted 5/2017    S/P coronary artery stent placement 8/10/2018    8/9/18 PCI/SUREKHA to LAD, RCA x 2    Systolic CHF, chronic (HCC) 8/10/2018    VF (ventricular fibrillation) ([address] 8/10/2018    VT (ventricular tachycardia) ([address] 8/10/2018      Past Surgical History:   Procedure Laterality Date    HX CERVICAL FUSION  1997    c4,5,6    HX PACEMAKER      [place] COLONOSCOPY FLX DX W/COLLJ SPEC WHEN PFRMD  5/14/2012         [place] EGD TRANSORAL BIOPSY SINGLE/MULTIPLE  5/14/2012         REMOVAL GALLBLADDER       Family History   Problem Relation Age of Onset    Lung Disease Mother     Cancer Mother     Alcohol abuse Father     Heart Disease Father      Social History     Socioeconomic History    Marital status:  Spouse name: Not on file    Number of children: Not on file    Years of education: Not on file    Highest education level: Not on file   Occupational History    Not on file   Social Needs    Financial resource strain: Not on file    Food insecurity:     Worry: Not on file     Inability: Not on file    Transportation needs:     Medical: Not on file     Non-medical: Not on file   Tobacco Use    Smoking status: Never Smoker    Smokeless tobacco: Never Used   Substance and Sexual Activity    Alcohol use: No    Drug use: No    Sexual activity: Not on file   Lifestyle    Physical activity:     Days per week: Not on file     Minutes per session: Not on file    Stress: Not on file   Relationships    Social connections:     Talks on phone: Not on file     Gets together: Not on file     Attends [employer] service: Not on file     Active member of club or organization: Not on file     Attends meetings of clubs or organizations: Not on file     Relationship status: Not on file    Intimate partner violence:     Fear of current or ex partner: Not on file     Emotionally abused: Not on file     Physically abused: Not on file     Forced sexual activity: Not on file   Other Topics Concern    Not on file   Social History Narrative    Not on file       No Known Allergies     Current Outpatient Medications   Medication Sig    aspirin delayed-release 81 mg tablet Take  by mouth daily.  carvedilol (COREG) 25 mg tablet TAKE 1 TABLET BY MOUTH TWO (2) TIMES DAILY (WITH MEALS).  furosemide (LASIX) 20 mg tablet TAKE 1 TABLET EVERY DAY IN THE MORNING FOR FLUID    ELIQUIS 5 mg tablet TAKE 1 TABLET BY MOUTH TWO (2) TIMES A DAY. FOR BLOOD THINNER    ezetimibe (ZETIA) 10 mg tablet TAKE 1 TABLET BY MOUTH EVERY DAY    rosuvastatin (CRESTOR) 20 mg tablet Take 1 Tab by mouth daily. HALF TAB DAILY    cyclobenzaprine (FLEXERIL) 10 mg tablet Take 1 Tab by mouth three (3) times daily as needed for Muscle Spasm(s).     OTHER  LEVITRA 20 mg tablet Take 20 mg by mouth as needed.  captopril (CAPOTEN) 12.5 mg tablet Take 1 Tab by mouth two (2) times a day. (Patient taking differently: Take 12.5 mg by mouth two (2) times a day. Indications: HALF TAB BID)    nitroglycerin (NITROLINGUAL) 400 mcg/spray spray 1 Spray by SubLINGual route every five (5) minutes as needed.  cyanocobalamin 1,000 mcg tablet Take 1,000 mcg by mouth daily.  omega-3-dha-epa-dpa-fish oil 1,050-1,200 mg cap Take 1 Cap by mouth two (2) times a day.  cholecalciferol, vitamin d3, (VITAMIN D) 1,000 unit tablet Take  by mouth daily.  multivitamins-minerals-lutein (CENTRUM SILVER) Tab Take 1 Tab by mouth daily.  tamsulosin (FLOMAX) 0.4 mg capsule TAKE 1 CAPSULE EVERY DAY     No current facility-administered medications for this visit. I have reviewed the problem list, allergy list, medical history, family, social history and medications. Review of Symptoms:    Review of Systems   Constitutional: Negative for chills, fever and weight loss. HENT: Negative for nosebleeds. Eyes: Negative for blurred vision and double vision. Respiratory: Negative for cough, shortness of breath and wheezing. Cardiovascular: Negative for chest pain, palpitations, orthopnea, leg swelling and PND. Gastrointestinal: Negative for abdominal pain, blood in stool, diarrhea, nausea and vomiting. Musculoskeletal: Negative for joint pain. Skin: Negative for rash. Neurological: Negative for dizziness, tingling and loss of consciousness. Endo/Heme/Allergies: Does not bruise/bleed easily. Physical Exam:      General: Well developed, in no acute distress, cooperative and alert  HEENT: No carotid bruits, no JVD, trach is midline. Neck Supple, PEERL, EOM intact. Heart:  reg rate and rhythm; normal S1/S2; no murmurs, gallops or rubs. Respiratory: Clear bilaterally x 4, no wheezing or rales  Abdomen:   Soft, non-tender, no distention, no masses. + BS. Extremities:  Normal cap refill, no cyanosis, atraumatic. No edema. Neuro: A&Ox3, speech clear, gait stable. Skin: Skin color is normal. No rashes or lesions. Non diaphoretic  Vascular: 2+ pulses symmetric in all extremities    Vitals:    09/18/19 0850 09/18/19 0904   BP: 104/68 100/70   Pulse: 60    Resp: 16    SpO2: 96%    Weight: 241 lb (109.3 kg)    Height: 5' 8\" (1.727 m)        Cardiographics    ECG: V-paced rhythm  Results for orders placed or performed during the hospital encounter of 05/06/19   EKG, 12 LEAD, INITIAL   Result Value Ref Range    Ventricular Rate 64 BPM    Atrial Rate 64 BPM    P-R Interval 204 ms    QRS Duration 146 ms    Q-T Interval 466 ms    QTC Calculation (Bezet) 480 ms    Calculated P Axis 16 degrees    Calculated R Axis -92 degrees    Calculated T Axis -19 degrees    Diagnosis       AV dual-paced rhythm with frequent ventricular-paced complexes  Biventricular pacemaker detected  When compared with ECG of 10-AUG-2018 04:36,  Previous ECG has undetermined rhythm, needs review  Confirmed by Sukhdeep Bustamante ([id number]) on 5/6/2019 2:06:20 PM         Cardiology Labs:  Lab Results   Component Value Date/Time    Cholesterol, total 138 02/26/2019 02:24 PM    HDL Cholesterol 52 02/26/2019 02:24 PM    LDL, calculated 68 02/26/2019 02:24 PM    Triglyceride 92 02/26/2019 02:24 PM       Lab Results   Component Value Date/Time    Sodium 145 (H) 02/26/2019 02:24 PM    Potassium 4.3 02/26/2019 02:24 PM    Chloride 106 02/26/2019 02:24 PM    CO2 20 02/26/2019 02:24 PM    Anion gap 7 08/10/2018 04:41 AM    Glucose 94 02/26/2019 02:24 PM    BUN 16 02/26/2019 02:24 PM    Creatinine 1.29 (H) 02/26/2019 02:24 PM    BUN/Creatinine ratio 12 02/26/2019 02:24 PM    GFR est AA 65 02/26/2019 02:24 PM    GFR est non-AA 56 (L) 02/26/2019 02:24 PM    Calcium 9.6 02/26/2019 02:24 PM    Bilirubin, total 1.2 02/26/2019 02:24 PM    AST (SGOT) 27 02/26/2019 02:24 PM    Alk.  phosphatase 40 02/26/2019 02:24 PM Protein, total 6.4 02/26/2019 02:24 PM    Albumin 4.6 02/26/2019 02:24 PM    Globulin 2.3 07/29/2010 02:23 PM    A-G Ratio 2.6 (H) 02/26/2019 02:24 PM    ALT (SGPT) 26 02/26/2019 02:24 PM           Assessment:     Assessment:       ICD-10-CM ICD-9-CM    1. Essential hypertension, benign I10 401.1    2. Coronary artery disease involving native coronary artery of native heart without angina pectoris I25.10 414.01 AMB POC EKG ROUTINE W/ 12 LEADS, INTER & REP   3. Paroxysmal atrial fibrillation (HCC) I48.0 427.31    4. Systolic CHF, chronic (HCC) I50.22 428.22      428.0    5. Mixed hyperlipidemia E78.2 272.2    6. Presence of automatic cardioverter/defibrillator (AICD) Z95.810 V45.02         Plan:     1. Coronary artery disease involving native coronary artery of native heart without angina pectoris  S/p SUREKHA to the dRCA and mLAD in 8/2018. Stable; denies anginal or anginal equivalent symptoms  Continue medical management with BB, ASA and statin therapy    2. Paroxysmal atrial fibrillation (HCC)  2 episodes of AF/flutter noted on recent ICD interrogation in 7/2018  Remains asymptomatic; continue with Eliquis therapy and Coreg    3. Systolic CHF, chronic ([address]  Resolved  Echo in 9/2017 with preserved ejection fraction 55% with grade 1 DD with mild MR  Euvolemic on exam today  NYHA FC 1  Continue with captopril, coreg and Lasix    4. Mixed hyperlipidemia  LDL 68 in 2/2019  Continue with statin therapy and low fat, low cholesterol diet    5. Essential hypertension, benign  BP controlled. Continue anti-hypertensive therapy and low sodium diet    6.   Presence of automatic cardioverter/defibrillator  With BiV ICD implant  Continue with routine device interrogation with Dr. Chelsea Young    F/u with Dr. Piyush Figueroa in 6 months    Marisa Gordon MD

## 2019-10-21 ENCOUNTER — OFFICE VISIT (OUTPATIENT)
Dept: CARDIOLOGY CLINIC | Age: 69
End: 2019-10-21

## 2019-10-21 ENCOUNTER — CLINICAL SUPPORT (OUTPATIENT)
Dept: CARDIOLOGY CLINIC | Age: 69
End: 2019-10-21

## 2019-10-21 VITALS
SYSTOLIC BLOOD PRESSURE: 124 MMHG | BODY MASS INDEX: 36.07 KG/M2 | RESPIRATION RATE: 18 BRPM | DIASTOLIC BLOOD PRESSURE: 72 MMHG | WEIGHT: 238 LBS | HEART RATE: 60 BPM | HEIGHT: 68 IN | OXYGEN SATURATION: 97 %

## 2019-10-21 DIAGNOSIS — I42.9 CARDIOMYOPATHY, UNSPECIFIED TYPE (HCC): ICD-10-CM

## 2019-10-21 DIAGNOSIS — I48.0 PAROXYSMAL ATRIAL FIBRILLATION (HCC): Primary | ICD-10-CM

## 2019-10-21 DIAGNOSIS — Z95.810 BIVENTRICULAR ICD (IMPLANTABLE CARDIOVERTER-DEFIBRILLATOR) IN PLACE: ICD-10-CM

## 2019-10-21 DIAGNOSIS — I50.22 SYSTOLIC CHF, CHRONIC (HCC): ICD-10-CM

## 2019-10-21 DIAGNOSIS — I10 ESSENTIAL HYPERTENSION, BENIGN: ICD-10-CM

## 2019-10-21 DIAGNOSIS — Z95.810 BIVENTRICULAR ICD (IMPLANTABLE CARDIOVERTER-DEFIBRILLATOR) IN PLACE: Primary | ICD-10-CM

## 2019-10-21 NOTE — PROGRESS NOTES
Verified patient with 2 identifiers   Reviewed record in preparation for visit and obtained necessary documentation. Chief Complaint   Patient presents with    Pacemaker Check     Annual with device check    Irregular Heart Beat    Leg Swelling     right lower - recently had cellulitis     1. Have you been to the ER, urgent care clinic since your last visit? Hospitalized since your last visit? Yes ED [facility] ER for arthrits flare     2. Have you seen or consulted any other health care providers outside of the [address] [place] [address] since your last visit? Include any pap smears or colon screening.   No

## 2019-10-21 NOTE — PROGRESS NOTES
Subjective:      Winsome Chavez is a 78 y.o. male is here for annual device follow up. He is doing well. The patient denies chest pain/ shortness of breath, orthopnea, PND, LE edema, palpitations, syncope, presyncope or fatigue. Patient Active Problem List    Diagnosis Date Noted    [facility]) 08/10/2018    Presence of biventricular AICD 08/10/2018    Abnormal stress ECG 08/10/2018    VT (ventricular tachycardia) ([facility]) 08/10/2018    Chronic diastolic HF (heart failure) ([address] 99/04/1877    Systolic CHF, chronic ([address] 08/10/2018    S/P coronary artery stent placement 08/10/2018    VF (ventricular fibrillation) ([address] 08/10/2018    Severe obesity (BMI 35.0-39. 9) with comorbidity ([address] 04/19/2018    Low back pain 07/02/2013    Automatic implantable cardioverter-defibrillator in situ 06/11/2012    GERD (gastroesophageal reflux disease) 05/14/2012    Hematochezia 05/14/2012    Screen for colon cancer 05/14/2012    CAD (coronary artery disease) 02/16/2011    Mixed hyperlipidemia 02/16/2011    Cardiomyopathy ([address] 02/16/2011    Esophageal reflux 02/16/2011    Encounter for long-term (current) use of other medications 02/16/2011    Nocturia 02/16/2011    Abnormal PSA 02/16/2011    Essential hypertension, benign 02/16/2011      Moni Amaya MD  Past Medical History:   Diagnosis Date    [facility]) 8/10/2018    Abnormal PSA 2/16/2011    Abnormal stress ECG 8/10/2018    CAD (coronary artery disease) 2/16/2011    Cardiomyopathy ([address] 2/16/2011    Chronic diastolic HF (heart failure) ([address] 8/10/2018    Encounter for long-term (current) use of other medications 2/16/2011    Esophageal reflux 2/16/2011    Essential hypertension, benign 2/16/2011    GERD (gastroesophageal reflux disease) 5/14/2012    Heart attack ([address]     Low back pain 7/2/2013    Mixed hyperlipidemia 2/16/2011    Nocturia 2/16/2011    Pacemaker     Presence of biventricular AICD 8/10/2018    [facility] [employer] BIVICD implanted 5/2017    S/P coronary artery stent placement 8/10/2018    8/9/18 PCI/SUREKHA to LAD, RCA x 2    Systolic [facility]F, chronic [facility] 8/10/2018    VF (ventricular fibrillation) ([facility] 8/10/2018    VT [facility] tachycardia) ([facility] 8/10/2018      Past Surgical History:   Procedure Laterality Date    HX CERVICAL FUSION  1997    c4,5,6    HX PACEMAKER      [place] COLONOSCOPY FLX DX W/COLLJ SPEC WHEN PFRMD  5/14/2012         [place] EGD TRANSORAL BIOPSY SINGLE/MULTIPLE  5/14/2012         REMOVAL GALLBLADDER       No Known Allergies   Family History   Problem Relation Age of Onset    Lung Disease Mother     Cancer Mother     Alcohol abuse Father     Heart Disease Father     negative for cardiac disease  Social History     Socioeconomic History    Marital status:      Spouse name: Not on file    Number of children: Not on file    Years of education: Not on file    Highest education level: Not on file   Tobacco Use    Smoking status: Never Smoker    Smokeless tobacco: Never Used   Substance and Sexual Activity    Alcohol use: No    Drug use: No     Current Outpatient Medications   Medication Sig    aspirin delayed-release 81 mg tablet Take  by mouth daily.  carvedilol (COREG) 25 mg tablet TAKE 1 TABLET BY MOUTH TWO (2) TIMES DAILY (WITH MEALS).  furosemide (LASIX) 20 mg tablet TAKE 1 TABLET EVERY DAY IN THE MORNING FOR FLUID    ELIQUIS 5 mg tablet TAKE 1 TABLET BY MOUTH TWO (2) TIMES A DAY. FOR BLOOD THINNER    ezetimibe (ZETIA) 10 mg tablet TAKE 1 TABLET BY MOUTH EVERY DAY    rosuvastatin (CRESTOR) 20 mg tablet Take 1 Tab by mouth daily. HALF TAB DAILY    cyclobenzaprine (FLEXERIL) 10 mg tablet Take 1 Tab by mouth three (3) times daily as needed for Muscle Spasm(s).  tamsulosin (FLOMAX) 0.4 mg capsule TAKE 1 CAPSULE EVERY DAY    OTHER     LEVITRA 20 mg tablet Take 20 mg by mouth as needed.  captopril (CAPOTEN) 12.5 mg tablet Take 1 Tab by mouth two (2) times a day. (Patient taking differently: Take 12.5 mg by mouth two (2) times a day. Indications: HALF TAB BID)    nitroglycerin (NITROLINGUAL) 400 mcg/spray spray 1 Spray by SubLINGual route every five (5) minutes as needed.  cyanocobalamin 1,000 mcg tablet Take 1,000 mcg by mouth daily.  omega-3-dha-epa-dpa-fish oil 1,050-1,200 mg cap Take 1 Cap by mouth two (2) times a day.  cholecalciferol, vitamin d3, (VITAMIN D) 1,000 unit tablet Take  by mouth daily.  multivitamins-minerals-lutein (CENTRUM SILVER) Tab Take 1 Tab by mouth daily. No current facility-administered medications for this visit. Vitals:    10/21/19 1446   BP: 124/72   Pulse: 60   Resp: 18   SpO2: 97%   Weight: 238 lb (108 kg)   Height: 5' 8\" (1.727 m)       I have reviewed the nurses notes, vitals, problem list, allergy list, medical history, family, social history and medications. Review of Symptoms:    General: Pt denies excessive weight gain or loss. Pt is able to conduct ADL's  HEENT: Denies blurred vision, headaches, epistaxis and difficulty swallowing. Respiratory: Denies shortness of breath, WING, wheezing or stridor. Cardiovascular: Denies precordial pain, palpitations, edema or PND  Gastrointestinal: Denies poor appetite, indigestion, abdominal pain or blood in stool  Urinary: Denies dysuria, pyuria  Musculoskeletal: Denies pain or swelling from muscles or joints  Neurologic: Denies tremor, paresthesias, or sensory motor disturbance  Skin: Denies rash, itching or texture change. Psych: Denies depression      Physical Exam:      General: Well developed, in no acute distress. HEENT: Eyes - PERRL, no jvd  Heart:  Normal S1/S2 negative S3 or S4. Regular, no murmur, gallop or rub. Respiratory: Clear bilaterally x 4, no wheezing or rales  Abdomen:   Soft, non-tender, bowel sounds are active. Extremities:  No edema, normal cap refill, no cyanosis. Musculoskeletal: No clubbing  Neuro: A&Ox3, speech clear, gait stable.    Skin: Skin color is normal. No rashes or lesions. Non diaphoretic  Vascular: 2+ pulses symmetric in all extremities    Cardiographics    Ekg: vpacing    Results for orders placed or performed during the hospital encounter of 05/06/19   EKG, 12 LEAD, INITIAL   Result Value Ref Range    Ventricular Rate 64 BPM    Atrial Rate 64 BPM    P-R Interval 204 ms    QRS Duration 146 ms    Q-T Interval 466 ms    QTC Calculation (Bezet) 480 ms    Calculated P Axis 16 degrees    Calculated R Axis -92 degrees    Calculated T Axis -19 degrees    Diagnosis       AV dual-paced rhythm with frequent ventricular-paced complexes  Biventricular pacemaker detected  When compared with ECG of 10-AUG-2018 04:36,  Previous ECG has undetermined rhythm, needs review  Confirmed by Susan Rosales ([id number]) on 5/6/2019 2:06:20 PM           Lab Results   Component Value Date/Time    WBC 5.7 02/26/2019 02:24 PM    HGB 14.3 02/26/2019 02:24 PM    HCT 41.7 02/26/2019 02:24 PM    PLATELET 037 (L) 05[birth year]1958 02:24 PM    MCV 92 02/26/2019 02:24 PM      Lab Results   Component Value Date/Time    Sodium 145 (H) 02/26/2019 02:24 PM    Potassium 4.3 02/26/2019 02:24 PM    Chloride 106 02/26/2019 02:24 PM    CO2 20 02/26/2019 02:24 PM    Anion gap 7 08/10/2018 04:41 AM    Glucose 94 02/26/2019 02:24 PM    BUN 16 02/26/2019 02:24 PM    Creatinine 1.29 (H) 02/26/2019 02:24 PM    BUN/Creatinine ratio 12 02/26/2019 02:24 PM    GFR est AA 65 02/26/2019 02:24 PM    GFR est non-AA 56 (L) 02/26/2019 02:24 PM    Calcium 9.6 02/26/2019 02:24 PM    Bilirubin, total 1.2 02/26/2019 02:24 PM    AST (SGOT) 27 02/26/2019 02:24 PM    Alk. phosphatase 40 02/26/2019 02:24 PM    Protein, total 6.4 02/26/2019 02:24 PM    Albumin 4.6 02/26/2019 02:24 PM    Globulin 2.3 07/29/2010 02:23 PM    A-G Ratio 2.6 (H) 02/26/2019 02:24 PM    ALT (SGPT) 26 02/26/2019 02:24 PM         Assessment:     Assessment:        ICD-10-CM ICD-9-CM    1.  Paroxysmal atrial fibrillation (HCC) I48.0 427.31 AMB POC EKG ROUTINE W/ 12 LEADS, INTER & REP   2. Systolic CHF, chronic (HCC) I50.22 428.22      428.0    3. Essential hypertension, benign I10 401.1    4. Cardiomyopathy, unspecified type ([facility] I42.9 425.4    5. Biventricular ICD (implantable cardioverter-defibrillator) in place Z95.810 V45.02      Orders Placed This Encounter    AMB POC EKG ROUTINE W/ 12 LEADS, INTER & REP     Order Specific Question:   Reason for Exam:     Answer:   routine        Plan:   Mr. Heide Velazquez is here for device follow up. He is doing well and denies cardiac complaints. He's had significant improvement in his symptoms with upgrade and PTCA. EKG shows dual chamber pacing. Device interrogation demonstrates 93% AP and 98% BIV pacing without further AF. He is tolerating Eliquis. Will continue per device clinic and follow up in one year. Continue medical management for AF, HF, HTN, CHF. Thank you for allowing me to participate in [facility] 's care.     Real Skiff, NP

## 2019-10-28 RX ORDER — TAMSULOSIN HYDROCHLORIDE 0.4 MG/1
CAPSULE ORAL
Qty: 90 CAP | Refills: 1 | Status: SHIPPED | OUTPATIENT
Start: 2019-10-28 | End: 2020-04-17

## 2019-11-02 DIAGNOSIS — I42.9 CARDIOMYOPATHY, UNSPECIFIED TYPE (HCC): ICD-10-CM

## 2019-11-04 RX ORDER — CARVEDILOL 25 MG/1
TABLET ORAL
Qty: 60 TAB | Refills: 1 | Status: SHIPPED | OUTPATIENT
Start: 2019-11-04 | End: 2020-01-28

## 2019-11-04 RX ORDER — EZETIMIBE 10 MG/1
TABLET ORAL
Qty: 90 TAB | Refills: 0 | Status: SHIPPED | OUTPATIENT
Start: 2019-11-04 | End: 2020-01-28

## 2019-11-27 DIAGNOSIS — I48.0 PAROXYSMAL ATRIAL FIBRILLATION (HCC): ICD-10-CM

## 2019-11-29 RX ORDER — APIXABAN 5 MG/1
TABLET, FILM COATED ORAL
Qty: 60 TAB | Refills: 3 | Status: SHIPPED | OUTPATIENT
Start: 2019-11-29 | End: 2020-03-26

## 2019-12-26 DIAGNOSIS — H10.9 CONJUNCTIVITIS OF RIGHT EYE, UNSPECIFIED CONJUNCTIVITIS TYPE: ICD-10-CM

## 2019-12-26 DIAGNOSIS — L03.115 CELLULITIS OF RIGHT LEG: ICD-10-CM

## 2019-12-26 RX ORDER — ERYTHROMYCIN 5 MG/G
OINTMENT OPHTHALMIC
Qty: 3.5 G | Refills: 1 | Status: SHIPPED | OUTPATIENT
Start: 2019-12-26 | End: 2020-08-11

## 2019-12-26 RX ORDER — AMOXICILLIN AND CLAVULANATE POTASSIUM 875; 125 MG/1; MG/1
TABLET, FILM COATED ORAL
Qty: 20 TAB | Refills: 0 | Status: SHIPPED | OUTPATIENT
Start: 2019-12-26 | End: 2020-03-04

## 2020-01-25 DIAGNOSIS — I42.9 CARDIOMYOPATHY, UNSPECIFIED TYPE (HCC): ICD-10-CM

## 2020-01-28 ENCOUNTER — CLINICAL SUPPORT (OUTPATIENT)
Dept: CARDIOLOGY CLINIC | Age: 70
End: 2020-01-28

## 2020-01-28 DIAGNOSIS — Z95.810 BIVENTRICULAR ICD (IMPLANTABLE CARDIOVERTER-DEFIBRILLATOR) IN PLACE: Primary | ICD-10-CM

## 2020-01-28 DIAGNOSIS — I42.9 CARDIOMYOPATHY, UNSPECIFIED TYPE (HCC): ICD-10-CM

## 2020-01-28 RX ORDER — CARVEDILOL 25 MG/1
TABLET ORAL
Qty: 60 TAB | Refills: 0 | Status: SHIPPED | OUTPATIENT
Start: 2020-01-28 | End: 2020-04-24

## 2020-01-28 RX ORDER — EZETIMIBE 10 MG/1
TABLET ORAL
Qty: 90 TAB | Refills: 0 | Status: SHIPPED | OUTPATIENT
Start: 2020-01-28 | End: 2020-04-13 | Stop reason: SDUPTHER

## 2020-01-28 RX ORDER — EZETIMIBE 10 MG/1
TABLET ORAL
Qty: 90 TAB | Refills: 0 | Status: SHIPPED | OUTPATIENT
Start: 2020-01-28 | End: 2020-08-21

## 2020-03-03 DIAGNOSIS — L03.115 CELLULITIS OF RIGHT LEG: ICD-10-CM

## 2020-03-04 RX ORDER — AMOXICILLIN AND CLAVULANATE POTASSIUM 875; 125 MG/1; MG/1
TABLET, FILM COATED ORAL
Qty: 20 TAB | Refills: 0 | Status: SHIPPED | OUTPATIENT
Start: 2020-03-04 | End: 2020-04-09

## 2020-03-26 DIAGNOSIS — I48.0 PAROXYSMAL ATRIAL FIBRILLATION (HCC): ICD-10-CM

## 2020-03-26 RX ORDER — FUROSEMIDE 20 MG/1
TABLET ORAL
Qty: 30 TAB | Refills: 5 | Status: SHIPPED | OUTPATIENT
Start: 2020-03-26 | End: 2020-10-07

## 2020-03-26 RX ORDER — APIXABAN 5 MG/1
TABLET, FILM COATED ORAL
Qty: 60 TAB | Refills: 2 | Status: SHIPPED | OUTPATIENT
Start: 2020-03-26 | End: 2020-07-15

## 2020-04-08 DIAGNOSIS — L03.115 CELLULITIS OF RIGHT LEG: ICD-10-CM

## 2020-04-09 RX ORDER — AMOXICILLIN AND CLAVULANATE POTASSIUM 875; 125 MG/1; MG/1
TABLET, FILM COATED ORAL
Qty: 20 TAB | Refills: 0 | Status: SHIPPED | OUTPATIENT
Start: 2020-04-09 | End: 2020-04-13 | Stop reason: ALTCHOICE

## 2020-04-13 ENCOUNTER — VIRTUAL VISIT (OUTPATIENT)
Dept: CARDIOLOGY CLINIC | Age: 70
End: 2020-04-13

## 2020-04-13 DIAGNOSIS — Z95.810 BIVENTRICULAR ICD (IMPLANTABLE CARDIOVERTER-DEFIBRILLATOR) IN PLACE: ICD-10-CM

## 2020-04-13 DIAGNOSIS — I48.0 PAROXYSMAL ATRIAL FIBRILLATION (HCC): ICD-10-CM

## 2020-04-13 DIAGNOSIS — I25.10 CORONARY ARTERY DISEASE INVOLVING NATIVE CORONARY ARTERY OF NATIVE HEART WITHOUT ANGINA PECTORIS: Primary | ICD-10-CM

## 2020-04-13 DIAGNOSIS — I50.22 SYSTOLIC CHF, CHRONIC (HCC): ICD-10-CM

## 2020-04-13 DIAGNOSIS — I42.9 CARDIOMYOPATHY, UNSPECIFIED TYPE (HCC): ICD-10-CM

## 2020-04-13 RX ORDER — GENTAMICIN SULFATE 1 MG/G
OINTMENT TOPICAL 4 TIMES DAILY
COMMUNITY
Start: 2020-04-08 | End: 2021-06-17 | Stop reason: ALTCHOICE

## 2020-04-13 NOTE — PROGRESS NOTES
Leno Cobb MD          NAME:  Constantin Dubois   :   1950   MRN:   [id number]   PCP:  Lawrence Riddle MD    Constantin Dubois is a 69 y.o. male who was seen by synchronous (real-time) audio-video technology on 2020     Subjective: The patient is a 69y.o. year old male  who returns for a routine follow-up. Since the last visit, patient reports no change in exercise tolerance, chest pain, edema, medication intolerance, palpitations, shortness of breath, PND/orthopnea wheezing, sputum, syncope, dizziness or light headedness. Doing well. Past Medical History:   Diagnosis Date    A-fib [facility]) 8/10/2018    Abnormal PSA 2011    Abnormal stress ECG 8/10/2018    CAD (coronary artery disease) 2011    [facility] 2011    Chronic diastolic HF (heart failure) ([facility] 8/10/2018    Encounter for long-term (current) use of other medications 2011    Esophageal reflux 2011    Essential hypertension, benign 2011    GERD (gastroesophageal reflux disease) 2012    Heart attack ([facility]     Low back pain 2013    Mixed hyperlipidemia 2011    Nocturia 2011    Pacemaker     Presence of biventricular AICD 8/10/2018    [employer] BIVICD implanted 2017    S/P coronary artery stent placement 8/10/2018    8/9/18 PCI/SUREKHA to LAD, RCA x 2    Systolic CHF, chronic (HCC) 8/10/2018    VF (ventricular fibrillation) ([facility]) 8/10/2018    VT (ventricular tachycardia) ([facility] 8/10/2018        ICD-10-CM ICD-9-CM    1. Coronary artery disease involving native coronary artery of native heart without angina pectoris I25.10 414.01    2. Paroxysmal atrial fibrillation (HCC) I48.0 427.31    3. Biventricular ICD (implantable cardioverter-defibrillator) in place Z95.810 V45.02    4. Cardiomyopathy, unspecified type ([facility] I42.9 425.4    5.  Systolic CHF, chronic ([facility]) I50.22 428.22      428.0       Social History     Tobacco Use    Smoking status: Never Smoker  Smokeless tobacco: Never Used   Substance Use Topics    Alcohol use: No      Family History   Problem Relation Age of Onset    Lung Disease Mother     Cancer Mother     Alcohol abuse Father     Heart Disease Father         Review of Systems  Cardiovascular: Negative except as noted in HPI      Objective:       Vitals: There is no height or weight on file to calculate BMI. General PE    Mental Status - Alert. General Appearance - Not in acute distress. HEENT:  PERRL, no JVD. Chest and Lung Exam   Inspection: Accessory muscles - No use of accessory muscles in breathing. Cardiovascular   Inspection: Jugular vein - Bilateral - Inspection Normal.    Peripheral Vascular   Upper Extremity: Inspection - Bilateral - No Cyanotic nailbeds or Digital clubbing. Lower Extremity:   Palpation: Edema - Bilateral - No edema. Neuro  Alert and Oriented X 3          Data Review:       LABS- @brieflabs@      Allergies reviewed  No Known Allergies    Medications reviewed  Current Outpatient Medications   Medication Sig    Eliquis 5 mg tablet TAKE 1 TABLET BY MOUTH TWO (2) TIMES A DAY. FOR BLOOD THINNER    furosemide (LASIX) 20 mg tablet T1T QAM FOR FLUID    carvediloL (COREG) 25 mg tablet TAKE 1 TABLET BY MOUTH TWO (2) TIMES DAILY (WITH MEALS).  ezetimibe (ZETIA) 10 mg tablet TAKE 1 TABLET BY MOUTH EVERY DAY    tamsulosin (FLOMAX) 0.4 mg capsule TAKE 1 CAPSULE EVERY DAY    aspirin delayed-release 81 mg tablet Take  by mouth daily.  rosuvastatin (CRESTOR) 20 mg tablet Take 1 Tab by mouth daily. HALF TAB DAILY    OTHER     captopril (CAPOTEN) 12.5 mg tablet Take 1 Tab by mouth two (2) times a day. (Patient taking differently: Take 12.5 mg by mouth two (2) times a day. Indications: HALF TAB BID)    nitroglycerin (NITROLINGUAL) 400 mcg/spray spray 1 Spray by SubLINGual route every five (5) minutes as needed.  cyanocobalamin 1,000 mcg tablet Take 1,000 mcg by mouth daily.     omega-3-dha-epa-dpa-fish oil 1,050-1,200 mg cap Take 1 Cap by mouth two (2) times a day.  cholecalciferol, vitamin d3, (VITAMIN D) 1,000 unit tablet Take  by mouth daily.  multivitamins-minerals-lutein (CENTRUM SILVER) Tab Take 1 Tab by mouth daily.  gentamicin (GARAMYCIN) 0.1 % topical ointment     erythromycin (ILOTYCIN) ophthalmic ointment APPLY 1/2-INCH STRIP TO THE RIGHT EYE THREE TIMES DAILY FOR 10 DAYS    cyclobenzaprine (FLEXERIL) 10 mg tablet Take 1 Tab by mouth three (3) times daily as needed for Muscle Spasm(s).  LEVITRA 20 mg tablet Take 20 mg by mouth as needed. No current facility-administered medications for this visit. Assessment:       ICD-10-CM ICD-9-CM    1. Coronary artery disease involving native coronary artery of native heart without angina pectoris I25.10 414.01    2. Paroxysmal atrial fibrillation (HCC) I48.0 427.31    3. Biventricular ICD (implantable cardioverter-defibrillator) in place Z95.810 V45.02    4. Cardiomyopathy, unspecified type ([facility] I42.9 425.4    5. Systolic CHF, chronic (HCC) I50.22 428.22      428.0         Orders Placed This Encounter    gentamicin (GARAMYCIN) 0.1 % topical ointment       Plan:     CHF compensated. No angina. No rapid palpitations. Appears well compensated. BP has been fine. LDL at target. F/U 6 mo  Bharat Novak MD    Pursuant to the emergency declaration under the [facility] waiver authority and the [employer] Act, this Virtual Visit was conducted, with patient's consent, to reduce the patient's risk of exposure to COVID-19 and provide continuity of care for an established patient. Services were provided through a video synchronous discussion virtually to substitute for in-person clinic visit.

## 2020-04-17 RX ORDER — TAMSULOSIN HYDROCHLORIDE 0.4 MG/1
CAPSULE ORAL
Qty: 90 CAP | Refills: 0 | Status: SHIPPED | OUTPATIENT
Start: 2020-04-17 | End: 2020-09-06

## 2020-04-23 DIAGNOSIS — I42.9 CARDIOMYOPATHY, UNSPECIFIED TYPE (HCC): ICD-10-CM

## 2020-04-24 RX ORDER — CARVEDILOL 25 MG/1
TABLET ORAL
Qty: 60 TAB | Refills: 0 | Status: SHIPPED | OUTPATIENT
Start: 2020-04-24 | End: 2020-06-16

## 2020-04-27 ENCOUNTER — VIRTUAL VISIT (OUTPATIENT)
Dept: FAMILY MEDICINE CLINIC | Age: 70
End: 2020-04-27

## 2020-04-27 DIAGNOSIS — E78.2 MIXED HYPERLIPIDEMIA: ICD-10-CM

## 2020-04-27 DIAGNOSIS — I48.0 PAROXYSMAL ATRIAL FIBRILLATION (HCC): ICD-10-CM

## 2020-04-27 DIAGNOSIS — Z95.5 S/P CORONARY ARTERY STENT PLACEMENT: ICD-10-CM

## 2020-04-27 DIAGNOSIS — I50.32 CHRONIC DIASTOLIC HF (HEART FAILURE) (HCC): ICD-10-CM

## 2020-04-27 DIAGNOSIS — R35.1 NOCTURIA: ICD-10-CM

## 2020-04-27 DIAGNOSIS — I42.9 CARDIOMYOPATHY, UNSPECIFIED TYPE (HCC): ICD-10-CM

## 2020-04-27 DIAGNOSIS — I10 ESSENTIAL HYPERTENSION, BENIGN: Primary | ICD-10-CM

## 2020-04-27 NOTE — PROGRESS NOTES
Chief Complaint   Patient presents with    Hypertension     F/U on BP.  Cholesterol Problem     F/U on cholesterol. 1. Have you been to the ER, urgent care clinic since your last visit? Hospitalized since your last visit? No    2. Have you seen or consulted any other health care providers outside of the [address] [place] [address] since your last visit? Include any pap smears or colon screening.  No

## 2020-04-27 NOTE — PROGRESS NOTES
HISTORY OF PRESENT ILLNESS  Patient encounter by synchronous (real time) audio technology which is patient initiated. The Patient is aware that this encounter is a billable service with coverage determined by their insurance carrier,as discussed at the time of check in. The patient has given verbal consent to proceed    Jacob Osman is a 69 y.o. male. Telephonic/Virtual visit for f/u hbp,icm,cholpaf,a/c on eliquis. Doing well,remarkably few c/o. Working from home part of time,at [employer] part of time. Denies chest pain fatigue orthopnea,leg edema. No bleeding problems    Hypertension    The history is provided by the patient. This is a chronic problem. The problem has not changed since onset. Pertinent negatives include no chest pain, no orthopnea, no PND, no malaise/fatigue, no headaches, no peripheral edema, no dizziness and no shortness of breath. Cholesterol Problem   The history is provided by the patient. This is a chronic problem. The problem occurs daily. The problem has not changed since onset. Pertinent negatives include no chest pain, no headaches and no shortness of breath. Medication Evaluation   The history is provided by the patient. This is a chronic problem. The problem has not changed since onset. Pertinent negatives include no chest pain, no headaches and no shortness of breath. Review of Systems   Constitutional: Negative for fever, malaise/fatigue and weight loss. Respiratory: Negative for cough and shortness of breath. Cardiovascular: Negative for chest pain, orthopnea and PND. Gastrointestinal: Negative for blood in stool and melena. Musculoskeletal: Negative for myalgias. Neurological: Negative for dizziness and headaches. Endo/Heme/Allergies: Does not bruise/bleed easily. Psychiatric/Behavioral: The patient is not nervous/anxious. Physical Exam deferred    ASSESSMENT and PLAN  Diagnoses and all orders for this visit:    1.  Essential hypertension, benign,controlled    2. Cardiomyopathy, unspecified type ([facility]    3. S/P coronary artery stent placement    4. Chronic diastolic HF (heart failure) (HCC),compensated,asymptomatic    5. Mixed hyperlipidemia    6. Paroxysmal atrial fibrillation (HCC)    7. Nocturia    Doing well,continue current meds and treatments,rtc 3 mp      Due to this being a telehealth encounter (During  [address] [facility]),evaluation of the following organ systems was limited:Vitals/Constitutional/EENT,Respiratory,CV,GI,,MS,Neuro,Skin /Heme-Lymph-Imm  Pursuant to the emergency declaration under the [address] [place] [address] waiver authority and the [employer] [facility] Appropriations Act,this Virtual Visit was conducted ,with patient consent,to reduce the patients risk of exposure to Covid 19 and to provide continuity of care  for an established patient. Services were provided through a video synchronous discussion virtually to substitute for in person appointment. This visit was completed using Doxy. me    Time in Virtual Visit:  15  minutes

## 2020-04-29 ENCOUNTER — HOSPITAL ENCOUNTER (OUTPATIENT)
Dept: LAB | Age: 70
Discharge: HOME | End: 2020-04-29
Payer: MEDICARE

## 2020-04-29 PROCEDURE — 36415 COLL VENOUS BLD VENIPUNCTURE: CPT

## 2020-04-29 PROCEDURE — 85025 COMPLETE CBC W/AUTO DIFF WBC: CPT

## 2020-04-29 PROCEDURE — 84153 ASSAY OF PSA TOTAL: CPT

## 2020-04-29 PROCEDURE — 80061 LIPID PANEL: CPT

## 2020-04-29 PROCEDURE — 80053 COMPREHEN METABOLIC PANEL: CPT

## 2020-04-30 LAB
ALBUMIN SERPL-MCNC: 4.2 G/DL (ref 3.7–4.7)
ALBUMIN/GLOB SERPL: 2.5 {RATIO} (ref 1.2–2.2)
ALP SERPL-CCNC: 41 IU/L (ref 39–117)
ALT SERPL-CCNC: 32 IU/L (ref 0–44)
AST SERPL-CCNC: 29 IU/L (ref 0–40)
BASOPHILS # BLD AUTO: 0 X10E3/UL (ref 0–0.2)
BASOPHILS NFR BLD AUTO: 1 %
BILIRUB SERPL-MCNC: 1.2 MG/DL (ref 0–1.2)
BUN SERPL-MCNC: 14 MG/DL (ref 8–27)
BUN/CREAT SERPL: 10 (ref 10–24)
CALCIUM SERPL-MCNC: 9.3 MG/DL (ref 8.6–10.2)
CHLORIDE SERPL-SCNC: 105 MMOL/L (ref 96–106)
CHOLEST SERPL-MCNC: 127 MG/DL (ref 100–199)
CO2 SERPL-SCNC: 19 MMOL/L (ref 20–29)
CREAT SERPL-MCNC: 1.4 MG/DL (ref 0.76–1.27)
EOSINOPHIL # BLD AUTO: 0.2 X10E3/UL (ref 0–0.4)
EOSINOPHIL NFR BLD AUTO: 3 %
ERYTHROCYTE [DISTWIDTH] IN BLOOD BY AUTOMATED COUNT: 12.6 % (ref 11.6–15.4)
GLOBULIN SER CALC-MCNC: 1.7 G/DL (ref 1.5–4.5)
GLUCOSE SERPL-MCNC: 105 MG/DL (ref 65–99)
HCT VFR BLD AUTO: 42.8 % (ref 37.5–51)
HDLC SERPL-MCNC: 49 MG/DL
HGB BLD-MCNC: 14.6 G/DL (ref 13–17.7)
IMM GRANULOCYTES # BLD AUTO: 0 X10E3/UL (ref 0–0.1)
IMM GRANULOCYTES NFR BLD AUTO: 0 %
INTERPRETATION, 910389: NORMAL
INTERPRETATION: NORMAL
LDLC SERPL CALC-MCNC: 62 MG/DL (ref 0–99)
LYMPHOCYTES # BLD AUTO: 1.2 X10E3/UL (ref 0.7–3.1)
LYMPHOCYTES NFR BLD AUTO: 23 %
MCH RBC QN AUTO: 32.1 PG (ref 26.6–33)
MCHC RBC AUTO-ENTMCNC: 34.1 G/DL (ref 31.5–35.7)
MCV RBC AUTO: 94 FL (ref 79–97)
MONOCYTES # BLD AUTO: 0.5 X10E3/UL (ref 0.1–0.9)
MONOCYTES NFR BLD AUTO: 10 %
NEUTROPHILS # BLD AUTO: 3.3 X10E3/UL (ref 1.4–7)
NEUTROPHILS NFR BLD AUTO: 63 %
PDF IMAGE, 910387: NORMAL
PLATELET # BLD AUTO: 141 X10E3/UL (ref 150–450)
POTASSIUM SERPL-SCNC: 4.3 MMOL/L (ref 3.5–5.2)
PROT SERPL-MCNC: 5.9 G/DL (ref 6–8.5)
PSA SERPL-MCNC: 3 NG/ML (ref 0–4)
RBC # BLD AUTO: 4.55 X10E6/UL (ref 4.14–5.8)
SODIUM SERPL-SCNC: 142 MMOL/L (ref 134–144)
TRIGL SERPL-MCNC: 81 MG/DL (ref 0–149)
VLDLC SERPL CALC-MCNC: 16 MG/DL (ref 5–40)
WBC # BLD AUTO: 5.2 X10E3/UL (ref 3.4–10.8)

## 2020-06-16 DIAGNOSIS — I42.9 CARDIOMYOPATHY, UNSPECIFIED TYPE (HCC): ICD-10-CM

## 2020-06-16 RX ORDER — CARVEDILOL 25 MG/1
TABLET ORAL
Qty: 60 TAB | Refills: 0 | Status: SHIPPED | OUTPATIENT
Start: 2020-06-16 | End: 2020-08-20

## 2020-07-01 ENCOUNTER — CLINICAL SUPPORT (OUTPATIENT)
Dept: CARDIOLOGY CLINIC | Age: 70
End: 2020-07-01

## 2020-07-01 DIAGNOSIS — Z95.810 BIVENTRICULAR ICD (IMPLANTABLE CARDIOVERTER-DEFIBRILLATOR) IN PLACE: Primary | ICD-10-CM

## 2020-07-01 DIAGNOSIS — I42.9 CARDIOMYOPATHY, UNSPECIFIED TYPE (HCC): ICD-10-CM

## 2020-08-10 DIAGNOSIS — H10.9 CONJUNCTIVITIS OF RIGHT EYE, UNSPECIFIED CONJUNCTIVITIS TYPE: ICD-10-CM

## 2020-08-11 RX ORDER — ERYTHROMYCIN 5 MG/G
OINTMENT OPHTHALMIC
Qty: 3.5 G | Refills: 0 | Status: SHIPPED | OUTPATIENT
Start: 2020-08-11 | End: 2020-09-30

## 2020-08-20 DIAGNOSIS — I42.9 CARDIOMYOPATHY, UNSPECIFIED TYPE (HCC): ICD-10-CM

## 2020-08-20 RX ORDER — CARVEDILOL 25 MG/1
TABLET ORAL
Qty: 60 TAB | Refills: 3 | Status: SHIPPED | OUTPATIENT
Start: 2020-08-20 | End: 2020-12-29 | Stop reason: SDUPTHER

## 2020-08-21 RX ORDER — EZETIMIBE 10 MG/1
TABLET ORAL
Qty: 90 TAB | Refills: 0 | Status: SHIPPED | OUTPATIENT
Start: 2020-08-21 | End: 2021-03-01

## 2020-09-29 DIAGNOSIS — H10.9 CONJUNCTIVITIS OF RIGHT EYE, UNSPECIFIED CONJUNCTIVITIS TYPE: ICD-10-CM

## 2020-09-30 RX ORDER — ERYTHROMYCIN 5 MG/G
OINTMENT OPHTHALMIC
Qty: 3.5 G | Refills: 0 | Status: SHIPPED | OUTPATIENT
Start: 2020-09-30 | End: 2020-10-30

## 2020-10-07 ENCOUNTER — OFFICE VISIT (OUTPATIENT)
Dept: CARDIOLOGY CLINIC | Age: 70
End: 2020-10-07
Payer: MEDICARE

## 2020-10-07 VITALS
HEART RATE: 61 BPM | SYSTOLIC BLOOD PRESSURE: 102 MMHG | RESPIRATION RATE: 18 BRPM | HEIGHT: 68 IN | BODY MASS INDEX: 36.22 KG/M2 | OXYGEN SATURATION: 97 % | WEIGHT: 239 LBS | DIASTOLIC BLOOD PRESSURE: 74 MMHG

## 2020-10-07 DIAGNOSIS — I50.22 SYSTOLIC CHF, CHRONIC (HCC): ICD-10-CM

## 2020-10-07 DIAGNOSIS — E78.2 MIXED HYPERLIPIDEMIA: ICD-10-CM

## 2020-10-07 DIAGNOSIS — Z95.810 BIVENTRICULAR ICD (IMPLANTABLE CARDIOVERTER-DEFIBRILLATOR) IN PLACE: ICD-10-CM

## 2020-10-07 DIAGNOSIS — I48.0 PAROXYSMAL ATRIAL FIBRILLATION (HCC): Primary | ICD-10-CM

## 2020-10-07 DIAGNOSIS — I25.10 CORONARY ARTERY DISEASE INVOLVING NATIVE CORONARY ARTERY OF NATIVE HEART WITHOUT ANGINA PECTORIS: ICD-10-CM

## 2020-10-07 DIAGNOSIS — I10 ESSENTIAL HYPERTENSION, BENIGN: ICD-10-CM

## 2020-10-07 PROCEDURE — G8536 NO DOC ELDER MAL SCRN: HCPCS | Performed by: INTERNAL MEDICINE

## 2020-10-07 PROCEDURE — G8752 SYS BP LESS 140: HCPCS | Performed by: INTERNAL MEDICINE

## 2020-10-07 PROCEDURE — G0463 HOSPITAL OUTPT CLINIC VISIT: HCPCS | Performed by: INTERNAL MEDICINE

## 2020-10-07 PROCEDURE — G8432 DEP SCR NOT DOC, RNG: HCPCS | Performed by: INTERNAL MEDICINE

## 2020-10-07 PROCEDURE — G8754 DIAS BP LESS 90: HCPCS | Performed by: INTERNAL MEDICINE

## 2020-10-07 PROCEDURE — G8417 CALC BMI ABV UP PARAM F/U: HCPCS | Performed by: INTERNAL MEDICINE

## 2020-10-07 PROCEDURE — 3017F COLORECTAL CA SCREEN DOC REV: CPT | Performed by: INTERNAL MEDICINE

## 2020-10-07 PROCEDURE — 93005 ELECTROCARDIOGRAM TRACING: CPT | Performed by: INTERNAL MEDICINE

## 2020-10-07 PROCEDURE — G8427 DOCREV CUR MEDS BY ELIG CLIN: HCPCS | Performed by: INTERNAL MEDICINE

## 2020-10-07 PROCEDURE — 1101F PT FALLS ASSESS-DOCD LE1/YR: CPT | Performed by: INTERNAL MEDICINE

## 2020-10-07 PROCEDURE — 99214 OFFICE O/P EST MOD 30 MIN: CPT | Performed by: INTERNAL MEDICINE

## 2020-10-07 PROCEDURE — 93010 ELECTROCARDIOGRAM REPORT: CPT | Performed by: INTERNAL MEDICINE

## 2020-10-07 NOTE — PROGRESS NOTES
[address]  [phone]     Subjective:      Sapphire Shaffer is a 69 y.o. male is here for routine f/u. He has a PMHx of CAD, ischemic cardiomyopathy now resolved s/p BiV ICD, PAF, HTN and HLD. Last seen by us via VV 4/2020. He remains in usual state of health. Continues to work full time, constantly walking at work. His R leg cellulitis has greatly improved, managed by Dr Daryl Nj. The patient denies chest pain/ shortness of breath, orthopnea, PND, LE edema, palpitations, syncope, or presyncope. Patient Active Problem List    Diagnosis Date Noted    [facility]) 08/10/2018    Presence of biventricular AICD 08/10/2018    Abnormal stress ECG 08/10/2018    VT (ventricular tachycardia) (HCC) 08/10/2018    Chronic diastolic HF (heart failure) ([address] 15/97/8500    Systolic CHF, chronic ([address] 08/10/2018    S/P coronary artery stent placement 08/10/2018    VF (ventricular fibrillation) ([address] 08/10/2018    Severe obesity (BMI 35.0-39. 9) with comorbidity ([address] 04/19/2018    Low back pain 07/02/2013    Automatic implantable cardioverter-defibrillator in situ 06/11/2012    GERD (gastroesophageal reflux disease) 05/14/2012    Hematochezia 05/14/2012    Screen for colon cancer 05/14/2012    CAD (coronary artery disease) 02/16/2011    Mixed hyperlipidemia 02/16/2011    Cardiomyopathy ([address] 02/16/2011    Esophageal reflux 02/16/2011    Encounter for long-term (current) use of other medications 02/16/2011    Nocturia 02/16/2011    Abnormal PSA 02/16/2011    Essential hypertension, benign 02/16/2011      Cindy Mike MD  Past Medical History:   Diagnosis Date    [facility]) 8/10/2018    Abnormal PSA 2/16/2011    Abnormal stress ECG 8/10/2018    CAD (coronary artery disease) 2/16/2011    Cardiomyopathy ([address] 2/16/2011    Chronic diastolic HF (heart failure) ([address] 8/10/2018    Encounter for long-term (current) use of other medications 2/16/2011    Esophageal reflux 2/16/2011    Essential hypertension, benign 2/16/2011    GERD (gastroesophageal reflux disease) 5/14/2012    Heart attack ([facility]     Low back pain 7/2/2013    Mixed hyperlipidemia 2/16/2011    Nocturia 2/16/2011    Pacemaker     Presence of biventricular AICD 8/10/2018    [employer] BIVICD implanted 5/2017    S/P coronary artery stent placement 8/10/2018    8/9/18 PCI/SUREKHA to LAD, RCA x 2    Systolic CHF, chronic (HCC) 8/10/2018    VF (ventricular fibrillation) ([facility] 8/10/2018    [facility] 8/10/2018      Past Surgical History:   Procedure Laterality Date    HX CERVICAL FUSION  1997    c4,5,6    HX PACEMAKER      NH COLONOSCOPY FLX DX W/COLLJ SPEC WHEN PFRMD  5/14/2012         NH EGD TRANSORAL BIOPSY SINGLE/MULTIPLE  5/14/2012         REMOVAL GALLBLADDER       No Known Allergies   Family History   Problem Relation Age of Onset    Lung Disease Mother     Cancer Mother     Alcohol abuse Father     Heart Disease Father       Social History     Socioeconomic History    Marital status:      Spouse name: Not on file    Number of children: Not on file    Years of education: Not on file    Highest education level: Not on file   Occupational History    Not on file   Social Needs    Financial resource strain: Not on file    Food insecurity     Worry: Not on file     Inability: Not on file    Transportation needs     Medical: Not on file     Non-medical: Not on file   Tobacco Use    Smoking status: Never Smoker    Smokeless tobacco: Never Used   Substance and Sexual Activity    Alcohol use: No    Drug use: No    Sexual activity: Not on file   Lifestyle    Physical activity     Days per week: Not on file     Minutes per session: Not on file    Stress: Not on file   Relationships    Social connections     Talks on phone: Not on file     Gets together: Not on file     Attends Synagogue service: Not on file Active member of club or organization: Not on file     Attends meetings of clubs or organizations: Not on file     Relationship status: Not on file    Intimate partner violence     Fear of current or ex partner: Not on file     Emotionally abused: Not on file     Physically abused: Not on file     Forced sexual activity: Not on file   Other Topics Concern    Not on file   Social History Narrative    Not on file      Current Outpatient Medications   Medication Sig    erythromycin (ILOTYCIN) ophthalmic ointment APPLY 1/2-INCH STRIP TO THE RIGHT EYE THREE TIMES DAILY FOR 10 DAYS    tamsulosin (FLOMAX) 0.4 mg capsule TAKE 1 CAPSULE EVERY DAY    ezetimibe (ZETIA) 10 mg tablet TAKE 1 TABLET BY MOUTH EVERY DAY    carvediloL (COREG) 25 mg tablet TAKE 1 TABLET BY MOUTH TWO (2) TIMES DAILY (WITH MEALS).  Eliquis 5 mg tablet TAKE 1 TABLET BY MOUTH TWO (2) TIMES A DAY. FOR BLOOD THINNER    gentamicin (GARAMYCIN) 0.1 % topical ointment     furosemide (LASIX) 20 mg tablet T1T QAM FOR FLUID    aspirin delayed-release 81 mg tablet Take  by mouth daily.  rosuvastatin (CRESTOR) 20 mg tablet Take 1 Tab by mouth daily. HALF TAB DAILY    cyclobenzaprine (FLEXERIL) 10 mg tablet Take 1 Tab by mouth three (3) times daily as needed for Muscle Spasm(s).  LEVITRA 20 mg tablet Take 20 mg by mouth as needed.  captopril (CAPOTEN) 12.5 mg tablet Take 1 Tab by mouth two (2) times a day. (Patient taking differently: Take 12.5 mg by mouth two (2) times a day. Indications: HALF TAB BID)    nitroglycerin (NITROLINGUAL) 400 mcg/spray spray 1 Spray by SubLINGual route every five (5) minutes as needed.  cyanocobalamin 1,000 mcg tablet Take 1,000 mcg by mouth daily.  omega-3-dha-epa-dpa-fish oil 1,050-1,200 mg cap Take 1 Cap by mouth two (2) times a day.  cholecalciferol, vitamin d3, (VITAMIN D) 1,000 unit tablet Take  by mouth daily.  multivitamins-minerals-lutein (CENTRUM SILVER) Tab Take 1 Tab by mouth daily.      No current facility-administered medications for this visit. Review of Symptoms:  11 systems reviewed, negative other than as stated in the HPI    Physical ExamPhysical Exam:    Vitals:    10/07/20 1107   BP: 102/74   Pulse: 61   Resp: 18   SpO2: 97%   Weight: 239 lb (108.4 kg)   Height: 5' 8\" (1.727 m)     Body mass index is 36.34 kg/m². General PE  Gen:  NAD  Mental Status - Alert. General Appearance - Not in acute distress. HEENT:  PERRL, no carotid bruits or JVD  Chest and Lung Exam   Inspection: Accessory muscles - No use of accessory muscles in breathing. Auscultation:   Breath sounds: - Normal.   Cardiovascular   Inspection: Jugular vein - Bilateral - Inspection Normal.   Palpation/Percussion:   Apical Impulse: - Normal.   Auscultation: Rhythm - Regular. Heart Sounds - S1 WNL and S2 WNL. No S3 or S4. Murmurs & Other Heart Sounds: Auscultation of the heart reveals - No Murmurs. Peripheral Vascular   Upper Extremity: Inspection - Bilateral - No Cyanotic nailbeds or Digital clubbing. Lower Extremity:   Palpation: Edema - Bilateral - No edema. Abdomen:   Soft, non-tender, bowel sounds are active.   Neuro: A&O times 3, CN and motor grossly WNL    Labs:   Lab Results   Component Value Date/Time    Cholesterol, total 127 04/29/2020 08:12 AM    Cholesterol, total 138 02/26/2019 02:24 PM    Cholesterol, total 111 08/15/2018 02:53 PM    Cholesterol, total 128 07/16/2015 11:18 AM    Cholesterol, total 123 08/25/2014 08:56 AM    HDL Cholesterol 49 04/29/2020 08:12 AM    HDL Cholesterol 52 02/26/2019 02:24 PM    HDL Cholesterol 52 07/16/2015 11:18 AM    HDL Cholesterol 40 08/25/2014 08:56 AM    HDL Cholesterol 48 02/11/2014 08:16 AM    LDL, calculated 62 04/29/2020 08:12 AM    LDL, calculated 68 02/26/2019 02:24 PM    LDL, calculated 59 07/16/2015 11:18 AM    LDL, calculated 63 08/25/2014 08:56 AM    LDL, calculated 68 02/11/2014 08:16 AM    Triglyceride 81 04/29/2020 08:12 AM    Triglyceride 92 02/26/2019 02:24 PM    Triglyceride 87 07/16/2015 11:18 AM    Triglyceride 101 08/25/2014 08:56 AM    Triglyceride 78 02/11/2014 08:16 AM     No results found for: CPK, CPKX, CPX  Lab Results   Component Value Date/Time    Sodium 142 04/29/2020 08:12 AM    Potassium 4.3 04/29/2020 08:12 AM    Chloride 105 04/29/2020 08:12 AM    CO2 19 (L) 04/29/2020 08:12 AM    Anion gap 7 08/10/2018 04:41 AM    Glucose 105 (H) 04/29/2020 08:12 AM    BUN 14 04/29/2020 08:12 AM    Creatinine 1.40 (H) 04/29/2020 08:12 AM    BUN/Creatinine ratio 10 04/29/2020 08:12 AM    GFR est AA 58 (L) 04/29/2020 08:12 AM    GFR est non-AA 50 (L) 04/29/2020 08:12 AM    Calcium 9.3 04/29/2020 08:12 AM    Bilirubin, total 1.2 04/29/2020 08:12 AM    Alk. phosphatase 41 04/29/2020 08:12 AM    Protein, total 5.9 (L) 04/29/2020 08:12 AM    Albumin 4.2 04/29/2020 08:12 AM    Globulin 2.3 07/29/2010 02:23 PM    A-G Ratio 2.5 (H) 04/29/2020 08:12 AM    ALT (SGPT) 32 04/29/2020 08:12 AM       EKG:  Paced     Assessment:     Assessment:      1. Paroxysmal atrial fibrillation (HCC)    2. Coronary artery disease involving native coronary artery of native heart without angina pectoris    3. Biventricular ICD (implantable cardioverter-defibrillator) in place    4. Systolic CHF, chronic ([address]    5. Essential hypertension, benign    6. Mixed hyperlipidemia        Orders Placed This Encounter    AMB POC EKG ROUTINE W/ 12 LEADS, INTER & REP     Order Specific Question:   Reason for Exam:     Answer:   routine        Plan:     Coronary artery disease involving native coronary artery of native heart without angina pectoris  S/p SUERKHA to the dRCA and mLAD in 8/2018.   Stable; denies anginal or anginal equivalent symptoms  Continue medical management with BB, ASA and statin therapy      Systolic CHF, chronic; BiV-ICD, resolved  Echo in 9/2017 with preserved ejection fraction 55% with grade 1 DD with mild MR  Euvolemic on exam today  NYHA FC 1  Continue with captopril, coreg and Lasix    Paroxysmal atrial fibrillation   Device followed by Dr Jami Salazar asymptomatic; continue with Eliquis therapy and Coreg     Mixed hyperlipidemia  4/2020 LDL 62 On statin Labs and lipids per PCP       Essential hypertension, benign  BP controlled.   Continue anti-hypertensive therapy and low sodium diet       F/u with Dr. Arlyn Mireles in 6 months      Sriram Celaya MD

## 2020-10-07 NOTE — LETTER
10/7/20 Patient: Janeth Cornejo YOB: 1950 Date of Visit: 10/7/2020 Nora Baron MD 
[address] [place] [address] VIA In Basket Dear Nora Baron MD, Thank you for referring Mr. Magalis Leblanc to [address] [place] [address] for evaluation. My notes for this consultation are attached. If you have questions, please do not hesitate to call me. I look forward to following your patient along with you.  
 
 
Sincerely, 
 
Josep Balderas MD

## 2020-10-07 NOTE — PROGRESS NOTES
1. Have you been to the ER, urgent care clinic since your last visit? Hospitalized since your last visit? No.    2. Have you seen or consulted any other health care providers outside of the [address] [place] [address] since your last visit? Include any pap smears or colon screening. Seen by Dermatology.       Chief Complaint   Patient presents with    Follow-up     6 month-  pt denies any cardiac symptoms

## 2020-10-16 RX ORDER — ROSUVASTATIN CALCIUM 20 MG/1
TABLET, COATED ORAL
Qty: 90 TAB | Refills: 1 | Status: SHIPPED | OUTPATIENT
Start: 2020-10-16 | End: 2021-03-01

## 2020-10-29 DIAGNOSIS — H10.9 CONJUNCTIVITIS OF RIGHT EYE, UNSPECIFIED CONJUNCTIVITIS TYPE: ICD-10-CM

## 2020-10-30 RX ORDER — ERYTHROMYCIN 5 MG/G
OINTMENT OPHTHALMIC
Qty: 3.5 G | Refills: 0 | Status: SHIPPED | OUTPATIENT
Start: 2020-10-30 | End: 2020-12-01

## 2020-11-03 NOTE — PROGRESS NOTES
ELECTROPHYSIOLOGY        Subjective:      Nirav Escalera is a 69 y.o. male is here for EP follow up. The patient denies chest pain/ shortness of breath, orthopnea, PND, LE edema, palpitations, syncope, presyncope or fatigue. Is planning on eye lid surgery/reconstruction - no date yet. Will need to hold [facility] and ASA. Nirav Escalera  is on [facility], reports no melena, hematuria, or obvious signs of bleeding. No falls. Endorses compliance with eliquis. Patient Active Problem List    Diagnosis Date Noted    [facility]) 08/10/2018    Presence of biventricular AICD 08/10/2018    Abnormal stress ECG 08/10/2018    VT (ventricular tachycardia) ([facility]) 08/10/2018    Chronic diastolic HF (heart failure) ([address] 58/61/0826    Systolic CHF, chronic ([address] 08/10/2018    S/P coronary artery stent placement 08/10/2018    VF (ventricular fibrillation) ([address] 08/10/2018    Severe obesity (BMI 35.0-39. 9) with comorbidity ([address] 04/19/2018    Low back pain 07/02/2013    Automatic implantable cardioverter-defibrillator in situ 06/11/2012    GERD (gastroesophageal reflux disease) 05/14/2012    Hematochezia 05/14/2012    Screen for colon cancer 05/14/2012    CAD (coronary artery disease) 02/16/2011    Mixed hyperlipidemia 02/16/2011    Cardiomyopathy ([address] 02/16/2011    Esophageal reflux 02/16/2011    Encounter for long-term (current) use of other medications 02/16/2011    Nocturia 02/16/2011    Abnormal PSA 02/16/2011    Essential hypertension, benign 02/16/2011      Stephanie Durbin MD  Past Medical History:   Diagnosis Date    [facility]) 8/10/2018    Abnormal PSA 2/16/2011    Abnormal stress ECG 8/10/2018    CAD (coronary artery disease) 2/16/2011    Cardiomyopathy ([address] 2/16/2011    Chronic diastolic HF (heart failure) ([address] 8/10/2018    Encounter for long-term (current) use of other medications 2/16/2011    Esophageal reflux 2/16/2011    Essential hypertension, benign 2/16/2011    GERD (gastroesophageal reflux disease) 5/14/2012    [facility]     Low back pain 7/2/2013    Mixed hyperlipidemia 2/16/2011    Nocturia 2/16/2011    Pacemaker     Presence of biventricular AICD 8/10/2018    [employer] BIVICD implanted 5/2017    S/P coronary artery stent placement 8/10/2018    8/9/18 PCI/SUREKHA to LAD, RCA x 2    Systolic CHF, chronic (HCC) 8/10/2018    VF (ventricular fibrillation) ([facility] 8/10/2018    [facility]cardia) ([facility] 8/10/2018      Past Surgical History:   Procedure Laterality Date    HX CERVICAL FUSION  1997    c4,5,6    HX PACEMAKER      [place] COLONOSCOPY FLX DX W/COLLJ SPEC WHEN PFRMD  5/14/2012         RI EGD TRANSORAL BIOPSY SINGLE/MULTIPLE  5/14/2012         REMOVAL GALLBLADDER       No Known Allergies   Family History   Problem Relation Age of Onset    Lung Disease Mother     Cancer Mother     Alcohol abuse Father     Heart Disease Father     negative for cardiac disease  Social History     Socioeconomic History    Marital status:      Spouse name: Not on file    Number of children: Not on file    Years of education: Not on file    Highest education level: Not on file   Tobacco Use    Smoking status: Never Smoker    Smokeless tobacco: Never Used   Substance and Sexual Activity    Alcohol use: No    Drug use: No     Current Outpatient Medications   Medication Sig    amiodarone (CORDARONE) 200 mg tablet Take 1 Tab by mouth daily.     erythromycin (ILOTYCIN) ophthalmic ointment APPLY 1/2-INCH STRIP TO THE RIGHT EYE THREE TIMES DAILY FOR 10 DAYS    rosuvastatin (CRESTOR) 20 mg tablet TAKE 1 TABLET BY MOUTH DAILY FOR CHOLESTEROL (Patient taking differently: 1/2 tab daily)    furosemide (LASIX) 20 mg tablet TAKE 1 TABLET EVERY MORNING FOR FLUID    tamsulosin (FLOMAX) 0.4 mg capsule TAKE 1 CAPSULE EVERY DAY    ezetimibe (ZETIA) 10 mg tablet TAKE 1 TABLET BY MOUTH EVERY DAY    carvediloL (COREG) 25 mg tablet TAKE 1 TABLET BY MOUTH TWO (2) TIMES DAILY (WITH MEALS).  Eliquis 5 mg tablet TAKE 1 TABLET BY MOUTH TWO (2) TIMES A DAY. FOR BLOOD THINNER    gentamicin (GARAMYCIN) 0.1 % topical ointment     aspirin delayed-release 81 mg tablet Take  by mouth daily.  cyclobenzaprine (FLEXERIL) 10 mg tablet Take 1 Tab by mouth three (3) times daily as needed for Muscle Spasm(s).  captopril (CAPOTEN) 12.5 mg tablet Take 1 Tab by mouth two (2) times a day. (Patient taking differently: Take 12.5 mg by mouth two (2) times a day. Indications: HALF TAB BID)    nitroglycerin (NITROLINGUAL) 400 mcg/spray spray 1 Spray by SubLINGual route every five (5) minutes as needed.  cyanocobalamin 1,000 mcg tablet Take 1,000 mcg by mouth daily.  omega-3-dha-epa-dpa-fish oil 1,050-1,200 mg cap Take 1 Cap by mouth two (2) times a day.  cholecalciferol, vitamin d3, (VITAMIN D) 1,000 unit tablet Take  by mouth daily.  multivitamins-minerals-lutein (CENTRUM SILVER) Tab Take 1 Tab by mouth daily. No current facility-administered medications for this visit. Vitals:    11/04/20 0845   BP: 110/72   Pulse: 70   Resp: 18   SpO2: 97%   Weight: 242 lb (109.8 kg)   Height: 5' 8\" (1.727 m)       I have reviewed the nurses notes, vitals, problem list, allergy list, medical history, family, social history and medications. Review of Symptoms:    General: Pt denies excessive weight gain or loss. Pt is able to conduct ADL's  HEENT: Denies blurred vision, headaches, epistaxis and difficulty swallowing. Respiratory: Denies shortness of breath, WING, wheezing or stridor. Cardiovascular: Denies precordial pain, palpitations, reports edema    Gastrointestinal: Denies poor appetite, indigestion, abdominal pain or blood in stool  Urinary: Denies dysuria, pyuria  Musculoskeletal: Denies pain or swelling from muscles or joints  Neurologic: Denies tremor, paresthesias, or sensory motor disturbance  Skin: Denies rash, itching or texture change.   Psych: Denies depression      Physical Exam:      General: Well developed, in no acute distress. HEENT: Eyes - PERRL, no jvd  Heart:  Normal S1/S2 negative S3 or S4. Regular, no murmur, gallop or rub. Respiratory: Clear bilaterally x 4, no wheezing or rales  Extremities:  + edema, normal cap refill, no cyanosis. Musculoskeletal: No clubbing  Neuro: A&Ox3, speech clear, gait stable. Skin: Skin color is normal. No rashes or lesions.  Non diaphoretic. no ulcers  Vascular: 2+ pulses symmetric in all extremities  Psych - judgement intact and orientation is wnl       Cardiographics         Results for orders placed or performed during the hospital encounter of 05/06/19   EKG, 12 LEAD, INITIAL   Result Value Ref Range    Ventricular Rate 64 BPM    Atrial Rate 64 BPM    P-R Interval 204 ms    QRS Duration 146 ms    Q-T Interval 466 ms    QTC Calculation (Bezet) 480 ms    Calculated P Axis 16 degrees    Calculated R Axis -92 degrees    Calculated T Axis -19 degrees    Diagnosis       AV dual-paced rhythm with frequent ventricular-paced complexes  Biventricular pacemaker detected  When compared with ECG of 10-AUG-2018 04:36,  Previous ECG has undetermined rhythm, needs review  Confirmed by Lo Ragsdale ([id number]) on 5/6/2019 2:06:20 PM           Lab Results   Component Value Date/Time    WBC 5.2 04/29/2020 08:12 AM    HGB 14.6 04/29/2020 08:12 AM    HCT 42.8 04/29/2020 08:12 AM    PLATELET 351 (L) 81/81/6373 08:12 AM    MCV 94 04/29/2020 08:12 AM      Lab Results   Component Value Date/Time    Sodium 142 04/29/2020 08:12 AM    Potassium 4.3 04/29/2020 08:12 AM    Chloride 105 04/29/2020 08:12 AM    CO2 19 (L) 04/29/2020 08:12 AM    Anion gap 7 08/10/2018 04:41 AM    Glucose 105 (H) 04/29/2020 08:12 AM    BUN 14 04/29/2020 08:12 AM    Creatinine 1.40 (H) 04/29/2020 08:12 AM    BUN/Creatinine ratio 10 04/29/2020 08:12 AM    GFR est AA 58 (L) 04/29/2020 08:12 AM    GFR est non-AA 50 (L) 04/29/2020 08:12 AM    Calcium 9.3 04/29/2020 08:12 AM    Bilirubin, total 1.2 04/29/2020 08:12 AM    Alk. phosphatase 41 04/29/2020 08:12 AM    Protein, total 5.9 (L) 04/29/2020 08:12 AM    Albumin 4.2 04/29/2020 08:12 AM    Globulin 2.3 07/29/2010 02:23 PM    A-G Ratio 2.5 (H) 04/29/2020 08:12 AM    ALT (SGPT) 32 04/29/2020 08:12 AM      No results found for: TSH, TSH2, TSH3, TSHP, TSHEXT, TSHEXT        Assessment:           ICD-10-CM ICD-9-CM    1. Paroxysmal atrial fibrillation (HCC)  X15.4 084.15 METABOLIC PANEL, COMPREHENSIVE      MAGNESIUM      SLEEP MEDICINE REFERRAL   2. Systolic CHF, chronic (HCC)  G35.04 023.83 METABOLIC PANEL, COMPREHENSIVE     428.0 MAGNESIUM      SLEEP MEDICINE REFERRAL   3. Essential hypertension, benign  C54 614.2 METABOLIC PANEL, COMPREHENSIVE      MAGNESIUM      SLEEP MEDICINE REFERRAL   4. VT (ventricular tachycardia) (HCC)  U07.7 408.6 METABOLIC PANEL, COMPREHENSIVE      MAGNESIUM      SLEEP MEDICINE REFERRAL   5. Mixed hyperlipidemia  Q49.7 089.0 METABOLIC PANEL, COMPREHENSIVE      MAGNESIUM      SLEEP MEDICINE REFERRAL   6. Biventricular ICD (implantable cardioverter-defibrillator) in place  Z09.671 N02.77 METABOLIC PANEL, COMPREHENSIVE      MAGNESIUM      SLEEP MEDICINE REFERRAL     Orders Placed This Encounter    METABOLIC PANEL, COMPREHENSIVE    MAGNESIUM    IDRIS Rendon ref [facility]     Referral Priority:   Routine     Referral Type:   Consultation     Referral Reason:   Specialty Services Required     Referred to Provider:   Mario Ferreira MD     Requested Specialty:   Sleep Medicine     Number of Visits Requested:   1    amiodarone (CORDARONE) 200 mg tablet     Sig: Take 1 Tab by mouth daily. Dispense:  30 Tab     Refill:  1        Plan:     Mr. David Savage is here for annual device follow up. He is doing well and denies cardiac complaints. He's had significant improvement in his symptoms with upgrade and PTCA. EKG shows dual chamber pacing. Device interrogation demonstrates 36% AP and 93/96% BIV pacing.  Unfortunately he reverted to AF on 10/8 ( not on remote) with controlled V rates. He is tolerating Eliquis. Will plan for amiodarone and [address] and follow up with Dr Arron Ball for discussion of his AF treatment options. Pt informed NOT to take levitra while on amiodarone (QT prolongation). Pt verbalizes understanding and is in agreement with the plan. Will refer to sleep center for evaluation of TIMOTHY. Will need cardiac clearance from Dr Keyshawn Rosen if [place] is used in surgery.      Continue medical management for AF, HF, HTN, CHF. Thank you for allowing me to participate in [address] [facility] 's care.       Lety Salmeron, TRINA    [id number]

## 2020-11-04 ENCOUNTER — OFFICE VISIT (OUTPATIENT)
Dept: CARDIOLOGY CLINIC | Age: 70
End: 2020-11-04
Payer: MEDICARE

## 2020-11-04 ENCOUNTER — HOSPITAL ENCOUNTER (OUTPATIENT)
Dept: LAB | Age: 70
Discharge: HOME | End: 2020-11-04
Payer: MEDICARE

## 2020-11-04 ENCOUNTER — CLINICAL SUPPORT (OUTPATIENT)
Dept: CARDIOLOGY CLINIC | Age: 70
End: 2020-11-04
Payer: MEDICARE

## 2020-11-04 VITALS
OXYGEN SATURATION: 97 % | HEIGHT: 68 IN | SYSTOLIC BLOOD PRESSURE: 110 MMHG | DIASTOLIC BLOOD PRESSURE: 72 MMHG | WEIGHT: 242 LBS | RESPIRATION RATE: 18 BRPM | HEART RATE: 70 BPM | BODY MASS INDEX: 36.68 KG/M2

## 2020-11-04 DIAGNOSIS — I48.0 PAROXYSMAL ATRIAL FIBRILLATION (HCC): Primary | ICD-10-CM

## 2020-11-04 DIAGNOSIS — I47.20 VT (VENTRICULAR TACHYCARDIA): ICD-10-CM

## 2020-11-04 DIAGNOSIS — I10 ESSENTIAL HYPERTENSION, BENIGN: ICD-10-CM

## 2020-11-04 DIAGNOSIS — Z95.810 BIVENTRICULAR ICD (IMPLANTABLE CARDIOVERTER-DEFIBRILLATOR) IN PLACE: Primary | ICD-10-CM

## 2020-11-04 DIAGNOSIS — I50.22 SYSTOLIC CHF, CHRONIC (HCC): ICD-10-CM

## 2020-11-04 DIAGNOSIS — Z95.810 BIVENTRICULAR ICD (IMPLANTABLE CARDIOVERTER-DEFIBRILLATOR) IN PLACE: ICD-10-CM

## 2020-11-04 DIAGNOSIS — E78.2 MIXED HYPERLIPIDEMIA: ICD-10-CM

## 2020-11-04 DIAGNOSIS — I42.9 CARDIOMYOPATHY, UNSPECIFIED TYPE (HCC): ICD-10-CM

## 2020-11-04 PROCEDURE — G8432 DEP SCR NOT DOC, RNG: HCPCS | Performed by: NURSE PRACTITIONER

## 2020-11-04 PROCEDURE — 36415 COLL VENOUS BLD VENIPUNCTURE: CPT

## 2020-11-04 PROCEDURE — 93284 PRGRMG EVAL IMPLANTABLE DFB: CPT | Performed by: INTERNAL MEDICINE

## 2020-11-04 PROCEDURE — G8752 SYS BP LESS 140: HCPCS | Performed by: NURSE PRACTITIONER

## 2020-11-04 PROCEDURE — 99215 OFFICE O/P EST HI 40 MIN: CPT | Performed by: NURSE PRACTITIONER

## 2020-11-04 PROCEDURE — 83735 ASSAY OF MAGNESIUM: CPT

## 2020-11-04 PROCEDURE — G0463 HOSPITAL OUTPT CLINIC VISIT: HCPCS | Performed by: NURSE PRACTITIONER

## 2020-11-04 PROCEDURE — G8427 DOCREV CUR MEDS BY ELIG CLIN: HCPCS | Performed by: NURSE PRACTITIONER

## 2020-11-04 PROCEDURE — G8417 CALC BMI ABV UP PARAM F/U: HCPCS | Performed by: NURSE PRACTITIONER

## 2020-11-04 PROCEDURE — 1101F PT FALLS ASSESS-DOCD LE1/YR: CPT | Performed by: NURSE PRACTITIONER

## 2020-11-04 PROCEDURE — 3017F COLORECTAL CA SCREEN DOC REV: CPT | Performed by: NURSE PRACTITIONER

## 2020-11-04 PROCEDURE — G8754 DIAS BP LESS 90: HCPCS | Performed by: NURSE PRACTITIONER

## 2020-11-04 PROCEDURE — G8536 NO DOC ELDER MAL SCRN: HCPCS | Performed by: NURSE PRACTITIONER

## 2020-11-04 PROCEDURE — 80053 COMPREHEN METABOLIC PANEL: CPT

## 2020-11-04 RX ORDER — AMIODARONE HYDROCHLORIDE 200 MG/1
200 TABLET ORAL DAILY
Qty: 30 TAB | Refills: 1
Start: 2020-11-04 | End: 2021-01-29

## 2020-11-04 NOTE — PROGRESS NOTES
Chief Complaint   Patient presents with    Pacemaker Check     Annual follow up     Leg Swelling     little fabiano' lower leg swelling      1. Have you been to the ER, urgent care clinic since your last visit? Hospitalized since your last visit? No     2. Have you seen or consulted any other health care providers outside of the [address] [place] [address] since your last visit? Include any pap smears or colon screening.   Yes [facility] - does need eye surgery - needs to come off eliquis and asa - also can he use a statin ointment for eyes

## 2020-11-05 LAB
ALBUMIN SERPL-MCNC: 4.7 G/DL (ref 3.7–4.7)
ALBUMIN/GLOB SERPL: 3.1 {RATIO} (ref 1.2–2.2)
ALP SERPL-CCNC: 50 IU/L (ref 39–117)
ALT SERPL-CCNC: 27 IU/L (ref 0–44)
AST SERPL-CCNC: 23 IU/L (ref 0–40)
BILIRUB SERPL-MCNC: 1.5 MG/DL (ref 0–1.2)
BUN SERPL-MCNC: 13 MG/DL (ref 8–27)
BUN/CREAT SERPL: 10 (ref 10–24)
CALCIUM SERPL-MCNC: 9.4 MG/DL (ref 8.6–10.2)
CHLORIDE SERPL-SCNC: 107 MMOL/L (ref 96–106)
CO2 SERPL-SCNC: 20 MMOL/L (ref 20–29)
CREAT SERPL-MCNC: 1.3 MG/DL (ref 0.76–1.27)
GLOBULIN SER CALC-MCNC: 1.5 G/DL (ref 1.5–4.5)
GLUCOSE SERPL-MCNC: 95 MG/DL (ref 65–99)
INTERPRETATION: NORMAL
MAGNESIUM SERPL-MCNC: 2.1 MG/DL (ref 1.6–2.3)
POTASSIUM SERPL-SCNC: 4.2 MMOL/L (ref 3.5–5.2)
PROT SERPL-MCNC: 6.2 G/DL (ref 6–8.5)
SODIUM SERPL-SCNC: 141 MMOL/L (ref 134–144)

## 2020-11-12 ENCOUNTER — HOSPITAL ENCOUNTER (OUTPATIENT)
Dept: NON INVASIVE DIAGNOSTICS | Age: 70
Discharge: HOME | End: 2020-11-12
Attending: INTERNAL MEDICINE
Payer: MEDICARE

## 2020-11-12 VITALS
DIASTOLIC BLOOD PRESSURE: 86 MMHG | HEART RATE: 70 BPM | WEIGHT: 242 LBS | RESPIRATION RATE: 16 BRPM | SYSTOLIC BLOOD PRESSURE: 119 MMHG | HEIGHT: 68 IN | OXYGEN SATURATION: 96 % | BODY MASS INDEX: 36.68 KG/M2

## 2020-11-12 DIAGNOSIS — I48.91 ATRIAL FIBRILLATION, UNSPECIFIED TYPE (HCC): ICD-10-CM

## 2020-11-12 LAB
ATRIAL RATE: 70 BPM
CALCULATED P AXIS, ECG09: 6 DEGREES
CALCULATED R AXIS, ECG10: -117 DEGREES
CALCULATED T AXIS, ECG11: 52 DEGREES
DIAGNOSIS, 93000: NORMAL
P-R INTERVAL, ECG05: 196 MS
Q-T INTERVAL, ECG07: 490 MS
QRS DURATION, ECG06: 174 MS
QTC CALCULATION (BEZET), ECG08: 529 MS
VENTRICULAR RATE, ECG03: 70 BPM

## 2020-11-12 PROCEDURE — 74011250636 HC RX REV CODE- 250/636: Performed by: INTERNAL MEDICINE

## 2020-11-12 PROCEDURE — 93005 ELECTROCARDIOGRAM TRACING: CPT

## 2020-11-12 PROCEDURE — 77030018729 HC ELECTRD DEFIB PAD CARD -B

## 2020-11-12 PROCEDURE — 92960 CARDIOVERSION ELECTRIC EXT: CPT

## 2020-11-12 RX ORDER — FENTANYL CITRATE 50 UG/ML
25-50 INJECTION, SOLUTION INTRAMUSCULAR; INTRAVENOUS
Status: DISCONTINUED | OUTPATIENT
Start: 2020-11-12 | End: 2020-11-12

## 2020-11-12 RX ORDER — MIDAZOLAM HYDROCHLORIDE 1 MG/ML
.5-1 INJECTION, SOLUTION INTRAMUSCULAR; INTRAVENOUS
Status: DISCONTINUED | OUTPATIENT
Start: 2020-11-12 | End: 2020-11-12

## 2020-11-12 RX ADMIN — MIDAZOLAM 1 MG: 1 INJECTION INTRAMUSCULAR; INTRAVENOUS at 09:46

## 2020-11-12 RX ADMIN — FENTANYL CITRATE 25 MCG: 50 INJECTION, SOLUTION INTRAMUSCULAR; INTRAVENOUS at 09:47

## 2020-11-12 RX ADMIN — MIDAZOLAM 2 MG: 1 INJECTION INTRAMUSCULAR; INTRAVENOUS at 09:52

## 2020-11-12 RX ADMIN — MIDAZOLAM 1 MG: 1 INJECTION INTRAMUSCULAR; INTRAVENOUS at 09:49

## 2020-11-12 NOTE — PROGRESS NOTES
I have reviewed discharge instructions with the patient and wife Reginaldo Hammer. Provided education. All questions were answered. The patient verbalized understanding.

## 2020-11-12 NOTE — DISCHARGE INSTRUCTIONS
DISCHARGE SUMMARY       The following personal items collected during your admission are returned to you:   Dental Appliance:    Vision:    Hearing Aid:    Jewelry:    Clothing:          PATIENT INSTRUCTIONS: Continue taking all the same medications. The cardioversion procedure can cause redness to the skin where the patches were placed. You may treat this with Aloe or Cortisone cream over the counter lotion. If the skin appears very reddened or blistered contact your physician      Call to make an appointment with Ashli Hoang on Thursday 11/19/2020  What to do at Home:  Recommended activity: No driving today      The discharge information has been reviewed with the PATIENT . The PATIENT  verbalized understanding.

## 2020-11-12 NOTE — PROGRESS NOTES
Pt sedated with 4mg Versed and 25mcg Fentanyl for Cardioversion, given 1 synchronized shock(s) at 41 internal Joules, Afib converted to NSR. (monitored sedation from 0945 to 0954)

## 2020-11-12 NOTE — PROGRESS NOTES
Patient arrived to Non-Invasive Cardiology Lab for Out Patient Cardioversion Procedure. Staff introduced to patient. Patient identifiers verified with Name and Date of Birth. Procedure verified with patient. Consent forms reviewed and signed by patient or authorized representative and verified. Allergies verified. Patient informed of procedure and plan of care. Questions answered with review. Patient on cardiac monitor, non-invasive blood pressure, SPO2 monitor. On room air. Patient is A&Ox3. Patient reports no complaints. Patient on stretcher, in low position, with side rails up. Patient instructed to call for assistance as needed. Family in waiting room.  Alice Robertson

## 2020-11-18 NOTE — TELEPHONE ENCOUNTER
lov 8/21/18 Keystone Flap Text: The defect edges were debeveled with a #15 scalpel blade.  Given the location of the defect, shape of the defect a keystone flap was deemed most appropriate.  Using a sterile surgical marker, an appropriate keystone flap was drawn incorporating the defect, outlining the appropriate donor tissue and placing the expected incisions within the relaxed skin tension lines where possible. The area thus outlined was incised deep to adipose tissue with a #15 scalpel blade.  The skin margins were undermined to an appropriate distance in all directions around the primary defect and laterally outward around the flap utilizing iris scissors.

## 2020-11-19 ENCOUNTER — OFFICE VISIT (OUTPATIENT)
Dept: CARDIOLOGY CLINIC | Age: 70
End: 2020-11-19
Payer: MEDICARE

## 2020-11-19 ENCOUNTER — CLINICAL SUPPORT (OUTPATIENT)
Dept: CARDIOLOGY CLINIC | Age: 70
End: 2020-11-19
Payer: MEDICARE

## 2020-11-19 VITALS
OXYGEN SATURATION: 97 % | BODY MASS INDEX: 36.83 KG/M2 | HEART RATE: 73 BPM | RESPIRATION RATE: 18 BRPM | WEIGHT: 243 LBS | HEIGHT: 68 IN | SYSTOLIC BLOOD PRESSURE: 112 MMHG | DIASTOLIC BLOOD PRESSURE: 74 MMHG

## 2020-11-19 DIAGNOSIS — E78.2 MIXED HYPERLIPIDEMIA: ICD-10-CM

## 2020-11-19 DIAGNOSIS — I42.9 CARDIOMYOPATHY, UNSPECIFIED TYPE (HCC): ICD-10-CM

## 2020-11-19 DIAGNOSIS — Z01.810 PRE-OPERATIVE CARDIOVASCULAR EXAMINATION: ICD-10-CM

## 2020-11-19 DIAGNOSIS — I48.0 PAROXYSMAL ATRIAL FIBRILLATION (HCC): Primary | ICD-10-CM

## 2020-11-19 DIAGNOSIS — I50.22 SYSTOLIC CHF, CHRONIC (HCC): ICD-10-CM

## 2020-11-19 DIAGNOSIS — I25.10 CORONARY ARTERY DISEASE INVOLVING NATIVE CORONARY ARTERY OF NATIVE HEART WITHOUT ANGINA PECTORIS: ICD-10-CM

## 2020-11-19 DIAGNOSIS — I10 ESSENTIAL HYPERTENSION, BENIGN: ICD-10-CM

## 2020-11-19 DIAGNOSIS — Z95.810 BIVENTRICULAR ICD (IMPLANTABLE CARDIOVERTER-DEFIBRILLATOR) IN PLACE: Primary | ICD-10-CM

## 2020-11-19 PROCEDURE — 3017F COLORECTAL CA SCREEN DOC REV: CPT | Performed by: INTERNAL MEDICINE

## 2020-11-19 PROCEDURE — G8754 DIAS BP LESS 90: HCPCS | Performed by: INTERNAL MEDICINE

## 2020-11-19 PROCEDURE — 93005 ELECTROCARDIOGRAM TRACING: CPT | Performed by: INTERNAL MEDICINE

## 2020-11-19 PROCEDURE — G0463 HOSPITAL OUTPT CLINIC VISIT: HCPCS | Performed by: INTERNAL MEDICINE

## 2020-11-19 PROCEDURE — G8427 DOCREV CUR MEDS BY ELIG CLIN: HCPCS | Performed by: INTERNAL MEDICINE

## 2020-11-19 PROCEDURE — G8536 NO DOC ELDER MAL SCRN: HCPCS | Performed by: INTERNAL MEDICINE

## 2020-11-19 PROCEDURE — G8417 CALC BMI ABV UP PARAM F/U: HCPCS | Performed by: INTERNAL MEDICINE

## 2020-11-19 PROCEDURE — 93010 ELECTROCARDIOGRAM REPORT: CPT | Performed by: INTERNAL MEDICINE

## 2020-11-19 PROCEDURE — G8510 SCR DEP NEG, NO PLAN REQD: HCPCS | Performed by: INTERNAL MEDICINE

## 2020-11-19 PROCEDURE — G8752 SYS BP LESS 140: HCPCS | Performed by: INTERNAL MEDICINE

## 2020-11-19 PROCEDURE — 99214 OFFICE O/P EST MOD 30 MIN: CPT | Performed by: INTERNAL MEDICINE

## 2020-11-19 PROCEDURE — 1101F PT FALLS ASSESS-DOCD LE1/YR: CPT | Performed by: INTERNAL MEDICINE

## 2020-11-19 NOTE — PROGRESS NOTES
Subjective:      Chelsea Bah is a 69 y.o. male is here for EP consult. Sp dccv for AF. Is compliant with amio and oac. For eye surgery this year. The patient denies chest pain/ shortness of breath, orthopnea, PND, LE edema, palpitations, syncope, presyncope or fatigue. Patient Active Problem List    Diagnosis Date Noted    [facility]) 08/10/2018    Presence of biventricular AICD 08/10/2018    Abnormal stress ECG 08/10/2018    VT (ventricular tachycardia) (HCC) 08/10/2018    Chronic diastolic HF (heart failure) ([address] 88/34/2823    Systolic CHF, chronic ([address] 08/10/2018    S/P coronary artery stent placement 08/10/2018    VF (ventricular fibrillation) ([address] 08/10/2018    Severe obesity (BMI 35.0-39. 9) with comorbidity ([address] 04/19/2018    Low back pain 07/02/2013    Automatic implantable cardioverter-defibrillator in situ 06/11/2012    GERD (gastroesophageal reflux disease) 05/14/2012    Hematochezia 05/14/2012    Screen for colon cancer 05/14/2012    CAD (coronary artery disease) 02/16/2011    Mixed hyperlipidemia 02/16/2011    Cardiomyopathy ([address] 02/16/2011    Esophageal reflux 02/16/2011    Encounter for long-term (current) use of other medications 02/16/2011    Nocturia 02/16/2011    Abnormal PSA 02/16/2011    Essential hypertension, benign 02/16/2011      Constance Sewell MD  Past Medical History:   Diagnosis Date    [facility]) 8/10/2018    Abnormal PSA 2/16/2011    Abnormal stress ECG 8/10/2018    CAD (coronary artery disease) 2/16/2011    Cardiomyopathy ([address] 2/16/2011    Chronic diastolic HF (heart failure) ([address] 8/10/2018    Encounter for long-term (current) use of other medications 2/16/2011    Esophageal reflux 2/16/2011    Essential hypertension, benign 2/16/2011    GERD (gastroesophageal reflux disease) 5/14/2012    Heart attack ([address]     Low back pain 7/2/2013    Mixed hyperlipidemia 2/16/2011    Nocturia 2/16/2011    Pacemaker     Presence of biventricular AICD 8/10/2018    [employer] BIVICD implanted 5/2017    S/P coronary artery stent placement 8/10/2018    8/9/18 PCI/SUREKHA to LAD, RCA x 2    Systolic CHF, chronic (HCC) 8/10/2018    [facility]F [facility]ricular [facility]) [facility] 8/10/2018    VT (ventricular tachycardia) ([facility] 8/10/2018      Past Surgical History:   Procedure Laterality Date    HX CERVICAL FUSION  1997    c4,5,6    HX PACEMAKER      [place] COLONOSCOPY FLX DX W/COLLJ SPEC WHEN PFRMD  5/14/2012         [place] EGD TRANSORAL BIOPSY SINGLE/MULTIPLE  5/14/2012         REMOVAL GALLBLADDER       No Known Allergies   Family History   Problem Relation Age of Onset    Lung Disease Mother     Cancer Mother     Alcohol abuse Father     Heart Disease Father     negative for cardiac disease  Social History     Socioeconomic History    Marital status:      Spouse name: Not on file    Number of children: Not on file    Years of education: Not on file    Highest education level: Not on file   Tobacco Use    Smoking status: Never Smoker    Smokeless tobacco: Never Used   Substance and Sexual Activity    Alcohol use: No    Drug use: No     Current Outpatient Medications   Medication Sig    amiodarone (CORDARONE) 200 mg tablet Take 1 Tab by mouth daily.  erythromycin (ILOTYCIN) ophthalmic ointment APPLY 1/2-INCH STRIP TO THE RIGHT EYE THREE TIMES DAILY FOR 10 DAYS    rosuvastatin (CRESTOR) 20 mg tablet TAKE 1 TABLET BY MOUTH DAILY FOR CHOLESTEROL (Patient taking differently: 1/2 tab daily)    furosemide (LASIX) 20 mg tablet TAKE 1 TABLET EVERY MORNING FOR FLUID    tamsulosin (FLOMAX) 0.4 mg capsule TAKE 1 CAPSULE EVERY DAY    ezetimibe (ZETIA) 10 mg tablet TAKE 1 TABLET BY MOUTH EVERY DAY    carvediloL (COREG) 25 mg tablet TAKE 1 TABLET BY MOUTH TWO (2) TIMES DAILY (WITH MEALS).  Eliquis 5 mg tablet TAKE 1 TABLET BY MOUTH TWO (2) TIMES A DAY.  FOR BLOOD THINNER    gentamicin (GARAMYCIN) 0.1 % topical ointment     aspirin delayed-release 81 mg tablet Take  by mouth daily.  cyclobenzaprine (FLEXERIL) 10 mg tablet Take 1 Tab by mouth three (3) times daily as needed for Muscle Spasm(s).  captopril (CAPOTEN) 12.5 mg tablet Take 1 Tab by mouth two (2) times a day. (Patient taking differently: Take 12.5 mg by mouth two (2) times a day. Indications: HALF TAB BID)    nitroglycerin (NITROLINGUAL) 400 mcg/spray spray 1 Spray by SubLINGual route every five (5) minutes as needed.  cyanocobalamin 1,000 mcg tablet Take 1,000 mcg by mouth daily.  omega-3-dha-epa-dpa-fish oil 1,050-1,200 mg cap Take 1 Cap by mouth two (2) times a day.  cholecalciferol, vitamin d3, (VITAMIN D) 1,000 unit tablet Take  by mouth daily.  multivitamins-minerals-lutein (CENTRUM SILVER) Tab Take 1 Tab by mouth daily. No current facility-administered medications for this visit. Vitals:    11/19/20 0929   BP: 112/74   Pulse: 73   Resp: 18   SpO2: 97%   Weight: 243 lb (110.2 kg)   Height: 5' 8\" (1.727 m)       I have reviewed the nurses notes, vitals, problem list, allergy list, medical history, family, social history and medications. Review of Symptoms:    General: Pt denies excessive weight gain or loss. Pt is able to conduct ADL's  HEENT: Denies blurred vision, headaches, hearing loss, epistaxis and difficulty swallowing. Respiratory: Denies cough, congestion, shortness of breath, WING, wheezing or stridor. Cardiovascular: Denies precordial pain, palpitations, edema or PND  Gastrointestinal: Denies poor appetite, indigestion, abdominal pain or blood in stool  Genitourinary: Denies hematuria, dysuria, increased urinary frequency  Musculoskeletal: Denies joint pain or swelling from muscles or joints  Neurologic: Denies tremor, paresthesias, headache, or sensory motor disturbance  Psychiatric: Denies confusion, insomnia, depression  Integumentray: Denies rash, itching or ulcers.   Hematologic: Denies easy bruising, bleeding    Physical Exam:      General: Well developed, in no acute distress. HEENT: Eyes - PERRL, no jvd  Heart:  Normal S1/S2 negative S3 or S4. Regular, no murmur, gallop or rub. Respiratory: Clear bilaterally x 4, no wheezing or rales  Abdomen:   Soft, non-tender, bowel sounds are active. Extremities:  No edema, normal cap refill, no cyanosis. Musculoskeletal: No clubbing  Neuro: A&Ox3, speech clear, gait stable. Skin: Skin color is normal. No rashes or lesions. Non diaphoretic, no ulcers or subcutaneous nodule  Vascular: 2+ pulses symmetric in all extremities  Psych - judgement intact and orientation is wnl     Cardiographics    Ekg: av paced    Results for orders placed or performed during the hospital encounter of 11/12/20   EKG, 12 LEAD, INITIAL   Result Value Ref Range    Ventricular Rate 70 BPM    Atrial Rate 70 BPM    P-R Interval 196 ms    QRS Duration 174 ms    Q-T Interval 490 ms    QTC Calculation (Bezet) 529 ms    Calculated P Axis 6 degrees    Calculated R Axis -117 degrees    Calculated T Axis 52 degrees    Diagnosis       AV dual-paced rhythm  Biventricular pacemaker detected  When compared with ECG of 06-MAY-2019 05:29,  Vent.  rate has increased BY   6 BPM  Confirmed by Rosalee Ortiz, P.V. ([id number]) on 11/12/2020 11:45:20 PM           Lab Results   Component Value Date/Time    WBC 5.2 04/29/2020 08:12 AM    HGB 14.6 04/29/2020 08:12 AM    HCT 42.8 04/29/2020 08:12 AM    PLATELET 689 (L) 32/30/0561 08:12 AM    MCV 94 04/29/2020 08:12 AM      Lab Results   Component Value Date/Time    Sodium 141 11/04/2020 10:21 AM    Potassium 4.2 11/04/2020 10:21 AM    Chloride 107 (H) 11/04/2020 10:21 AM    CO2 20 11/04/2020 10:21 AM    Anion gap 7 08/10/2018 04:41 AM    Glucose 95 11/04/2020 10:21 AM    BUN 13 11/04/2020 10:21 AM    Creatinine 1.30 (H) 11/04/2020 10:21 AM    BUN/Creatinine ratio 10 11/04/2020 10:21 AM    GFR est AA 63 11/04/2020 10:21 AM    GFR est non-AA 55 (L) 11/04/2020 10:21 AM    Calcium 9.4 11/04/2020 10:21 AM    Bilirubin, total 1.5 (H) 11/04/2020 10:21 AM    Alk. phosphatase 50 11/04/2020 10:21 AM    Protein, total 6.2 11/04/2020 10:21 AM    Albumin 4.7 11/04/2020 10:21 AM    Globulin 2.3 07/29/2010 02:23 PM    A-G Ratio 3.1 (H) 11/04/2020 10:21 AM    ALT (SGPT) 27 11/04/2020 10:21 AM         Assessment:     Assessment:        ICD-10-CM ICD-9-CM    1. Paroxysmal atrial fibrillation (HCC)  I48.0 427.31 AMB POC EKG ROUTINE W/ 12 LEADS, INTER & REP      ECHO ADULT COMPLETE   2. Essential hypertension, benign  I10 401.1 ECHO ADULT COMPLETE   3. Systolic CHF, chronic (HCC)  I50.22 428.22 ECHO ADULT COMPLETE     428.0    4. Mixed hyperlipidemia  E78.2 272.2 ECHO ADULT COMPLETE   5. Coronary artery disease involving native coronary artery of native heart without angina pectoris  I25.10 414.01 ECHO ADULT COMPLETE   6. Pre-operative cardiovascular examination  Z01.810 V72.81      Orders Placed This Encounter    AMB POC EKG ROUTINE W/ 12 LEADS, INTER & REP     Order Specific Question:   Reason for Exam:     Answer:   routine        Plan:   Jeffy Ansari is av p;aced sp cardioversion. He is on oac and amio for af. Cont med rx for htn and hyperlipidemia. Will obtain an echo to assess lvef prior to eye surgery for pre op risk assessment. He will f/u post eye surgery to discuss AF ablation. Continue medical management for htn, hyperlipidemia, cad. Thank you for allowing me to participate in Jeffy Ansari 's care.     Edson Nettles MD, Farhat Becerra

## 2020-11-19 NOTE — PROGRESS NOTES
Chief Complaint   Patient presents with    Follow-up     cardioversion -    Ankle swelling     fabiano'- \"normal for me\"     1. Have you been to the ER, urgent care clinic since your last visit? Hospitalized since your last visit? No     2. Have you seen or consulted any other health care providers outside of the [address] [place] [address] since your last visit? Include any pap smears or colon screening.   No

## 2020-11-19 NOTE — LETTER
11/19/20 Patient: Reena Bowser YOB: 1950 Date of Visit: 11/19/2020 Ginnie Eisenmenger, MD 
[address] [place] [id number] VIA In Basket Dear Ginnie Eisenmenger, MD, Thank you for referring Mr. Chely Galloway to [address] [place], [place] [address] for evaluation. My notes for this consultation are attached. If you have questions, please do not hesitate to call me. I look forward to following your patient along with you. Sincerely, Matias Howe MD

## 2020-11-26 DIAGNOSIS — I25.10 CORONARY ARTERY DISEASE INVOLVING NATIVE CORONARY ARTERY OF NATIVE HEART WITHOUT ANGINA PECTORIS: ICD-10-CM

## 2020-11-26 DIAGNOSIS — I48.0 PAROXYSMAL ATRIAL FIBRILLATION (HCC): ICD-10-CM

## 2020-11-26 DIAGNOSIS — E78.2 MIXED HYPERLIPIDEMIA: ICD-10-CM

## 2020-11-26 DIAGNOSIS — I50.22 SYSTOLIC CHF, CHRONIC (HCC): ICD-10-CM

## 2020-11-26 DIAGNOSIS — I10 ESSENTIAL HYPERTENSION, BENIGN: ICD-10-CM

## 2020-12-01 ENCOUNTER — ANCILLARY PROCEDURE (OUTPATIENT)
Dept: CARDIOLOGY CLINIC | Age: 70
End: 2020-12-01
Payer: MEDICARE

## 2020-12-01 VITALS
WEIGHT: 243 LBS | DIASTOLIC BLOOD PRESSURE: 74 MMHG | HEIGHT: 68 IN | BODY MASS INDEX: 36.83 KG/M2 | SYSTOLIC BLOOD PRESSURE: 112 MMHG

## 2020-12-01 DIAGNOSIS — H10.9 CONJUNCTIVITIS OF RIGHT EYE, UNSPECIFIED CONJUNCTIVITIS TYPE: ICD-10-CM

## 2020-12-01 PROCEDURE — 93306 TTE W/DOPPLER COMPLETE: CPT | Performed by: INTERNAL MEDICINE

## 2020-12-01 RX ORDER — ERYTHROMYCIN 5 MG/G
OINTMENT OPHTHALMIC
Qty: 3.5 G | Refills: 0 | Status: SHIPPED | OUTPATIENT
Start: 2020-12-01 | End: 2021-06-17 | Stop reason: ALTCHOICE

## 2020-12-01 RX ADMIN — PERFLUTREN 1 ML: 6.52 INJECTION, SUSPENSION INTRAVENOUS at 15:45

## 2020-12-02 LAB
ECHO AO ASC DIAM: 3.68 CM
ECHO AO ROOT DIAM: 3.61 CM
ECHO AV PEAK GRADIENT: 5.4 MMHG
ECHO AV PEAK VELOCITY: 116.18 CM/S
ECHO EST RA PRESSURE: 3 MMHG
ECHO LA AREA 4C: 18.97 CM2
ECHO LA MAJOR AXIS: 4.47 CM
ECHO LA MINOR AXIS: 2.01 CM
ECHO LA VOL 2C: 52.67 ML (ref 18–58)
ECHO LA VOL 4C: 57.66 ML (ref 18–58)
ECHO LA VOL BP: 62.92 ML (ref 18–58)
ECHO LA VOL/BSA BIPLANE: 28.34 ML/M2 (ref 16–28)
ECHO LA VOLUME INDEX A2C: 23.72 ML/M2 (ref 16–28)
ECHO LA VOLUME INDEX A4C: 25.97 ML/M2 (ref 16–28)
ECHO LV E' LATERAL VELOCITY: 3.82 CM/S
ECHO LV INTERNAL DIMENSION DIASTOLIC: 5.7 CM (ref 4.2–5.9)
ECHO LV INTERNAL DIMENSION SYSTOLIC: 3.93 CM
ECHO LV ISOVOLUMETRIC RELAXATION TIME (IVRT): 74.22 MS
ECHO LV IVSD: 1.39 CM (ref 0.6–1)
ECHO LV MASS 2D: 361.1 G (ref 88–224)
ECHO LV MASS INDEX 2D: 162.6 G/M2 (ref 49–115)
ECHO LV POSTERIOR WALL DIASTOLIC: 1.43 CM (ref 0.6–1)
ECHO LVOT PEAK GRADIENT: 2.16 MMHG
ECHO LVOT PEAK VELOCITY: 73.5 CM/S
ECHO MV "A" WAVE DURATION: 150.33 MS
ECHO MV A VELOCITY: 94.57 CM/S
ECHO MV AREA PHT: 4.03 CM2
ECHO MV E DECELERATION TIME (DT): 188.41 MS
ECHO MV E VELOCITY: 54.54 CM/S
ECHO MV E/A RATIO: 0.58
ECHO MV E/E' LATERAL: 14.28
ECHO MV PRESSURE HALF TIME (PHT): 54.64 MS
ECHO RIGHT VENTRICULAR SYSTOLIC PRESSURE (RVSP): 26.81 MMHG
ECHO TV REGURGITANT MAX VELOCITY: 243.96 CM/S
ECHO TV REGURGITANT PEAK GRADIENT: 23.81 MMHG

## 2020-12-04 ENCOUNTER — TELEPHONE (OUTPATIENT)
Dept: CARDIOLOGY CLINIC | Age: 70
End: 2020-12-04

## 2020-12-04 ENCOUNTER — DOCUMENTATION ONLY (OUTPATIENT)
Dept: CARDIOLOGY CLINIC | Age: 70
End: 2020-12-04

## 2020-12-04 NOTE — PROGRESS NOTES
Pt is seeking clearance for oculofacial surgery with Dr Corby Zamora. Hx of CAD with PCI to LAD x 2 in 2018. EF at that time was 25-30%. Echo 12/1/2020 confirmsEF 25-30%. Biv ICD check 11/19/2020. He is asymptomatic per last visit 11/19/2020 with Dr Dominga Jackson, post [facility]. Reviewed with Dr Michelle Robin who feels Mr Ira Kothari may proceed from general cardiology perspective. Pt is considered moderate risk for cardiac complications for upcoming procedures. Ok to hold eliquis if necessary 48 hours prior to surgery and restart ASAP.

## 2020-12-15 ENCOUNTER — OFFICE VISIT (OUTPATIENT)
Dept: CARDIOLOGY CLINIC | Age: 70
End: 2020-12-15
Payer: MEDICARE

## 2020-12-15 ENCOUNTER — CLINICAL SUPPORT (OUTPATIENT)
Dept: CARDIOLOGY CLINIC | Age: 70
End: 2020-12-15

## 2020-12-15 VITALS
WEIGHT: 239 LBS | HEART RATE: 70 BPM | DIASTOLIC BLOOD PRESSURE: 76 MMHG | BODY MASS INDEX: 36.22 KG/M2 | HEIGHT: 68 IN | RESPIRATION RATE: 18 BRPM | OXYGEN SATURATION: 97 % | SYSTOLIC BLOOD PRESSURE: 118 MMHG

## 2020-12-15 DIAGNOSIS — I25.5 ISCHEMIC CARDIOMYOPATHY: ICD-10-CM

## 2020-12-15 DIAGNOSIS — Z95.810 BIVENTRICULAR ICD (IMPLANTABLE CARDIOVERTER-DEFIBRILLATOR) IN PLACE: Primary | ICD-10-CM

## 2020-12-15 DIAGNOSIS — Z95.810 PRE-OPERATIVE CARDIOVASCULAR EXAMINATION, ICD IN PLACE: ICD-10-CM

## 2020-12-15 DIAGNOSIS — E78.2 MIXED HYPERLIPIDEMIA: ICD-10-CM

## 2020-12-15 DIAGNOSIS — I50.22 SYSTOLIC CHF, CHRONIC (HCC): ICD-10-CM

## 2020-12-15 DIAGNOSIS — Z01.810 PRE-OPERATIVE CARDIOVASCULAR EXAMINATION, ICD IN PLACE: ICD-10-CM

## 2020-12-15 DIAGNOSIS — I42.9 CARDIOMYOPATHY, UNSPECIFIED TYPE (HCC): ICD-10-CM

## 2020-12-15 DIAGNOSIS — I48.0 PAROXYSMAL ATRIAL FIBRILLATION (HCC): Primary | ICD-10-CM

## 2020-12-15 DIAGNOSIS — I10 ESSENTIAL HYPERTENSION, BENIGN: ICD-10-CM

## 2020-12-15 PROCEDURE — G8754 DIAS BP LESS 90: HCPCS | Performed by: INTERNAL MEDICINE

## 2020-12-15 PROCEDURE — G8427 DOCREV CUR MEDS BY ELIG CLIN: HCPCS | Performed by: INTERNAL MEDICINE

## 2020-12-15 PROCEDURE — 1101F PT FALLS ASSESS-DOCD LE1/YR: CPT | Performed by: INTERNAL MEDICINE

## 2020-12-15 PROCEDURE — G8432 DEP SCR NOT DOC, RNG: HCPCS | Performed by: INTERNAL MEDICINE

## 2020-12-15 PROCEDURE — 99214 OFFICE O/P EST MOD 30 MIN: CPT | Performed by: INTERNAL MEDICINE

## 2020-12-15 PROCEDURE — 3017F COLORECTAL CA SCREEN DOC REV: CPT | Performed by: INTERNAL MEDICINE

## 2020-12-15 PROCEDURE — G8536 NO DOC ELDER MAL SCRN: HCPCS | Performed by: INTERNAL MEDICINE

## 2020-12-15 PROCEDURE — 93010 ELECTROCARDIOGRAM REPORT: CPT | Performed by: INTERNAL MEDICINE

## 2020-12-15 PROCEDURE — 93005 ELECTROCARDIOGRAM TRACING: CPT | Performed by: INTERNAL MEDICINE

## 2020-12-15 PROCEDURE — G8417 CALC BMI ABV UP PARAM F/U: HCPCS | Performed by: INTERNAL MEDICINE

## 2020-12-15 PROCEDURE — G0463 HOSPITAL OUTPT CLINIC VISIT: HCPCS | Performed by: INTERNAL MEDICINE

## 2020-12-15 PROCEDURE — G8752 SYS BP LESS 140: HCPCS | Performed by: INTERNAL MEDICINE

## 2020-12-15 NOTE — PROGRESS NOTES
1. Have you been to the ER, urgent care clinic since your last visit? Hospitalized since your last visit? No    2. Have you seen or consulted any other health care providers outside of the [address] [place] [address] since your last visit? Include any pap smears or colon screening. No    Chief Complaint   Patient presents with    Irregular Heart Beat     1 mo appt. Discuss AF ablation. Denied cardiac symptoms. Preop eye surgery with [facility],, date TBD.

## 2020-12-15 NOTE — PROGRESS NOTES
Subjective:      Gerson Beard is a 69 y.o. male is here for EP consult. He had his echo done and is here for follow up. He has sob with exertion. Patient Active Problem List    Diagnosis Date Noted    [facility]) 08/10/2018    Presence of biventricular AICD 08/10/2018    Abnormal stress ECG 08/10/2018    [facility]ricular [facility]cardia) ([facility]) 08/10/2018    Chronic diastolic HF (heart failure) ([address] 67/19/8807    Systolic CHF, chronic ([address] 08/10/2018    S/P coronary artery stent placement 08/10/2018    VF (ventricular fibrillation) ([address] 08/10/2018    Severe obesity (BMI 35.0-39. 9) with comorbidity ([address] 04/19/2018    Low back pain 07/02/2013    Automatic implantable cardioverter-defibrillator in situ 06/11/2012    GERD (gastroesophageal reflux disease) 05/14/2012    Hematochezia 05/14/2012    Screen for colon cancer 05/14/2012    CAD (coronary artery disease) 02/16/2011    Mixed hyperlipidemia 02/16/2011    Cardiomyopathy ([address] 02/16/2011    Esophageal reflux 02/16/2011    Encounter for long-term (current) use of other medications 02/16/2011    Nocturia 02/16/2011    Abnormal PSA 02/16/2011    Essential hypertension, benign 02/16/2011      Marlee Mtz MD  Past Medical History:   Diagnosis Date    [facility]) 8/10/2018    Abnormal PSA 2/16/2011    Abnormal stress ECG 8/10/2018    CAD (coronary artery disease) 2/16/2011    Cardiomyopathy ([address] 2/16/2011    Chronic diastolic HF (heart failure) ([address] 8/10/2018    Encounter for long-term (current) use of other medications 2/16/2011    Esophageal reflux 2/16/2011    Essential hypertension, benign 2/16/2011    GERD (gastroesophageal reflux disease) 5/14/2012    Heart attack ([address]     Low back pain 7/2/2013    Mixed hyperlipidemia 2/16/2011    Nocturia 2/16/2011    Pacemaker     Presence of biventricular AICD 8/10/2018    [employer] BIVICD implanted 5/2017    S/P coronary artery stent placement 8/10/2018 8/9/18 PCI/SUREKHA to LAD, RCA x 2    Systolic CHF, chronic ([facility] 8/10/2018    VF (ventricular fibrillation) ([facility] 8/10/2018    VT (ventricular tachycardia) ([facility] 8/10/2018      Past Surgical History:   Procedure Laterality Date    HX CERVICAL FUSION  1997    c4,5,6    HX PACEMAKER      [place] COLONOSCOPY FLX DX W/COLLJ SPEC WHEN PFRMD  5/14/2012         [place] EGD TRANSORAL BIOPSY SINGLE/MULTIPLE  5/14/2012         REMOVAL GALLBLADDER       No Known Allergies   Family History   Problem Relation Age of Onset    Lung Disease Mother     Cancer Mother     Alcohol abuse Father     Heart Disease Father     negative for cardiac disease  Social History     Socioeconomic History    Marital status:      Spouse name: Not on file    Number of children: Not on file    Years of education: Not on file    Highest education level: Not on file   Tobacco Use    Smoking status: Never Smoker    Smokeless tobacco: Never Used   Substance and Sexual Activity    Alcohol use: No    Drug use: No     Current Outpatient Medications   Medication Sig    erythromycin (ILOTYCIN) ophthalmic ointment APPLY 1/2-INCH STRIP TO THE RIGHT EYE THREE TIMES DAILY FOR 10 DAYS    amiodarone (CORDARONE) 200 mg tablet Take 1 Tab by mouth daily.  rosuvastatin (CRESTOR) 20 mg tablet TAKE 1 TABLET BY MOUTH DAILY FOR CHOLESTEROL (Patient taking differently: 1/2 tab daily)    furosemide (LASIX) 20 mg tablet TAKE 1 TABLET EVERY MORNING FOR FLUID    tamsulosin (FLOMAX) 0.4 mg capsule TAKE 1 CAPSULE EVERY DAY    ezetimibe (ZETIA) 10 mg tablet TAKE 1 TABLET BY MOUTH EVERY DAY    carvediloL (COREG) 25 mg tablet TAKE 1 TABLET BY MOUTH TWO (2) TIMES DAILY (WITH MEALS).  Eliquis 5 mg tablet TAKE 1 TABLET BY MOUTH TWO (2) TIMES A DAY. FOR BLOOD THINNER    gentamicin (GARAMYCIN) 0.1 % topical ointment four (4) times daily.  aspirin delayed-release 81 mg tablet Take  by mouth daily.     cyclobenzaprine (FLEXERIL) 10 mg tablet Take 1 Tab by mouth three (3) times daily as needed for Muscle Spasm(s).  captopril (CAPOTEN) 12.5 mg tablet Take 1 Tab by mouth two (2) times a day. (Patient taking differently: Take 12.5 mg by mouth two (2) times a day. Indications: HALF TAB BID)    nitroglycerin (NITROLINGUAL) 400 mcg/spray spray 1 Spray by SubLINGual route every five (5) minutes as needed.  cyanocobalamin 1,000 mcg tablet Take 1,000 mcg by mouth daily.  omega-3-dha-epa-dpa-fish oil 1,050-1,200 mg cap Take 1 Cap by mouth two (2) times a day.  cholecalciferol, vitamin d3, (VITAMIN D) 1,000 unit tablet Take  by mouth daily.  multivitamins-minerals-lutein (CENTRUM SILVER) Tab Take 1 Tab by mouth daily. No current facility-administered medications for this visit. Vitals:    12/15/20 0929   BP: 118/76   Pulse: 70   Resp: 18   SpO2: 97%   Weight: 239 lb (108.4 kg)   Height: 5' 8\" (1.727 m)       I have reviewed the nurses notes, vitals, problem list, allergy list, medical history, family, social history and medications. Review of Symptoms:    General: Pt denies excessive weight gain or loss. Pt is able to conduct ADL's  HEENT: Denies blurred vision, headaches, hearing loss, epistaxis and difficulty swallowing. Respiratory: +drew, Denies cough, congestion, shortness of breath, wheezing or stridor. Cardiovascular: Denies precordial pain, palpitations, edema or PND  Gastrointestinal: Denies poor appetite, indigestion, abdominal pain or blood in stool  Genitourinary: Denies hematuria, dysuria, increased urinary frequency  Musculoskeletal: Denies joint pain or swelling from muscles or joints  Neurologic: Denies tremor, paresthesias, headache, or sensory motor disturbance  Psychiatric: Denies confusion, insomnia, depression  Integumentray: Denies rash, itching or ulcers. Hematologic: Denies easy bruising, bleeding    Physical Exam:      General: Well developed, in no acute distress.   HEENT: Eyes - PERRL, no jvd  Heart:  Normal S1/S2 negative S3 or S4. Regular, no murmur, gallop or rub. Respiratory: Clear bilaterally x 4, no wheezing or rales  Abdomen:   Soft, non-tender, bowel sounds are active. Extremities:  No edema, normal cap refill, no cyanosis. Musculoskeletal: No clubbing  Neuro: A&Ox3, speech clear, gait stable. Skin: Skin color is normal. No rashes or lesions. Non diaphoretic, no ulcers or subcutaneous nodule  Vascular: 2+ pulses symmetric in all extremities  Psych - judgement intact and orientation is wnl     Cardiographics    Ekg: av paced    icd - A paced 98%, biv paced 99%    Results for orders placed or performed during the hospital encounter of 11/12/20   EKG, 12 LEAD, INITIAL   Result Value Ref Range    Ventricular Rate 70 BPM    Atrial Rate 70 BPM    P-R Interval 196 ms    QRS Duration 174 ms    Q-T Interval 490 ms    QTC Calculation (Bezet) 529 ms    Calculated P Axis 6 degrees    Calculated R Axis -117 degrees    Calculated T Axis 52 degrees    Diagnosis       AV dual-paced rhythm  Biventricular pacemaker detected  When compared with ECG of 06-MAY-2019 05:29,  Vent. rate has increased BY   6 BPM  Confirmed by Eric Cosme P.VCitlali ([id number]) on 11/12/2020 11:45:20 PM           Lab Results   Component Value Date/Time    WBC 5.2 04/29/2020 08:12 AM    HGB 14.6 04/29/2020 08:12 AM    HCT 42.8 04/29/2020 08:12 AM    PLATELET 646 (L) 85/05/4812 08:12 AM    MCV 94 04/29/2020 08:12 AM      Lab Results   Component Value Date/Time    Sodium 141 11/04/2020 10:21 AM    Potassium 4.2 11/04/2020 10:21 AM    Chloride 107 (H) 11/04/2020 10:21 AM    CO2 20 11/04/2020 10:21 AM    Anion gap 7 08/10/2018 04:41 AM    Glucose 95 11/04/2020 10:21 AM    BUN 13 11/04/2020 10:21 AM    Creatinine 1.30 (H) 11/04/2020 10:21 AM    BUN/Creatinine ratio 10 11/04/2020 10:21 AM    GFR est AA 63 11/04/2020 10:21 AM    GFR est non-AA 55 (L) 11/04/2020 10:21 AM    Calcium 9.4 11/04/2020 10:21 AM    Bilirubin, total 1.5 (H) 11/04/2020 10:21 AM    Alk.  phosphatase 50 11/04/2020 10:21 AM    Protein, total 6.2 11/04/2020 10:21 AM    Albumin 4.7 11/04/2020 10:21 AM    Globulin 2.3 07/29/2010 02:23 PM    A-G Ratio 3.1 (H) 11/04/2020 10:21 AM    ALT (SGPT) 27 11/04/2020 10:21 AM         Assessment:     Assessment:        ICD-10-CM ICD-9-CM    1. Paroxysmal atrial fibrillation (HCC)  I48.0 427.31 AMB POC EKG ROUTINE W/ 12 LEADS, INTER & REP   2. Systolic CHF, chronic (HCC)  I50.22 428.22      428.0    3. Essential hypertension, benign  I10 401.1    4. Mixed hyperlipidemia  E78.2 272.2    5. Ischemic cardiomyopathy  I25.5 414.8    6. Pre-operative cardiovascular examination, ICD in place  Z01.810 V72.81     Z95.810 V45.02      Orders Placed This Encounter    AMB POC EKG ROUTINE W/ 12 LEADS, INTER & REP     Order Specific Question:   Reason for Exam:     Answer:   routine        Plan:   William Brar is av paced. A paced 98% for sick sinus and biv paced 99% for cardiomyopathy. No further AF since dccv and being started on amio. He is for oculoplastic surgery and is moderate risk for cardiac issues with anesthesia secondary to his lvef 25-30% and class II CHF. Cont med rx for cardiomyopathy, htn and hyperlipidemia. Will f/u post eye surgery. Continue medical management for cardiomyopathy, chf, htn and AF. Thank you for allowing me to participate in William Brar 's care.     Moreno Vang MD, Richard Arevalo

## 2020-12-15 NOTE — LETTER
12/15/20 Patient: Raffi Dewitt YOB: 1950 Date of Visit: 12/15/2020 Roxie Vega MD 
[address] [place] [id number] VIA In Basket Dear Roxie Vega MD, Thank you for referring Mr. Gibson Espinal to [address] [place] [address] for evaluation. My notes for this consultation are attached. If you have questions, please do not hesitate to call me. I look forward to following your patient along with you. Sincerely, Jose Hernández MD

## 2020-12-29 DIAGNOSIS — I42.9 CARDIOMYOPATHY, UNSPECIFIED TYPE (HCC): ICD-10-CM

## 2020-12-29 RX ORDER — CARVEDILOL 25 MG/1
TABLET ORAL
Qty: 60 TAB | Refills: 3 | Status: SHIPPED | OUTPATIENT
Start: 2020-12-29 | End: 2021-05-05

## 2021-01-29 RX ORDER — AMIODARONE HYDROCHLORIDE 200 MG/1
TABLET ORAL
Qty: 90 TAB | Refills: 0 | Status: SHIPPED | OUTPATIENT
Start: 2021-01-29 | End: 2021-04-28

## 2021-02-09 ENCOUNTER — CLINICAL SUPPORT (OUTPATIENT)
Dept: CARDIOLOGY CLINIC | Age: 71
End: 2021-02-09
Payer: MEDICARE

## 2021-02-09 DIAGNOSIS — I42.9 CARDIOMYOPATHY, UNSPECIFIED TYPE (HCC): ICD-10-CM

## 2021-02-09 DIAGNOSIS — Z95.810 BIVENTRICULAR ICD (IMPLANTABLE CARDIOVERTER-DEFIBRILLATOR) IN PLACE: Primary | ICD-10-CM

## 2021-02-09 PROCEDURE — 93284 PRGRMG EVAL IMPLANTABLE DFB: CPT | Performed by: INTERNAL MEDICINE

## 2021-03-01 RX ORDER — EZETIMIBE 10 MG/1
TABLET ORAL
Qty: 90 TAB | Refills: 0 | Status: SHIPPED | OUTPATIENT
Start: 2021-03-01 | End: 2021-06-01

## 2021-03-01 RX ORDER — ROSUVASTATIN CALCIUM 20 MG/1
TABLET, COATED ORAL
Qty: 90 TAB | Refills: 0 | Status: SHIPPED | OUTPATIENT
Start: 2021-03-01 | End: 2021-05-30

## 2021-05-05 DIAGNOSIS — I42.9 CARDIOMYOPATHY, UNSPECIFIED TYPE (HCC): ICD-10-CM

## 2021-05-05 RX ORDER — CARVEDILOL 25 MG/1
TABLET ORAL
Qty: 60 TAB | Refills: 2 | Status: SHIPPED | OUTPATIENT
Start: 2021-05-05 | End: 2021-08-23

## 2021-05-06 NOTE — PROGRESS NOTES
[address] [address] [address] [facility]  [phone]     Subjective:      Noemy Salazar is a 66 y.o. male is here for a f/u appt. Last seen in office 10/7/21. He has a PMHx of CAD, ischemic cardiomyopathy s/p BiV ICD, PAF, HTN and HLD. Last seen by us in 10/2020. He is s/p cardioversion 11/12/2020. He followed up with EP 12/2020: No further AF since dccv and being started on amio. Today, states feeling well. He had oculoplastic surgery 12/23/2020 and has been life changing for him. Currently on abx for right leg cellulitis, pcp following. Denies any cardiac complaints. He walks about [phone] steps a day at the least, no exertional symptoms. He denies chest pain/ shortness of breath, orthopnea, PND, LE edema, palpitations, syncope, or presyncope. Echo 12/2020  · LV: Estimated LVEF is 25 - 30%. Normal cavity size. Moderate concentric hypertrophy. Moderate posterior wall hypertrophy. Severely reduced systolic function. Severe hypokinesis of the apical inferior, apical anterior, apical septal and apical lateral wall(s). The [place] was noted to be akinetic. Age-appropriate left ventricular diastolic function. · MV: Mild mitral valve regurgitation is present. Patient Active Problem List    Diagnosis Date Noted    A-fib [facility]) 08/10/2018    Presence of biventricular AICD 08/10/2018    Abnormal stress ECG 08/10/2018    VT (ventricular tachycardia) (HCC) 08/10/2018    Chronic diastolic HF (heart failure) ([address] 71/93/4909    Systolic CHF, chronic ([address] 08/10/2018    S/P coronary artery stent placement 08/10/2018    VF (ventricular fibrillation) ([address] 08/10/2018    Severe obesity (BMI 35.0-39. 9) with comorbidity ([address] 04/19/2018    Low back pain 07/02/2013    Automatic implantable cardioverter-defibrillator in situ 06/11/2012    GERD (gastroesophageal reflux disease) 05/14/2012    Hematochezia 05/14/2012    Screen for colon cancer 05/14/2012    CAD (coronary artery disease) 02/16/2011    Mixed hyperlipidemia 02/16/2011    Cardiomyopathy ([address] 02/16/2011    Esophageal reflux 02/16/2011    Encounter for long-term (current) use of other medications 02/16/2011    Nocturia 02/16/2011    Abnormal PSA 02/16/2011    Essential hypertension, benign 02/16/2011      Medina Baeza MD  Past Medical History:   Diagnosis Date    A-fib [facility]) 8/10/2018    Abnormal PSA 2/16/2011    Abnormal stress ECG 8/10/2018    CAD (coronary artery disease) 2/16/2011    Cardiomyopathy ([address] 2/16/2011    Chronic diastolic HF (heart failure) ([address] 8/10/2018    Encounter for long-term (current) use of other medications 2/16/2011    Esophageal reflux 2/16/2011    Essential hypertension, benign 2/16/2011    GERD (gastroesophageal reflux disease) 5/14/2012    Heart attack ([address]     Low back pain 7/2/2013    Mixed hyperlipidemia 2/16/2011    Nocturia 2/16/2011    Pacemaker     Presence of biventricular AICD 8/10/2018    [employer] BIVICD implanted 5/2017    S/P coronary artery stent placement 8/10/2018    8/9/18 PCI/SUREKHA to LAD, RCA x 2    Systolic CHF, chronic (HCC) 8/10/2018    VF (ventricular fibrillation) ([address] 8/10/2018    VT (ventricular tachycardia) ([address] 8/10/2018      Past Surgical History:   Procedure Laterality Date    HX CERVICAL FUSION  1997    c4,5,6    HX PACEMAKER      [place] COLONOSCOPY FLX DX W/COLLJ SPEC WHEN PFRMD  5/14/2012         [place] EGD TRANSORAL BIOPSY SINGLE/MULTIPLE  5/14/2012         [place] REMOVAL GALLBLADDER       No Known Allergies   Family History   Problem Relation Age of Onset    Lung Disease Mother     Cancer Mother     Alcohol abuse Father     Heart Disease Father       Social History     Socioeconomic History    Marital status:      Spouse name: Not on file    Number of children: Not on file    Years of education: Not on file    Highest education level: Not on file   Occupational History    Not on file Social Needs    Financial resource strain: Not on file    Food insecurity     Worry: Not on file     Inability: Not on file    Transportation needs     Medical: Not on file     Non-medical: Not on file   Tobacco Use    Smoking status: Never Smoker    Smokeless tobacco: Never Used   Substance and Sexual Activity    Alcohol use: No    Drug use: No    Sexual activity: Not on file   Lifestyle    Physical activity     Days per week: Not on file     Minutes per session: Not on file    Stress: Not on file   Relationships    Social connections     Talks on phone: Not on file     Gets together: Not on file     Attends [employer] service: Not on file     Active member of club or organization: Not on file     Attends meetings of clubs or organizations: Not on file     Relationship status: Not on file    Intimate partner violence     Fear of current or ex partner: Not on file     Emotionally abused: Not on file     Physically abused: Not on file     Forced sexual activity: Not on file   Other Topics Concern    Not on file   Social History Narrative    Not on file      Current Outpatient Medications   Medication Sig    carvediloL (COREG) 25 mg tablet TAKE 1 TABLET BY MOUTH TWO (2) TIMES DAILY (WITH MEALS).  Eliquis 5 mg tablet TAKE 1 TABLET BY MOUTH TWO (2) TIMES A DAY. FOR BLOOD THINNER    amiodarone (CORDARONE) 200 mg tablet TAKE 1 TABLET EVERY DAY FOR HEART RYTHYM    ezetimibe (ZETIA) 10 mg tablet TAKE 1 TABLET EVERY DAY    rosuvastatin (CRESTOR) 20 mg tablet TAKE 1 TABLET BY MOUTH DAILY FOR CHOLESTEROL    erythromycin (ILOTYCIN) ophthalmic ointment APPLY 1/2-INCH STRIP TO THE RIGHT EYE THREE TIMES DAILY FOR 10 DAYS    furosemide (LASIX) 20 mg tablet TAKE 1 TABLET EVERY MORNING FOR FLUID    tamsulosin (FLOMAX) 0.4 mg capsule TAKE 1 CAPSULE EVERY DAY    gentamicin (GARAMYCIN) 0.1 % topical ointment four (4) times daily.  aspirin delayed-release 81 mg tablet Take  by mouth daily.     cyclobenzaprine (FLEXERIL) 10 mg tablet Take 1 Tab by mouth three (3) times daily as needed for Muscle Spasm(s).  captopril (CAPOTEN) 12.5 mg tablet Take 1 Tab by mouth two (2) times a day.  nitroglycerin (NITROLINGUAL) 400 mcg/spray spray 1 Spray by SubLINGual route every five (5) minutes as needed.  cyanocobalamin 1,000 mcg tablet Take 1,000 mcg by mouth daily.  omega-3-dha-epa-dpa-fish oil 1,050-1,200 mg cap Take 1 Cap by mouth two (2) times a day.  cholecalciferol, vitamin d3, (VITAMIN D) 1,000 unit tablet Take  by mouth daily.  multivitamins-minerals-lutein (CENTRUM SILVER) Tab Take 1 Tab by mouth daily. No current facility-administered medications for this visit. Review of Symptoms:  11 systems reviewed, negative other than as stated in the HPI    Physical ExamPhysical Exam:    Vitals:    05/07/21 0906   BP: 120/74   Pulse: 71   Resp: 18   SpO2: 98%   Weight: 236 lb 1.6 oz (107.1 kg)   Height: 5' 8\" (1.727 m)     Body mass index is 35.9 kg/m². General PE  Gen:  NAD  Mental Status - Alert. General Appearance - Not in acute distress. HEENT:  PERRL, no carotid bruits or JVD  Chest and Lung Exam   Inspection: Accessory muscles - No use of accessory muscles in breathing. Auscultation:   Breath sounds: - Normal.   Cardiovascular   Inspection: Jugular vein - Bilateral - Inspection Normal.   Palpation/Percussion:   Apical Impulse: - Normal.   Auscultation: Rhythm - Regular. Heart Sounds - S1 WNL and S2 WNL. No S3 or S4. Murmurs & Other Heart Sounds: Auscultation of the heart reveals - No Murmurs. Peripheral Vascular   Upper Extremity: Inspection - Bilateral - No Cyanotic nailbeds or Digital clubbing. Lower Extremity:   Palpation: Edema - Bilateral - No edema. Abdomen:   Soft, non-tender, bowel sounds are active.   Neuro: A&O times 3, CN and motor grossly WNL    Labs:   Lab Results   Component Value Date/Time    Cholesterol, total 127 04/29/2020 08:12 AM    Cholesterol, total 138 02/26/2019 02:24 PM    Cholesterol, total 111 08/15/2018 02:53 PM    Cholesterol, total 128 07/16/2015 11:18 AM    Cholesterol, total 123 08/25/2014 08:56 AM    HDL Cholesterol 49 04/29/2020 08:12 AM    HDL Cholesterol 52 02/26/2019 02:24 PM    HDL Cholesterol 52 07/16/2015 11:18 AM    HDL Cholesterol 40 08/25/2014 08:56 AM    HDL Cholesterol 48 02/11/2014 08:16 AM    LDL, calculated 62 04/29/2020 08:12 AM    LDL, calculated 68 02/26/2019 02:24 PM    LDL, calculated 59 07/16/2015 11:18 AM    LDL, calculated 63 08/25/2014 08:56 AM    LDL, calculated 68 02/11/2014 08:16 AM    Triglyceride 81 04/29/2020 08:12 AM    Triglyceride 92 02/26/2019 02:24 PM    Triglyceride 87 07/16/2015 11:18 AM    Triglyceride 101 08/25/2014 08:56 AM    Triglyceride 78 02/11/2014 08:16 AM     No results found for: CPK, CPKX, CPX  Lab Results   Component Value Date/Time    Sodium 141 11/04/2020 10:21 AM    Potassium 4.2 11/04/2020 10:21 AM    Chloride 107 (H) 11/04/2020 10:21 AM    CO2 20 11/04/2020 10:21 AM    Anion gap 7 08/10/2018 04:41 AM    Glucose 95 11/04/2020 10:21 AM    BUN 13 11/04/2020 10:21 AM    Creatinine 1.30 (H) 11/04/2020 10:21 AM    BUN/Creatinine ratio 10 11/04/2020 10:21 AM    GFR est AA 63 11/04/2020 10:21 AM    GFR est non-AA 55 (L) 11/04/2020 10:21 AM    Calcium 9.4 11/04/2020 10:21 AM    Bilirubin, total 1.5 (H) 11/04/2020 10:21 AM    Alk. phosphatase 50 11/04/2020 10:21 AM    Protein, total 6.2 11/04/2020 10:21 AM    Albumin 4.7 11/04/2020 10:21 AM    Globulin 2.3 07/29/2010 02:23 PM    A-G Ratio 3.1 (H) 11/04/2020 10:21 AM    ALT (SGPT) 27 11/04/2020 10:21 AM       EKG:  Paced     Assessment:     Assessment:      1. Coronary artery disease due to lipid rich plaque    2. Systolic CHF, chronic (HCC)    3. Paroxysmal atrial fibrillation ([facility]    4. Severe obesity (BMI 35.0-39. 9) with comorbidity ([facility]    5. Essential hypertension, benign    6. Mixed hyperlipidemia    7.  Biventricular ICD (implantable cardioverter-defibrillator) in place        Orders Placed This Encounter    AMB POC EKG ROUTINE W/ 12 LEADS, INTER & REP     Order Specific Question:   Reason for Exam:     Answer:   Routine        Plan:     Coronary artery disease involving native coronary artery of native heart without angina pectoris  Hx SUREKHA to the dRCA and mLAD in 8/2018. Today denies angina or anginal equivalent symptoms. EKG V paced. Continue medical management with Coreg 25mg bid, ASA and Crestor 80CX      Systolic CHF, chronic; BiV-ICD upgrade 5/17  Per echo 12/1/2020 EF decreased to 25-30%. 5/2017 EF 25-30%. Echo  9/2017 with preserved ejection fraction 50-55% with grade 1 DD with mild MR. Euvolemic on exam today  Continue with captopril, coreg and Lasix  Repeat echo now, decreased EF likely d/t recurr AF around that time    Paroxysmal atrial fibrillation   Had recurrent [facility]ib episode 10/2020 with [facility] 11/12/2020 started on Amiodarone. Device followed by Dr Neville House, per device interrogation 2/9/21 no recurrence  continue with Eliquis therapy and Coreg and Amiodarone     Mixed hyperlipidemia  4/2020 LDL 62 On statin Labs and lipids per PCP       Essential hypertension, benign  BP controlled. Continue anti-hypertensive therapy and low sodium diet       F/u with Dr. Ericka Whitt in 6 months    Patient seen and examined by me with nurse practitioner. Anand Hernandez personally performed all components of the history, physical, and medical decision making and agree with the assessment and plan with minor modifications as noted. Doing well.   Echo to assess LV function

## 2021-05-07 ENCOUNTER — OFFICE VISIT (OUTPATIENT)
Dept: CARDIOLOGY CLINIC | Age: 71
End: 2021-05-07
Payer: MEDICARE

## 2021-05-07 VITALS
WEIGHT: 236.1 LBS | BODY MASS INDEX: 35.78 KG/M2 | HEIGHT: 68 IN | HEART RATE: 71 BPM | DIASTOLIC BLOOD PRESSURE: 74 MMHG | SYSTOLIC BLOOD PRESSURE: 120 MMHG | RESPIRATION RATE: 18 BRPM | OXYGEN SATURATION: 98 %

## 2021-05-07 DIAGNOSIS — I10 ESSENTIAL HYPERTENSION, BENIGN: ICD-10-CM

## 2021-05-07 DIAGNOSIS — I25.10 CORONARY ARTERY DISEASE DUE TO LIPID RICH PLAQUE: Primary | ICD-10-CM

## 2021-05-07 DIAGNOSIS — E66.01 SEVERE OBESITY (BMI 35.0-39.9) WITH COMORBIDITY (HCC): ICD-10-CM

## 2021-05-07 DIAGNOSIS — Z95.810 BIVENTRICULAR ICD (IMPLANTABLE CARDIOVERTER-DEFIBRILLATOR) IN PLACE: ICD-10-CM

## 2021-05-07 DIAGNOSIS — I25.83 CORONARY ARTERY DISEASE DUE TO LIPID RICH PLAQUE: Primary | ICD-10-CM

## 2021-05-07 DIAGNOSIS — E78.2 MIXED HYPERLIPIDEMIA: ICD-10-CM

## 2021-05-07 DIAGNOSIS — I50.22 SYSTOLIC CHF, CHRONIC (HCC): ICD-10-CM

## 2021-05-07 DIAGNOSIS — I48.0 PAROXYSMAL ATRIAL FIBRILLATION (HCC): ICD-10-CM

## 2021-05-07 PROCEDURE — 99214 OFFICE O/P EST MOD 30 MIN: CPT | Performed by: INTERNAL MEDICINE

## 2021-05-07 PROCEDURE — G8752 SYS BP LESS 140: HCPCS | Performed by: INTERNAL MEDICINE

## 2021-05-07 PROCEDURE — 93005 ELECTROCARDIOGRAM TRACING: CPT | Performed by: INTERNAL MEDICINE

## 2021-05-07 PROCEDURE — 93010 ELECTROCARDIOGRAM REPORT: CPT | Performed by: INTERNAL MEDICINE

## 2021-05-07 PROCEDURE — 1101F PT FALLS ASSESS-DOCD LE1/YR: CPT | Performed by: INTERNAL MEDICINE

## 2021-05-07 PROCEDURE — G0463 HOSPITAL OUTPT CLINIC VISIT: HCPCS | Performed by: INTERNAL MEDICINE

## 2021-05-07 PROCEDURE — G8432 DEP SCR NOT DOC, RNG: HCPCS | Performed by: INTERNAL MEDICINE

## 2021-05-07 PROCEDURE — 3017F COLORECTAL CA SCREEN DOC REV: CPT | Performed by: INTERNAL MEDICINE

## 2021-05-07 PROCEDURE — G8417 CALC BMI ABV UP PARAM F/U: HCPCS | Performed by: INTERNAL MEDICINE

## 2021-05-07 PROCEDURE — G8427 DOCREV CUR MEDS BY ELIG CLIN: HCPCS | Performed by: INTERNAL MEDICINE

## 2021-05-07 PROCEDURE — G8536 NO DOC ELDER MAL SCRN: HCPCS | Performed by: INTERNAL MEDICINE

## 2021-05-07 PROCEDURE — G8754 DIAS BP LESS 90: HCPCS | Performed by: INTERNAL MEDICINE

## 2021-05-07 NOTE — PROGRESS NOTES
Chief Complaint   Patient presents with    Coronary Artery Disease     6 Month Follow Up; Denies Cardiac Symptoms, Some Leg Swelling; Right Leg Cellulitis, Denies Leg Pain     CHF    Irregular Heart Beat     Visit Vitals  /74 (BP 1 Location: Right arm, BP Patient Position: Sitting, BP Cuff Size: Large adult)   Pulse 71   Resp 18   Ht 5' 8\" (1.727 m)   Wt 236 lb 1.6 oz (107.1 kg)   SpO2 98%   BMI 35.90 kg/m²     1. Have you been to the ER, urgent care clinic since your last visit? Hospitalized since your last visit? No    2. Have you seen or consulted any other health care providers outside of the [address] [place] [address] since your last visit? Include any pap smears or colon screening.  No

## 2021-05-07 NOTE — LETTER
5/7/2021 Patient: Benito Taylor YOB: 1950 Date of Visit: 5/7/2021 Ricki Jackson MD 
[address] [place] [address] Via In [employer] Dear Ricki Jackson MD, Thank you for referring Mr. Alix Lozano to [address] [place] [address] for evaluation. My notes for this consultation are attached. If you have questions, please do not hesitate to call me. I look forward to following your patient along with you.  
 
 
Sincerely, 
 
Kenny Nguyen MD

## 2021-05-11 ENCOUNTER — OFFICE VISIT (OUTPATIENT)
Dept: CARDIOLOGY CLINIC | Age: 71
End: 2021-05-11
Payer: MEDICARE

## 2021-05-11 DIAGNOSIS — I25.5 ISCHEMIC CARDIOMYOPATHY: ICD-10-CM

## 2021-05-11 DIAGNOSIS — Z95.810 BIVENTRICULAR ICD (IMPLANTABLE CARDIOVERTER-DEFIBRILLATOR) IN PLACE: Primary | ICD-10-CM

## 2021-05-11 PROCEDURE — 93284 PRGRMG EVAL IMPLANTABLE DFB: CPT | Performed by: INTERNAL MEDICINE

## 2021-05-12 ENCOUNTER — TELEPHONE (OUTPATIENT)
Dept: CARDIOLOGY CLINIC | Age: 71
End: 2021-05-12

## 2021-05-12 NOTE — TELEPHONE ENCOUNTER
----- Message from SRAVANTHI Sheriff sent at 5/12/2021  8:40 AM EDT -----  Regarding: RE: AF/amio  Lower to 100 mg daily. Thanks.   ----- Message -----  From: Ritchie Fallon, RN  Sent: 5/11/2021   8:57 AM EDT  To: SRAVANTHI Sheriff  Subject: AF/amio                                          Had a [facility] in Dec 2020, started on amio. [place] AF abl if more AF, but hasnt had any AF since [facility] so he would like to hold off on any procedures unless needed. He wanted to verify to still take amio 200mg daily. Thanks!   Nereyda Frazier

## 2021-05-12 NOTE — TELEPHONE ENCOUNTER
Spoke with pt regarding his medication - he will cut his 200mg amiodarone pills in have and take 100mg daily, starting today. Not on remote monitoring so recommended if he does start feeling his HR be irregular to let us know and we would bring him in sooner than his scheduled 3 mo check. No further questions at this time.

## 2021-05-18 ENCOUNTER — ANCILLARY PROCEDURE (OUTPATIENT)
Dept: CARDIOLOGY CLINIC | Age: 71
End: 2021-05-18
Payer: MEDICARE

## 2021-05-18 VITALS
DIASTOLIC BLOOD PRESSURE: 74 MMHG | HEIGHT: 68 IN | SYSTOLIC BLOOD PRESSURE: 120 MMHG | BODY MASS INDEX: 35.77 KG/M2 | WEIGHT: 236 LBS

## 2021-05-18 DIAGNOSIS — Z95.810 BIVENTRICULAR ICD (IMPLANTABLE CARDIOVERTER-DEFIBRILLATOR) IN PLACE: ICD-10-CM

## 2021-05-18 DIAGNOSIS — I25.83 CORONARY ARTERY DISEASE DUE TO LIPID RICH PLAQUE: ICD-10-CM

## 2021-05-18 DIAGNOSIS — I50.22 SYSTOLIC CHF, CHRONIC (HCC): ICD-10-CM

## 2021-05-18 DIAGNOSIS — E78.2 MIXED HYPERLIPIDEMIA: ICD-10-CM

## 2021-05-18 DIAGNOSIS — I48.0 PAROXYSMAL ATRIAL FIBRILLATION (HCC): ICD-10-CM

## 2021-05-18 DIAGNOSIS — I25.10 CORONARY ARTERY DISEASE DUE TO LIPID RICH PLAQUE: ICD-10-CM

## 2021-05-18 DIAGNOSIS — I10 ESSENTIAL HYPERTENSION, BENIGN: ICD-10-CM

## 2021-05-18 DIAGNOSIS — E66.01 SEVERE OBESITY (BMI 35.0-39.9) WITH COMORBIDITY (HCC): ICD-10-CM

## 2021-05-18 PROCEDURE — 93306 TTE W/DOPPLER COMPLETE: CPT | Performed by: INTERNAL MEDICINE

## 2021-05-18 RX ADMIN — PERFLUTREN 1 ML: 6.52 INJECTION, SUSPENSION INTRAVENOUS at 08:45

## 2021-05-19 LAB
ECHO AO ASC DIAM: 3.23 CM
ECHO AO ROOT DIAM: 3.51 CM
ECHO AV AREA PEAK VELOCITY: 1.83 CM2
ECHO AV AREA/BSA PEAK VELOCITY: 0.8 CM2/M2
ECHO AV PEAK GRADIENT: 5.79 MMHG
ECHO AV PEAK VELOCITY: 120.27 CM/S
ECHO LA AREA 4C: 24.25 CM2
ECHO LA MAJOR AXIS: 4.34 CM
ECHO LA MINOR AXIS: 1.98 CM
ECHO LA VOL 2C: 56.59 ML (ref 18–58)
ECHO LA VOL 4C: 74.32 ML (ref 18–58)
ECHO LA VOL BP: 70 ML (ref 18–58)
ECHO LA VOL/BSA BIPLANE: 31.96 ML/M2 (ref 16–28)
ECHO LA VOLUME INDEX A2C: 25.84 ML/M2 (ref 16–28)
ECHO LA VOLUME INDEX A4C: 33.94 ML/M2 (ref 16–28)
ECHO LV E' LATERAL VELOCITY: 7.23 CM/S
ECHO LV EDV A2C: 118.03 ML
ECHO LV EDV A4C: 59.3 ML
ECHO LV EDV BP: 86.67 ML (ref 67–155)
ECHO LV EDV INDEX A4C: 27.1 ML/M2
ECHO LV EDV INDEX BP: 39.6 ML/M2
ECHO LV EDV NDEX A2C: 53.9 ML/M2
ECHO LV EJECTION FRACTION A2C: 50 PERCENT
ECHO LV EJECTION FRACTION A4C: 1 PERCENT
ECHO LV EJECTION FRACTION BIPLANE: 31.7 PERCENT (ref 55–100)
ECHO LV ESV A2C: 58.68 ML
ECHO LV ESV A4C: 58.59 ML
ECHO LV ESV BP: 59.18 ML (ref 22–58)
ECHO LV ESV INDEX A2C: 26.8 ML/M2
ECHO LV ESV INDEX A4C: 26.8 ML/M2
ECHO LV ESV INDEX BP: 27 ML/M2
ECHO LV INTERNAL DIMENSION DIASTOLIC: 6.02 CM (ref 4.2–5.9)
ECHO LV INTERNAL DIMENSION SYSTOLIC: 4.88 CM
ECHO LV IVSD: 1.47 CM (ref 0.6–1)
ECHO LV MASS 2D: 397.7 G (ref 88–224)
ECHO LV MASS INDEX 2D: 181.6 G/M2 (ref 49–115)
ECHO LV POSTERIOR WALL DIASTOLIC: 1.37 CM (ref 0.6–1)
ECHO LVOT DIAM: 2.02 CM
ECHO LVOT PEAK GRADIENT: 1.89 MMHG
ECHO LVOT PEAK VELOCITY: 68.69 CM/S
ECHO LVOT SV: 49 ML
ECHO LVOT VTI: 15.28 CM
ECHO MV A VELOCITY: 95.6 CM/S
ECHO MV AREA PHT: 2.72 CM2
ECHO MV E DECELERATION TIME (DT): 278.85 MS
ECHO MV E VELOCITY: 53.68 CM/S
ECHO MV E/A RATIO: 0.56
ECHO MV E/E' LATERAL: 7.42
ECHO MV EROA PISA: 0.23 CM2
ECHO MV PRESSURE HALF TIME (PHT): 80.87 MS
ECHO MV REGURGITANT RADIUS PISA: 0.73 CM
ECHO MV REGURGITANT VOLUME: 32.4 ML
ECHO MV REGURGITANT VTIA: 141.32 CM
ECHO RV TAPSE: 2.06 CM (ref 1.5–2)
MR PISA PV: 470.31 CM/S

## 2021-05-21 ENCOUNTER — TELEPHONE (OUTPATIENT)
Dept: CARDIOLOGY CLINIC | Age: 71
End: 2021-05-21

## 2021-05-21 NOTE — TELEPHONE ENCOUNTER
Spoke with patient. Verified patient with two patient identifiers. Advised EF stable at 25-30%. Patient verbalized understanding.

## 2021-05-21 NOTE — TELEPHONE ENCOUNTER
----- Message from Maximo Ramos MD sent at 5/21/2021  2:39 PM EDT -----  Pumping function is the same, 25-30

## 2021-05-30 RX ORDER — ROSUVASTATIN CALCIUM 20 MG/1
TABLET, COATED ORAL
Qty: 90 TABLET | Refills: 0 | Status: SHIPPED | OUTPATIENT
Start: 2021-05-30 | End: 2021-08-27

## 2021-06-01 RX ORDER — EZETIMIBE 10 MG/1
TABLET ORAL
Qty: 90 TABLET | Refills: 2 | Status: SHIPPED | OUTPATIENT
Start: 2021-06-01 | End: 2022-01-20

## 2021-06-02 ENCOUNTER — TELEPHONE (OUTPATIENT)
Dept: CARDIOLOGY CLINIC | Age: 71
End: 2021-06-02

## 2021-06-02 NOTE — TELEPHONE ENCOUNTER
----- Message from Evangelista Edgar ANP sent at 5/30/2021  2:33 PM EDT -----  Pt needs labs. Has he had an recent lipids? If not I will order as I refilled his statin.   Thanks

## 2021-06-02 NOTE — TELEPHONE ENCOUNTER
Verified patient with 2 identifiers   Patient states he will be seeing pcp on 6/17/21  Believes he will be ordering lipids then.

## 2021-06-17 ENCOUNTER — OFFICE VISIT (OUTPATIENT)
Dept: FAMILY MEDICINE CLINIC | Age: 71
End: 2021-06-17
Payer: MEDICARE

## 2021-06-17 VITALS
HEIGHT: 68 IN | WEIGHT: 234.8 LBS | OXYGEN SATURATION: 97 % | TEMPERATURE: 98.4 F | SYSTOLIC BLOOD PRESSURE: 126 MMHG | BODY MASS INDEX: 35.58 KG/M2 | RESPIRATION RATE: 18 BRPM | DIASTOLIC BLOOD PRESSURE: 84 MMHG | HEART RATE: 70 BPM

## 2021-06-17 DIAGNOSIS — I48.0 PAROXYSMAL ATRIAL FIBRILLATION (HCC): ICD-10-CM

## 2021-06-17 DIAGNOSIS — I50.32 CHRONIC DIASTOLIC HF (HEART FAILURE) (HCC): ICD-10-CM

## 2021-06-17 DIAGNOSIS — Z95.5 S/P CORONARY ARTERY STENT PLACEMENT: ICD-10-CM

## 2021-06-17 DIAGNOSIS — E78.2 MIXED HYPERLIPIDEMIA: ICD-10-CM

## 2021-06-17 DIAGNOSIS — Z00.00 MEDICARE ANNUAL WELLNESS VISIT, SUBSEQUENT: Primary | ICD-10-CM

## 2021-06-17 DIAGNOSIS — R35.1 NOCTURIA: ICD-10-CM

## 2021-06-17 DIAGNOSIS — I42.9 CARDIOMYOPATHY, UNSPECIFIED TYPE (HCC): ICD-10-CM

## 2021-06-17 DIAGNOSIS — I10 ESSENTIAL HYPERTENSION, BENIGN: ICD-10-CM

## 2021-06-17 LAB
ANION GAP SERPL CALC-SCNC: 6 MMOL/L (ref 5–15)
BASOPHILS # BLD: 0 K/UL (ref 0–0.1)
BASOPHILS NFR BLD: 1 % (ref 0–1)
BILIRUB UR QL STRIP: NEGATIVE
BUN SERPL-MCNC: 14 MG/DL (ref 6–20)
BUN/CREAT SERPL: 10 (ref 12–20)
CALCIUM SERPL-MCNC: 9.4 MG/DL (ref 8.5–10.1)
CHLORIDE SERPL-SCNC: 109 MMOL/L (ref 97–108)
CHOLEST SERPL-MCNC: 148 MG/DL
CO2 SERPL-SCNC: 26 MMOL/L (ref 21–32)
CREAT SERPL-MCNC: 1.34 MG/DL (ref 0.7–1.3)
DIFFERENTIAL METHOD BLD: ABNORMAL
EOSINOPHIL # BLD: 0.2 K/UL (ref 0–0.4)
EOSINOPHIL NFR BLD: 3 % (ref 0–7)
ERYTHROCYTE [DISTWIDTH] IN BLOOD BY AUTOMATED COUNT: 12.8 % (ref 11.5–14.5)
GLUCOSE SERPL-MCNC: 91 MG/DL (ref 65–100)
GLUCOSE UR-MCNC: NEGATIVE MG/DL
HCT VFR BLD AUTO: 44.5 % (ref 36.6–50.3)
HDLC SERPL-MCNC: 69 MG/DL
HDLC SERPL: 2.1 {RATIO} (ref 0–5)
HGB BLD-MCNC: 14.5 G/DL (ref 12.1–17)
IMM GRANULOCYTES # BLD AUTO: 0 K/UL (ref 0–0.04)
IMM GRANULOCYTES NFR BLD AUTO: 0 % (ref 0–0.5)
KETONES P FAST UR STRIP-MCNC: NEGATIVE MG/DL
LDLC SERPL CALC-MCNC: 62.4 MG/DL (ref 0–100)
LYMPHOCYTES # BLD: 1.3 K/UL (ref 0.8–3.5)
LYMPHOCYTES NFR BLD: 20 % (ref 12–49)
MCH RBC QN AUTO: 32.6 PG (ref 26–34)
MCHC RBC AUTO-ENTMCNC: 32.6 G/DL (ref 30–36.5)
MCV RBC AUTO: 100 FL (ref 80–99)
MONOCYTES # BLD: 0.4 K/UL (ref 0–1)
MONOCYTES NFR BLD: 7 % (ref 5–13)
NEUTS SEG # BLD: 4.4 K/UL (ref 1.8–8)
NEUTS SEG NFR BLD: 69 % (ref 32–75)
NRBC # BLD: 0 K/UL (ref 0–0.01)
NRBC BLD-RTO: 0 PER 100 WBC
PH UR STRIP: 7 [PH] (ref 4.6–8)
PLATELET # BLD AUTO: 155 K/UL (ref 150–400)
PMV BLD AUTO: 11 FL (ref 8.9–12.9)
POTASSIUM SERPL-SCNC: 4.3 MMOL/L (ref 3.5–5.1)
PROT UR QL STRIP: NEGATIVE
PSA SERPL-MCNC: 3.9 NG/ML (ref 0.01–4)
RBC # BLD AUTO: 4.45 M/UL (ref 4.1–5.7)
SODIUM SERPL-SCNC: 141 MMOL/L (ref 136–145)
SP GR UR STRIP: 1.02 (ref 1–1.03)
TRIGL SERPL-MCNC: 83 MG/DL (ref ?–150)
UA UROBILINOGEN AMB POC: NORMAL (ref 0.2–1)
URINALYSIS CLARITY POC: CLEAR
URINALYSIS COLOR POC: YELLOW
URINE BLOOD POC: NORMAL
URINE LEUKOCYTES POC: NORMAL
URINE NITRITES POC: NEGATIVE
VLDLC SERPL CALC-MCNC: 16.6 MG/DL
WBC # BLD AUTO: 6.4 K/UL (ref 4.1–11.1)

## 2021-06-17 PROCEDURE — 3017F COLORECTAL CA SCREEN DOC REV: CPT | Performed by: FAMILY MEDICINE

## 2021-06-17 PROCEDURE — 1101F PT FALLS ASSESS-DOCD LE1/YR: CPT | Performed by: FAMILY MEDICINE

## 2021-06-17 PROCEDURE — G8417 CALC BMI ABV UP PARAM F/U: HCPCS | Performed by: FAMILY MEDICINE

## 2021-06-17 PROCEDURE — G8536 NO DOC ELDER MAL SCRN: HCPCS | Performed by: FAMILY MEDICINE

## 2021-06-17 PROCEDURE — G0463 HOSPITAL OUTPT CLINIC VISIT: HCPCS | Performed by: FAMILY MEDICINE

## 2021-06-17 PROCEDURE — 81003 URINALYSIS AUTO W/O SCOPE: CPT | Performed by: FAMILY MEDICINE

## 2021-06-17 PROCEDURE — G8427 DOCREV CUR MEDS BY ELIG CLIN: HCPCS | Performed by: FAMILY MEDICINE

## 2021-06-17 PROCEDURE — G0438 PPPS, INITIAL VISIT: HCPCS | Performed by: FAMILY MEDICINE

## 2021-06-17 PROCEDURE — G8752 SYS BP LESS 140: HCPCS | Performed by: FAMILY MEDICINE

## 2021-06-17 PROCEDURE — G8754 DIAS BP LESS 90: HCPCS | Performed by: FAMILY MEDICINE

## 2021-06-17 PROCEDURE — G8510 SCR DEP NEG, NO PLAN REQD: HCPCS | Performed by: FAMILY MEDICINE

## 2021-06-17 PROCEDURE — 99213 OFFICE O/P EST LOW 20 MIN: CPT | Performed by: FAMILY MEDICINE

## 2021-06-17 NOTE — PROGRESS NOTES
Chief Complaint   Patient presents with    Well Male     Pt getting annual medicare wellness exam.     1. Have you been to the ER, urgent care clinic since your last visit? Hospitalized since your last visit? No    2. Have you seen or consulted any other health care providers outside of the [address] [place] [address] since your last visit? Include any pap smears or colon screening.  No

## 2021-06-17 NOTE — PROGRESS NOTES
This is the Subsequent Medicare Annual Wellness Exam, performed 12 months or more after the Initial AWV or the last Subsequent AWV    I have reviewed the patient's medical history in detail and updated the computerized patient record. Assessment/Plan   Education and counseling provided:  Are appropriate based on today's review and evaluation    1. Medicare annual wellness visit, subsequent  2. Essential hypertension, benign  -     METABOLIC PANEL, BASIC; Future  -     CBC WITH AUTOMATED DIFF; Future  3. Cardiomyopathy, unspecified type ([facility] [address]  4. Chronic diastolic HF (heart failure) ([facility] [address]  5. S/P coronary artery stent placement  6. Mixed hyperlipidemia  -     LIPID PANEL; Future  7. Paroxysmal atrial fibrillation (HCC)  8. Nocturia  -     PSA, DIAGNOSTIC (PROSTATE SPECIFIC AG); Future  -     AMB POC URINALYSIS DIP STICK AUTO W/O MICRO       Depression Risk Factor Screening     3 most recent PHQ Screens 6/17/2021   Little interest or pleasure in doing things Not at all   Feeling down, depressed, irritable, or hopeless Not at all   Total Score PHQ 2 0   Trouble falling or staying asleep, or sleeping too much -   Feeling tired or having little energy -   Poor appetite, weight loss, or overeating -   Feeling bad about yourself - or that you are a failure or have let yourself or your family down -   Trouble concentrating on things such as school, work, reading, or watching TV -   Moving or speaking so slowly that other people could have noticed; or the opposite being so fidgety that others notice -   Thoughts of being better off dead, or hurting yourself in some way -   PHQ 9 Score -       Alcohol Risk Screen    Do you average more than 1 drink per night or more than 7 drinks a week: No    In the past three months have you have had more than 4 drinks containing alcohol on one occasion: No        Functional Ability and Level of Safety    Hearing: Hearing is good. Activities of Daily Living:   The home contains: handrails and grab bars  Patient does total self care      Ambulation: with no difficulty     Fall Risk:  Fall Risk Assessment, last 12 mths 2021   Able to walk? Yes   Fall in past 12 months? 0   Do you feel unsteady?  0   Are you worried about falling 0      Abuse Screen:  Patient is not abused       Cognitive Screening    Has your family/caregiver stated any concerns about your memory: no     Cognitive Screenin           Review of Systems - General ROS: negative  Ophthalmic ROS: negative  ENT ROS: negative  Respiratory ROS: no cough, shortness of breath, or wheezing  Cardiovascular ROS: no chest pain or dyspnea on exertion  Gastrointestinal ROS: no abdominal pain, change in bowel habits, or black or bloody stools  Genito-Urinary ROS: no dysuria, trouble voiding, or hematuria  Musculoskeletal ROS: negative  Neurological ROS: no TIA or stroke symptoms        General appearance - alert, well appearing, and in no distress  Mental status - alert, oriented to person, place, and time  Eyes - pupils equal and reactive, extraocular eye movements intact  Ears - bilateral TM's and external ear canals normal  Nose - normal and patent, no erythema, discharge or polyps  Mouth - mucous membranes moist, pharynx normal without lesions  Neck - supple, no significant adenopathy, carotids upstroke normal bilaterally, no bruits, thyroid exam: thyroid is normal in size without nodules or tenderness  Chest - clear to auscultation, no wheezes, rales or rhonchi, symmetric air entry  Heart - normal rate, regular rhythm, normal S1, S2, no murmurs, rubs, clicks or gallops  Abdomen - soft, nontender, nondistended, no masses or organomegaly  Rectal - negative without mass, lesions or tenderness, sphincter tone normal, stool guaiac negative, PROSTATE EXAM: smooth and symmetric without nodules or tenderness  Neurological - alert, oriented, normal speech, no focal findings or movement disorder noted  Musculoskeletal - no joint tenderness, deformity or swelling  Extremities - peripheral pulses normal, no pedal edema, no clubbing or cyanosis        Health Maintenance Due     Health Maintenance Due   Topic Date Due    COVID-19 Vaccine (1) Never done    Shingrix Vaccine Age 50> (1 of 2) Never done    Colorectal Cancer Screening Combo  05/14/2017    Pneumococcal 65+ years (2 of 2 - PPSV23) 08/15/2019    Medicare Yearly Exam  08/16/2019    Lipid Screen  04/29/2021       Patient Care Team   Patient Care Team:  Fredick Goodpasture, MD as PCP - [address] [place] [address], Laura Lopez MD as PCP - [facility]  Irene Velazquez, RN  Malena Bella MD as Physician (Cardiology)  Priscilla Rosen MD as Physician (Cardiology)    History     Patient Active Problem List   Diagnosis Code    CAD (coronary artery disease) I25.10    Mixed hyperlipidemia E78.2    Cardiomyopathy ([facility] [address] I42.9    Esophageal reflux K21.9    Encounter for long-term (current) use of other medications Z79.899    Nocturia R35.1    Abnormal PSA R97.20    Essential hypertension, benign I10    GERD (gastroesophageal reflux disease) K21.9    Hematochezia K92.1    Screen for colon cancer Z12.11    Automatic implantable cardioverter-defibrillator in situ Z95.810    Low back pain M54.5    Severe obesity (BMI 35.0-39. 9) with comorbidity ([facility]) E66.01    A-fib ([facility] [address] I48.91    Presence of biventricular AICD Z95.810    Abnormal stress ECG R94.39    VT (ventricular tachycardia) ([facility]) I47.2    Chronic diastolic HF (heart failure) ([facility]) C61.56    Systolic CHF, chronic ([facility]) I50.22    S/P coronary artery stent placement Z95.5    VF (ventricular fibrillation) ([facility]) I49.01     Past Medical History:   Diagnosis Date    A-fib ([address] 8/10/2018    Abnormal PSA 2/16/2011    Abnormal stress ECG 8/10/2018    CAD (coronary artery disease) 2/16/2011    Cardiomyopathy ([address] 2/16/2011    Chronic diastolic HF (heart failure) ([facility] [address] 8/10/2018    Encounter for long-term (current) use of other medications 2/16/2011    Esophageal reflux 2/16/2011    Essential hypertension, benign 2/16/2011    GERD (gastroesophageal reflux disease) 5/14/2012    Heart attack ([facility] [address]     Low back pain 7/2/2013    Mixed hyperlipidemia 2/16/2011    Nocturia 2/16/2011    Pacemaker     Presence of biventricular AICD 8/10/2018    [employer] BIVICD implanted 5/2017    S/P coronary artery stent placement 8/10/2018    8/9/18 PCI/SUREKHA to LAD, RCA x 2    Systolic CHF, chronic ([facility] [address] 8/10/2018    VF (ventricular fibrillation) (formerly [facility]) 8/10/2018    VT (ventricular tachycardia) ([facility] [address] 8/10/2018      Past Surgical History:   Procedure Laterality Date    HX CERVICAL FUSION  1997    c4,5,6    HX PACEMAKER      MO COLONOSCOPY FLX DX W/COLLJ SPEC WHEN PFRMD  5/14/2012         MO EGD TRANSORAL BIOPSY SINGLE/MULTIPLE  5/14/2012         MO REMOVAL GALLBLADDER       Current Outpatient Medications   Medication Sig Dispense Refill    ezetimibe (ZETIA) 10 mg tablet TAKE 1 TABLET EVERY DAY TO LOWER CHOLESTEROL 90 Tablet 2    tamsulosin (FLOMAX) 0.4 mg capsule TAKE 1 CAPSULE EVERY DAY 90 Capsule 0    rosuvastatin (CRESTOR) 20 mg tablet TAKE 1 TABLET BY MOUTH DAILY FOR CHOLESTEROL BEST IN THE EVENING 90 Tablet 0    carvediloL (COREG) 25 mg tablet TAKE 1 TABLET BY MOUTH TWO (2) TIMES DAILY (WITH MEALS). 60 Tab 2    Eliquis 5 mg tablet TAKE 1 TABLET BY MOUTH TWO (2) TIMES A DAY. FOR BLOOD THINNER 180 Tab 3    amiodarone (CORDARONE) 200 mg tablet TAKE 1 TABLET EVERY DAY FOR HEART RYTHYM 90 Tab 2    furosemide (LASIX) 20 mg tablet TAKE 1 TABLET EVERY MORNING FOR FLUID 90 Tab 3    aspirin delayed-release 81 mg tablet Take  by mouth daily.  cyclobenzaprine (FLEXERIL) 10 mg tablet Take 1 Tab by mouth three (3) times daily as needed for Muscle Spasm(s). 30 Tab 3    captopril (CAPOTEN) 12.5 mg tablet Take 1 Tab by mouth two (2) times a day.  180 Tab 3    nitroglycerin (NITROLINGUAL) 400 mcg/spray spray 1 Spray by SubLINGual route every five (5) minutes as needed. 1 Bottle 1    cyanocobalamin 1,000 mcg tablet Take 1,000 mcg by mouth daily.  omega-3-dha-epa-dpa-fish oil 1,050-1,200 mg cap Take 1 Cap by mouth two (2) times a day.  cholecalciferol, vitamin d3, (VITAMIN D) 1,000 unit tablet Take  by mouth daily.  multivitamins-minerals-lutein (CENTRUM SILVER) Tab Take 1 Tab by mouth daily.        No Known Allergies    Family History   Problem Relation Age of Onset    Lung Disease Mother     Cancer Mother     Alcohol abuse Father     Heart Disease Father      Social History     Tobacco Use    Smoking status: Never Smoker    Smokeless tobacco: Never Used   Substance Use Topics    Alcohol use: No         Indy Howe MD

## 2021-08-10 ENCOUNTER — OFFICE VISIT (OUTPATIENT)
Dept: CARDIOLOGY CLINIC | Age: 71
End: 2021-08-10
Payer: MEDICARE

## 2021-08-10 DIAGNOSIS — I25.5 ISCHEMIC CARDIOMYOPATHY: ICD-10-CM

## 2021-08-10 DIAGNOSIS — Z95.810 BIVENTRICULAR ICD (IMPLANTABLE CARDIOVERTER-DEFIBRILLATOR) IN PLACE: Primary | ICD-10-CM

## 2021-08-10 PROCEDURE — 93284 PRGRMG EVAL IMPLANTABLE DFB: CPT | Performed by: INTERNAL MEDICINE

## 2021-11-10 ENCOUNTER — OFFICE VISIT (OUTPATIENT)
Dept: CARDIOLOGY CLINIC | Age: 71
End: 2021-11-10
Payer: MEDICARE

## 2021-11-10 VITALS
OXYGEN SATURATION: 98 % | WEIGHT: 239 LBS | HEIGHT: 68 IN | DIASTOLIC BLOOD PRESSURE: 80 MMHG | BODY MASS INDEX: 36.22 KG/M2 | HEART RATE: 70 BPM | SYSTOLIC BLOOD PRESSURE: 122 MMHG

## 2021-11-10 DIAGNOSIS — E78.2 MIXED HYPERLIPIDEMIA: ICD-10-CM

## 2021-11-10 DIAGNOSIS — I42.9 CARDIOMYOPATHY, UNSPECIFIED TYPE (HCC): ICD-10-CM

## 2021-11-10 DIAGNOSIS — I10 ESSENTIAL HYPERTENSION, BENIGN: ICD-10-CM

## 2021-11-10 DIAGNOSIS — I25.10 CORONARY ARTERY DISEASE INVOLVING NATIVE CORONARY ARTERY OF NATIVE HEART WITHOUT ANGINA PECTORIS: Primary | ICD-10-CM

## 2021-11-10 DIAGNOSIS — Z95.810 BIVENTRICULAR ICD (IMPLANTABLE CARDIOVERTER-DEFIBRILLATOR) IN PLACE: ICD-10-CM

## 2021-11-10 DIAGNOSIS — Z95.810 BIVENTRICULAR ICD (IMPLANTABLE CARDIOVERTER-DEFIBRILLATOR) IN PLACE: Primary | ICD-10-CM

## 2021-11-10 DIAGNOSIS — I50.22 SYSTOLIC CHF, CHRONIC (HCC): ICD-10-CM

## 2021-11-10 PROCEDURE — G8752 SYS BP LESS 140: HCPCS | Performed by: INTERNAL MEDICINE

## 2021-11-10 PROCEDURE — G8536 NO DOC ELDER MAL SCRN: HCPCS | Performed by: INTERNAL MEDICINE

## 2021-11-10 PROCEDURE — 93284 PRGRMG EVAL IMPLANTABLE DFB: CPT | Performed by: INTERNAL MEDICINE

## 2021-11-10 PROCEDURE — G8417 CALC BMI ABV UP PARAM F/U: HCPCS | Performed by: INTERNAL MEDICINE

## 2021-11-10 PROCEDURE — 93005 ELECTROCARDIOGRAM TRACING: CPT | Performed by: INTERNAL MEDICINE

## 2021-11-10 PROCEDURE — 93010 ELECTROCARDIOGRAM REPORT: CPT | Performed by: INTERNAL MEDICINE

## 2021-11-10 PROCEDURE — 99214 OFFICE O/P EST MOD 30 MIN: CPT | Performed by: INTERNAL MEDICINE

## 2021-11-10 PROCEDURE — G0463 HOSPITAL OUTPT CLINIC VISIT: HCPCS | Performed by: INTERNAL MEDICINE

## 2021-11-10 PROCEDURE — G8427 DOCREV CUR MEDS BY ELIG CLIN: HCPCS | Performed by: INTERNAL MEDICINE

## 2021-11-10 PROCEDURE — 1101F PT FALLS ASSESS-DOCD LE1/YR: CPT | Performed by: INTERNAL MEDICINE

## 2021-11-10 PROCEDURE — G8754 DIAS BP LESS 90: HCPCS | Performed by: INTERNAL MEDICINE

## 2021-11-10 PROCEDURE — G8510 SCR DEP NEG, NO PLAN REQD: HCPCS | Performed by: INTERNAL MEDICINE

## 2021-11-10 PROCEDURE — 3017F COLORECTAL CA SCREEN DOC REV: CPT | Performed by: INTERNAL MEDICINE

## 2021-11-10 RX ORDER — SACUBITRIL AND VALSARTAN 24; 26 MG/1; MG/1
1 TABLET, FILM COATED ORAL 2 TIMES DAILY
Qty: 60 TABLET | Refills: 5 | Status: SHIPPED | OUTPATIENT
Start: 2021-11-10 | End: 2022-04-25 | Stop reason: SDUPTHER

## 2021-11-10 RX ORDER — SACUBITRIL AND VALSARTAN 24; 26 MG/1; MG/1
1 TABLET, FILM COATED ORAL 2 TIMES DAILY
Qty: 28 TABLET | Refills: 0 | Status: SHIPPED | COMMUNITY
Start: 2021-11-10 | End: 2021-12-22

## 2021-11-10 NOTE — LETTER
11/10/2021    Patient: Ena Clements   YOB: 1950   Date of Visit: 11/10/2021     David Matias, [address] [address]  [address] [facility] [address]    Dear David Matias MD,      Thank you for referring Mr. Regina Pulido to [facility] [address] for evaluation. My notes for this consultation are attached. If you have questions, please do not hesitate to call me. I look forward to following your patient along with you.       Sincerely,    Fang Villafana MD

## 2021-11-10 NOTE — PROGRESS NOTES
[address] [place] [address], [address] [facility]  [phone]     Subjective:      Julio César Cruz is a 66 y.o. male is here for a f/u appt. Last seen in office 5/7/21. He has a PMHx of CAD, ischemic cardiomyopathy s/p BiV ICD, PAF, HTN and HLD. He denies chest pain/ shortness of breath, orthopnea, PND, LE edema, palpitations, syncope, or presyncope. He is walking routinely, drinking more water. Feels good. Patient Active Problem List    Diagnosis Date Noted    [facility]) 08/10/2018    Presence of biventricular AICD 08/10/2018    Abnormal stress ECG 08/10/2018    [facility]) ([facility]) 08/10/2018    Chronic diastolic HF (heart failure) ([facility] 70/71/9429    Systolic CHF, chronic ([address] 08/10/2018    S/P coronary artery stent placement 08/10/2018    VF (ventricular fibrillation) ([address] 08/10/2018    Severe obesity (BMI 35.0-39. 9) with comorbidity ([address] 04/19/2018    Low back pain 07/02/2013    Automatic implantable cardioverter-defibrillator in situ 06/11/2012    GERD (gastroesophageal reflux disease) 05/14/2012    Hematochezia 05/14/2012    Screen for colon cancer 05/14/2012    CAD (coronary artery disease) 02/16/2011    Mixed hyperlipidemia 02/16/2011    Cardiomyopathy ([address] 02/16/2011    Encounter for long-term (current) use of other medications 02/16/2011    Nocturia 02/16/2011    Abnormal PSA 02/16/2011    Essential hypertension, benign 02/16/2011      Svetlana Garcia MD  Past Medical History:   Diagnosis Date    [facility]) 8/10/2018    Abnormal PSA 2/16/2011    Abnormal stress ECG 8/10/2018    CAD (coronary artery disease) 2/16/2011    Cardiomyopathy ([address] 2/16/2011    Chronic diastolic HF (heart failure) ([address] 8/10/2018    Encounter for long-term (current) use of other medications 2/16/2011    Esophageal reflux 2/16/2011    Essential hypertension, benign 2/16/2011    GERD (gastroesophageal reflux disease) 5/14/2012    [facility]  Low back pain 7/2/2013    Mixed hyperlipidemia 2/16/2011    Nocturia 2/16/2011    Pacemaker     Presence of biventricular AICD 8/10/2018    [employer] BIVICD implanted 5/2017    S/P coronary artery stent placement 8/10/2018    8/9/18 PCI/SUREKHA to LAD, RCA x 2    Systolic CHF, chronic (HCC) 8/10/2018    VF (ventricular fibrillation) ([facility] 8/10/2018    VT (ventricular tachycardia) ([facility] 8/10/2018      Past Surgical History:   Procedure Laterality Date    HX CERVICAL FUSION  1997    c4,5,6    HX PACEMAKER      [facility] COLONOSCOPY FLX DX W/COLLJ SPEC WHEN PFRMD  5/14/2012         [facility] EGD TRANSORAL BIOPSY SINGLE/MULTIPLE  5/14/2012         VA REMOVAL GALLBLADDER       No Known Allergies   Family History   Problem Relation Age of Onset    Lung Disease Mother     Cancer Mother     Alcohol abuse Father     Heart Disease Father       Social History     Socioeconomic History    Marital status:      Spouse name: Not on file    Number of children: Not on file    Years of education: Not on file    Highest education level: Not on file   Occupational History    Not on file   Tobacco Use    Smoking status: Never Smoker    Smokeless tobacco: Never Used   Vaping Use    Vaping Use: Never used   Substance and Sexual Activity    Alcohol use: No    Drug use: No    Sexual activity: Not on file   Other Topics Concern    Not on file   Social History Narrative    Not on file     Social Determinants of Health     Financial Resource Strain:     Difficulty of Paying Living Expenses: Not on file   Food Insecurity:     Worried About Running Out of Food in the Last Year: Not on file    Ramon of Food in the Last Year: Not on file   Transportation Needs:     Lack of Transportation (Medical): Not on file    Lack of Transportation (Non-Medical):  Not on file   Physical Activity:     Days of Exercise per Week: Not on file    Minutes of Exercise per Session: Not on file   Stress:     Feeling of Stress : Not on file   Social Connections:     Frequency of Communication with Friends and Family: Not on file    Frequency of Social Gatherings with Friends and Family: Not on file    Attends Synagogue Services: Not on file    Active Member of Clubs or Organizations: Not on file    Attends Club or Organization Meetings: Not on file    Marital Status: Not on file   Intimate Partner Violence:     Fear of Current or Ex-Partner: Not on file    Emotionally Abused: Not on file    Physically Abused: Not on file    Sexually Abused: Not on file   Housing Stability:     Unable to Pay for Housing in the Last Year: Not on file    Number of [place] in the Last Year: Not on file    Unstable Housing in the Last Year: Not on file      Current Outpatient Medications   Medication Sig    sacubitriL-valsartan (Entresto) 24-26 mg tablet Take 1 Tablet by mouth two (2) times a day.  sacubitriL-valsartan (Entresto) 24-26 mg tablet Take 1 Tablet by mouth two (2) times a day.  furosemide (LASIX) 20 mg tablet TAKE 1 TABLET BY MOUTH EVERY MORNING FOR FLUID    tamsulosin (FLOMAX) 0.4 mg capsule TAKE 1 CAPSULE EVERY DAY    rosuvastatin (CRESTOR) 20 mg tablet TAKE 1 TABLET BY MOUTH DAILY FOR CHOLESTEROL BEST IN THE EVENING    carvediloL (COREG) 25 mg tablet TAKE 1 TABLET BY MOUTH TWO (2) TIMES DAILY (WITH MEALS).  ezetimibe (ZETIA) 10 mg tablet TAKE 1 TABLET EVERY DAY TO LOWER CHOLESTEROL    Eliquis 5 mg tablet TAKE 1 TABLET BY MOUTH TWO (2) TIMES A DAY. FOR BLOOD THINNER    amiodarone (CORDARONE) 200 mg tablet TAKE 1 TABLET EVERY DAY FOR HEART RYTHYM (Patient taking differently: 100 mg.)    aspirin delayed-release 81 mg tablet Take  by mouth daily.  cyclobenzaprine (FLEXERIL) 10 mg tablet Take 1 Tab by mouth three (3) times daily as needed for Muscle Spasm(s).  nitroglycerin (NITROLINGUAL) 400 mcg/spray spray 1 Spray by SubLINGual route every five (5) minutes as needed.     cyanocobalamin 1,000 mcg tablet Take 1,000 mcg by mouth daily.  omega-3-dha-epa-dpa-fish oil 1,050-1,200 mg cap Take 1 Cap by mouth two (2) times a day.  cholecalciferol, vitamin d3, (VITAMIN D) 1,000 unit tablet Take  by mouth daily.  multivitamins-minerals-lutein (CENTRUM SILVER) Tab Take 1 Tab by mouth daily. No current facility-administered medications for this visit. Review of Symptoms:  11 systems reviewed, negative other than as stated in the HPI    Physical ExamPhysical Exam:    Vitals:    11/10/21 0901   BP: 122/80   Pulse: 70   SpO2: 98%   Weight: 239 lb (108.4 kg)   Height: 5' 8\" (1.727 m)     Body mass index is 36.34 kg/m². General PE  Gen:  NAD  Mental Status - Alert. General Appearance - Not in acute distress. HEENT:  PERRL, no carotid bruits or JVD  Chest and Lung Exam   Inspection: Accessory muscles - No use of accessory muscles in breathing. Auscultation:   Breath sounds: - Normal.   Cardiovascular   Inspection: Jugular vein - Bilateral - Inspection Normal.   Palpation/Percussion:   Apical Impulse: - Normal.   Auscultation: Rhythm - Regular. Heart Sounds - S1 WNL and S2 WNL. No S3 or S4. Murmurs & Other Heart Sounds: Auscultation of the heart reveals - No Murmurs. Peripheral Vascular   Upper Extremity: Inspection - Bilateral - No Cyanotic nailbeds or Digital clubbing. Lower Extremity:   Palpation: Edema - Bilateral - No edema. Abdomen:   Soft, non-tender, bowel sounds are active.   Neuro: A&O times 3, CN and motor grossly WNL    Labs:   Lab Results   Component Value Date/Time    Cholesterol, total 148 06/17/2021 10:20 AM    Cholesterol, total 127 04/29/2020 08:12 AM    Cholesterol, total 138 02/26/2019 02:24 PM    Cholesterol, total 111 08/15/2018 02:53 PM    Cholesterol, total 128 07/16/2015 11:18 AM    HDL Cholesterol 69 06/17/2021 10:20 AM    HDL Cholesterol 49 04/29/2020 08:12 AM    HDL Cholesterol 52 02/26/2019 02:24 PM    HDL Cholesterol 52 07/16/2015 11:18 AM    HDL Cholesterol 40 08/25/2014 08:56 AM    LDL, calculated 62.4 06/17/2021 10:20 AM    LDL, calculated 62 04/29/2020 08:12 AM    LDL, calculated 68 02/26/2019 02:24 PM    LDL, calculated 59 07/16/2015 11:18 AM    LDL, calculated 63 08/25/2014 08:56 AM    Triglyceride 83 06/17/2021 10:20 AM    Triglyceride 81 04/29/2020 08:12 AM    Triglyceride 92 02/26/2019 02:24 PM    Triglyceride 87 07/16/2015 11:18 AM    Triglyceride 101 08/25/2014 08:56 AM    CHOL/HDL Ratio 2.1 06/17/2021 10:20 AM     No results found for: CPK, CPKX, CPX  Lab Results   Component Value Date/Time    Sodium 141 06/17/2021 10:20 AM    Potassium 4.3 06/17/2021 10:20 AM    Chloride 109 (H) 06/17/2021 10:20 AM    CO2 26 06/17/2021 10:20 AM    Anion gap 6 06/17/2021 10:20 AM    Glucose 91 06/17/2021 10:20 AM    BUN 14 06/17/2021 10:20 AM    Creatinine 1.34 (H) 06/17/2021 10:20 AM    BUN/Creatinine ratio 10 (L) 06/17/2021 10:20 AM    GFR est AA >60 06/17/2021 10:20 AM    GFR est non-AA 52 (L) 06/17/2021 10:20 AM    Calcium 9.4 06/17/2021 10:20 AM    Bilirubin, total 1.5 (H) 11/04/2020 10:21 AM    Alk. phosphatase 50 11/04/2020 10:21 AM    Protein, total 6.2 11/04/2020 10:21 AM    Albumin 4.7 11/04/2020 10:21 AM    Globulin 2.3 07/29/2010 02:23 PM    A-G Ratio 3.1 (H) 11/04/2020 10:21 AM    ALT (SGPT) 27 11/04/2020 10:21 AM       EKG:  AV Paced     Assessment:      1. Coronary artery disease involving native coronary artery of native heart without angina pectoris    2. Systolic CHF, chronic ([address]    3. Essential hypertension, benign    4. Mixed hyperlipidemia    5. Biventricular ICD (implantable cardioverter-defibrillator) in place        Orders Placed This Encounter    AMB POC EKG ROUTINE W/ 12 LEADS, INTER & REP     Order Specific Question:   Reason for Exam:     Answer:   6 month cardiomyopathy    sacubitriL-valsartan (Entresto) 24-26 mg tablet     Sig: Take 1 Tablet by mouth two (2) times a day.      Dispense:  60 Tablet     Refill:  5    sacubitriL-valsartan (Entresto) 24-26 mg tablet Sig: Take 1 Tablet by mouth two (2) times a day. Dispense:  28 Tablet     Refill:  0     Order Specific Question:   Expiration Date     Answer:   5/31/2023     Comments:   24-26mg     Order Specific Question:   Lot#     Answer:   [id number]     Comments:   28 tabs     Order Specific Question:        Answer:   [employer]        Plan:     Coronary artery disease involving native coronary artery of native heart without angina pectoris  Hx SUREKHA to the dRCA and mLAD in 8/2018. Denies angina or anginal equivalent symptoms. EKG AV paced. Continue medical management with Coreg 25mg bid, ASA and Crestor [id number]      Systolic CHF, chronic; BiV-ICD upgrade 5/17  Per echo 12/1/2020 EF decreased to 25-30%. 5/2017 EF 25-30%. Echo  9/2017 EF 50-55% with grade 1 DD with mild MR. Echo 5/2021 EF 25-30%, mild MR. He is asymptomatic. Weight trended up to 239lbs, admits to dietary indiscretions. Discussed changed Captopril to [place] and he will try the med. Start 24/26mg bid, cont  Coreg and Lasix      Paroxysmal atrial fibrillation   Had recurrent afib episode 10/2020 with [facility] 11/12/2020 started on Amiodarone. Device followed by Dr Lissette Gomez, per device interrogation today no recurrence  continue with Eliquis therapy and Coreg and Amiodarone     Mixed hyperlipidemia  4/2020 LDL 62. 6/17/21 LDL 62. On statin Labs and lipids per PCP       Essential hypertension, benign  BP controlled. Continue anti-hypertensive therapy and low sodium diet       Patient seen and examined by me with nurse practitioner. Wendy Aquino personally performed all components of the history, physical, and medical decision making and agree with the assessment and plan with minor modifications as noted.     He is willing to switch to entresto  F/U 1 month

## 2021-11-22 RX ORDER — ROSUVASTATIN CALCIUM 20 MG/1
TABLET, COATED ORAL
Qty: 90 TABLET | Refills: 2 | Status: SHIPPED | OUTPATIENT
Start: 2021-11-22

## 2021-12-21 NOTE — PROGRESS NOTES
[address], [address] [facility]  [phone]     Subjective:      Ha Cardoso is a 66 y.o. male is here for a f/u appt. He has a PMHx of CAD, ischemic cardiomyopathy s/p BiV ICD, PAF, HTN and HLD. Last OV 11/10/2021: At that time we dc his captopril and switched him to [facility],    Today,  Feeling well actually feeling better. He is able to walk longer distance without sob. The patient denies chest pain/ shortness of breath, orthopnea, PND, LE edema, palpitations, syncope, or presyncope. Patient Active Problem List    Diagnosis Date Noted    [facility]) 08/10/2018    Presence of biventricular AICD 08/10/2018    Abnormal stress ECG 08/10/2018    VT (ventricular tachycardia) (HCC) 08/10/2018    Chronic diastolic HF (heart failure) ([address] 12/51/3142    Systolic CHF, chronic ([address] 08/10/2018    S/P coronary artery stent placement 08/10/2018    VF (ventricular fibrillation) ([address] 08/10/2018    Severe obesity (BMI 35.0-39. 9) with comorbidity ([address] 04/19/2018    Low back pain 07/02/2013    Automatic implantable cardioverter-defibrillator in situ 06/11/2012    GERD (gastroesophageal reflux disease) 05/14/2012    Hematochezia 05/14/2012    Screen for colon cancer 05/14/2012    CAD (coronary artery disease) 02/16/2011    Mixed hyperlipidemia 02/16/2011    Cardiomyopathy ([address] 02/16/2011    Encounter for long-term (current) use of other medications 02/16/2011    Nocturia 02/16/2011    Abnormal PSA 02/16/2011    Essential hypertension, benign 02/16/2011      Jessica Hensley MD  Past Medical History:   Diagnosis Date    [facility]) 8/10/2018    Abnormal PSA 2/16/2011    Abnormal stress ECG 8/10/2018    CAD (coronary artery disease) 2/16/2011    Cardiomyopathy ([address] 2/16/2011    Chronic diastolic HF (heart failure) ([address] 8/10/2018    Encounter for long-term (current) use of other medications 2/16/2011    Esophageal reflux 2/16/2011    Essential hypertension, benign 2/16/2011    GERD (gastroesophageal reflux disease) 5/14/2012    Heart attack ([facility]     Low back pain 7/2/2013    Mixed hyperlipidemia 2/16/2011    Nocturia 2/16/2011    Pacemaker     Presence of biventricular AICD 8/10/2018    [employer] BIVICD implanted 5/2017    S/P coronary artery stent placement 8/10/2018    8/9/18 PCI/SUREKHA to LAD, RCA x 2    Systolic CHF, chronic (HCC) 8/10/2018    VF (ventricular fibrillation) ([facility] 8/10/2018    VT (ventricular tachycardia) ([facility] 8/10/2018      Past Surgical History:   Procedure Laterality Date    HX CERVICAL FUSION  1997    c4,5,6    HX PACEMAKER      [place] COLONOSCOPY FLX DX W/COLLJ SPEC WHEN PFRMD  5/14/2012         [place] EGD TRANSORAL BIOPSY SINGLE/MULTIPLE  5/14/2012         [place] REMOVAL GALLBLADDER       No Known Allergies   Family History   Problem Relation Age of Onset    Lung Disease Mother     Cancer Mother     Alcohol abuse Father     Heart Disease Father       Social History     Socioeconomic History    Marital status:      Spouse name: Not on file    Number of children: Not on file    Years of education: Not on file    Highest education level: Not on file   Occupational History    Not on file   Tobacco Use    Smoking status: Never Smoker    Smokeless tobacco: Never Used   Vaping Use    Vaping Use: Never used   Substance and Sexual Activity    Alcohol use: No    Drug use: No    Sexual activity: Not on file   Other Topics Concern    Not on file   Social History Narrative    Not on file     Social Determinants of Health     Financial Resource Strain:     Difficulty of Paying Living Expenses: Not on file   Food Insecurity:     Worried About Running Out of Food in the Last Year: Not on file    Ramon of Food in the Last Year: Not on file   Transportation Needs:     Lack of Transportation (Medical): Not on file    Lack of Transportation (Non-Medical):  Not on file   Physical Activity:     Days of Exercise per Week: Not on file    Minutes of Exercise per Session: Not on file   Stress:     Feeling of Stress : Not on file   Social Connections:     Frequency of Communication with Friends and Family: Not on file    Frequency of Social Gatherings with Friends and Family: Not on file    Attends Hindu Services: Not on file    Active Member of [address] [place] [address] [employer] or Organizations: Not on file    Attends Club or Organization Meetings: Not on file    Marital Status: Not on file   Intimate Partner Violence:     Fear of Current or Ex-Partner: Not on file    Emotionally Abused: Not on file    Physically Abused: Not on file    Sexually Abused: Not on file   Housing Stability:     Unable to Pay for Housing in the Last Year: Not on file    Number of [place] in the Last Year: Not on file    Unstable Housing in the Last Year: Not on file      Current Outpatient Medications   Medication Sig    rosuvastatin (CRESTOR) 20 mg tablet TAKE 1 TABLET BY MOUTH DAILY FOR CHOLESTEROL BEST IN THE EVENING    sacubitriL-valsartan (Entresto) 24-26 mg tablet Take 1 Tablet by mouth two (2) times a day.  sacubitriL-valsartan (Entresto) 24-26 mg tablet Take 1 Tablet by mouth two (2) times a day.  furosemide (LASIX) 20 mg tablet TAKE 1 TABLET BY MOUTH EVERY MORNING FOR FLUID    tamsulosin (FLOMAX) 0.4 mg capsule TAKE 1 CAPSULE EVERY DAY    carvediloL (COREG) 25 mg tablet TAKE 1 TABLET BY MOUTH TWO (2) TIMES DAILY (WITH MEALS).  ezetimibe (ZETIA) 10 mg tablet TAKE 1 TABLET EVERY DAY TO LOWER CHOLESTEROL    Eliquis 5 mg tablet TAKE 1 TABLET BY MOUTH TWO (2) TIMES A DAY. FOR BLOOD THINNER    amiodarone (CORDARONE) 200 mg tablet TAKE 1 TABLET EVERY DAY FOR HEART RYTHYM (Patient taking differently: 100 mg.)    aspirin delayed-release 81 mg tablet Take  by mouth daily.  cyclobenzaprine (FLEXERIL) 10 mg tablet Take 1 Tab by mouth three (3) times daily as needed for Muscle Spasm(s).     nitroglycerin (NITROLINGUAL) 400 mcg/spray spray 1 Spray by SubLINGual route every five (5) minutes as needed.  cyanocobalamin 1,000 mcg tablet Take 1,000 mcg by mouth daily.  omega-3-dha-epa-dpa-fish oil 1,050-1,200 mg cap Take 1 Cap by mouth two (2) times a day.  cholecalciferol, vitamin d3, (VITAMIN D) 1,000 unit tablet Take  by mouth daily.  multivitamins-minerals-lutein (CENTRUM SILVER) Tab Take 1 Tab by mouth daily. No current facility-administered medications for this visit. Review of Symptoms:  11 systems reviewed, negative other than as stated in the HPI    Physical ExamPhysical Exam:    There were no vitals filed for this visit. There is no height or weight on file to calculate BMI. General PE  Gen:  NAD  Mental Status - Alert. General Appearance - Not in acute distress. HEENT:  PERRL, no carotid bruits or JVD  Chest and Lung Exam   Inspection: Accessory muscles - No use of accessory muscles in breathing. Auscultation:   Breath sounds: - Normal.   Cardiovascular   Inspection: Jugular vein - Bilateral - Inspection Normal.   Palpation/Percussion:   Apical Impulse: - Normal.   Auscultation: Rhythm - Regular. Heart Sounds - S1 WNL and S2 WNL. No S3 or S4. Murmurs & Other Heart Sounds: Auscultation of the heart reveals - No Murmurs. Peripheral Vascular   Upper Extremity: Inspection - Bilateral - No Cyanotic nailbeds or Digital clubbing. Lower Extremity:   Palpation: Edema - Bilateral - No edema. Abdomen:   Soft, non-tender, bowel sounds are active.   Neuro: A&O times 3, CN and motor grossly WNL    Labs:   Lab Results   Component Value Date/Time    Cholesterol, total 148 06/17/2021 10:20 AM    Cholesterol, total 127 04/29/2020 08:12 AM    Cholesterol, total 138 02/26/2019 02:24 PM    Cholesterol, total 111 08/15/2018 02:53 PM    Cholesterol, total 128 07/16/2015 11:18 AM    HDL Cholesterol 69 06/17/2021 10:20 AM    HDL Cholesterol 49 04/29/2020 08:12 AM    HDL Cholesterol 52 02/26/2019 02:24 PM    HDL Cholesterol 52 07/16/2015 11:18 AM    HDL Cholesterol 40 08/25/2014 08:56 AM    LDL, calculated 62.4 06/17/2021 10:20 AM    LDL, calculated 62 04/29/2020 08:12 AM    LDL, calculated 68 02/26/2019 02:24 PM    LDL, calculated 59 07/16/2015 11:18 AM    LDL, calculated 63 08/25/2014 08:56 AM    Triglyceride 83 06/17/2021 10:20 AM    Triglyceride 81 04/29/2020 08:12 AM    Triglyceride 92 02/26/2019 02:24 PM    Triglyceride 87 07/16/2015 11:18 AM    Triglyceride 101 08/25/2014 08:56 AM    CHOL/HDL Ratio 2.1 06/17/2021 10:20 AM     No results found for: CPK, CPKX, CPX  Lab Results   Component Value Date/Time    Sodium 141 06/17/2021 10:20 AM    Potassium 4.3 06/17/2021 10:20 AM    Chloride 109 (H) 06/17/2021 10:20 AM    CO2 26 06/17/2021 10:20 AM    Anion gap 6 06/17/2021 10:20 AM    Glucose 91 06/17/2021 10:20 AM    BUN 14 06/17/2021 10:20 AM    Creatinine 1.34 (H) 06/17/2021 10:20 AM    BUN/Creatinine ratio 10 (L) 06/17/2021 10:20 AM    GFR est AA >60 06/17/2021 10:20 AM    GFR est non-AA 52 (L) 06/17/2021 10:20 AM    Calcium 9.4 06/17/2021 10:20 AM    Bilirubin, total 1.5 (H) 11/04/2020 10:21 AM    Alk. phosphatase 50 11/04/2020 10:21 AM    Protein, total 6.2 11/04/2020 10:21 AM    Albumin 4.7 11/04/2020 10:21 AM    Globulin 2.3 07/29/2010 02:23 PM    A-G Ratio 3.1 (H) 11/04/2020 10:21 AM    ALT (SGPT) 27 11/04/2020 10:21 AM       EKG:  AV Paced     Assessment:      1. Coronary artery disease involving native coronary artery of native heart without angina pectoris    2. Systolic CHF, chronic ([address]    3. Biventricular ICD (implantable cardioverter-defibrillator) in place    4. Essential hypertension, benign    5. Mixed hyperlipidemia    6. PAF (paroxysmal atrial fibrillation) (HCC)        No orders of the defined types were placed in this encounter. Plan:     Coronary artery disease involving native coronary artery of native heart without angina pectoris  Hx SUREKHA to the dRCA and mLAD in 8/2018.   Denies angina or anginal equivalent symptoms. Continue medical management with Coreg 25mg bid, ASA and Crestor 98EX      Systolic CHF, chronic; BiV-ICD upgrade 5/17       Echo 5/2021 EF 25-30%, mild MR. Per echo 12/1/2020 EF decreased to 25-30%. 5/2017 EF 25-30%. Echo  9/2017 EF 50-55% with grade 1 DD with mild MR. Stable. Continue Carvedilol 25 mg BID, Entresto 24/26 mg BID (started 11/10/2021)  Continue Lasix  Repeat echo in 3 mos      Paroxysmal atrial fibrillation   Had recurrent [facility]ib episode 10/2020 with [facility] 11/12/2020 started on Amiodarone. Device followed by Dr Christiano Bell, per device interrogation today no recurrence  continue with Eliquis therapy and Coreg and Amiodarone     Mixed hyperlipidemia  6/17/21 LDL 62. On statin and Zetia Labs and lipids per PCP       Essential hypertension, benign  BP controlled.   Continue anti-hypertensive therapy and low sodium diet        Continue current care and f/u as scheduled

## 2021-12-22 ENCOUNTER — OFFICE VISIT (OUTPATIENT)
Dept: CARDIOLOGY CLINIC | Age: 71
End: 2021-12-22
Payer: MEDICARE

## 2021-12-22 VITALS
HEART RATE: 85 BPM | SYSTOLIC BLOOD PRESSURE: 130 MMHG | HEIGHT: 68 IN | WEIGHT: 240.4 LBS | OXYGEN SATURATION: 96 % | BODY MASS INDEX: 36.43 KG/M2 | RESPIRATION RATE: 18 BRPM | DIASTOLIC BLOOD PRESSURE: 78 MMHG

## 2021-12-22 DIAGNOSIS — I10 ESSENTIAL HYPERTENSION, BENIGN: ICD-10-CM

## 2021-12-22 DIAGNOSIS — I48.0 PAF (PAROXYSMAL ATRIAL FIBRILLATION) (HCC): ICD-10-CM

## 2021-12-22 DIAGNOSIS — Z95.810 BIVENTRICULAR ICD (IMPLANTABLE CARDIOVERTER-DEFIBRILLATOR) IN PLACE: ICD-10-CM

## 2021-12-22 DIAGNOSIS — E78.2 MIXED HYPERLIPIDEMIA: ICD-10-CM

## 2021-12-22 DIAGNOSIS — I25.10 CORONARY ARTERY DISEASE INVOLVING NATIVE CORONARY ARTERY OF NATIVE HEART WITHOUT ANGINA PECTORIS: Primary | ICD-10-CM

## 2021-12-22 DIAGNOSIS — I50.22 SYSTOLIC CHF, CHRONIC (HCC): ICD-10-CM

## 2021-12-22 PROCEDURE — 99214 OFFICE O/P EST MOD 30 MIN: CPT | Performed by: INTERNAL MEDICINE

## 2021-12-22 PROCEDURE — G8754 DIAS BP LESS 90: HCPCS | Performed by: INTERNAL MEDICINE

## 2021-12-22 PROCEDURE — G0463 HOSPITAL OUTPT CLINIC VISIT: HCPCS | Performed by: INTERNAL MEDICINE

## 2021-12-22 PROCEDURE — G8432 DEP SCR NOT DOC, RNG: HCPCS | Performed by: INTERNAL MEDICINE

## 2021-12-22 PROCEDURE — G8427 DOCREV CUR MEDS BY ELIG CLIN: HCPCS | Performed by: INTERNAL MEDICINE

## 2021-12-22 PROCEDURE — 93005 ELECTROCARDIOGRAM TRACING: CPT | Performed by: INTERNAL MEDICINE

## 2021-12-22 PROCEDURE — G8536 NO DOC ELDER MAL SCRN: HCPCS | Performed by: INTERNAL MEDICINE

## 2021-12-22 PROCEDURE — 3017F COLORECTAL CA SCREEN DOC REV: CPT | Performed by: INTERNAL MEDICINE

## 2021-12-22 PROCEDURE — G8752 SYS BP LESS 140: HCPCS | Performed by: INTERNAL MEDICINE

## 2021-12-22 PROCEDURE — 1101F PT FALLS ASSESS-DOCD LE1/YR: CPT | Performed by: INTERNAL MEDICINE

## 2021-12-22 PROCEDURE — 93010 ELECTROCARDIOGRAM REPORT: CPT | Performed by: INTERNAL MEDICINE

## 2021-12-22 PROCEDURE — G8417 CALC BMI ABV UP PARAM F/U: HCPCS | Performed by: INTERNAL MEDICINE

## 2021-12-22 RX ORDER — CLOBETASOL PROPIONATE 0.5 MG/G
OINTMENT TOPICAL
COMMUNITY
Start: 2021-09-27

## 2021-12-22 RX ORDER — ERYTHROMYCIN 5 MG/G
OINTMENT OPHTHALMIC
COMMUNITY
Start: 2021-10-26 | End: 2022-04-13

## 2021-12-22 RX ORDER — GENTAMICIN SULFATE 1 MG/G
OINTMENT TOPICAL
COMMUNITY
Start: 2021-11-24 | End: 2022-10-12 | Stop reason: ALTCHOICE

## 2021-12-22 NOTE — PROGRESS NOTES
1. Have you been to the ER, urgent care clinic since your last visit? Hospitalized since your last visit? No    2. Have you seen or consulted any other health care providers outside of the [address] [place] [address] since your last visit? Include any pap smears or colon screening. No     Chief Complaint   Patient presents with    Hypertension     f/u on med changes. Pt says all is working well.  No cardiac concerns

## 2022-01-20 RX ORDER — EZETIMIBE 10 MG/1
TABLET ORAL
Qty: 90 TABLET | Refills: 1 | Status: SHIPPED | OUTPATIENT
Start: 2022-01-20

## 2022-02-03 NOTE — PROGRESS NOTES
Subjective:      Brayan Henderson is a 67 y.o. male is here for EP follow up appt. He denies chest pain, SOB/WING, orthopnea, PND or edema. Denies palpitations or lightheadedness. New on Entresto and feeling better. Patient Active Problem List    Diagnosis Date Noted    [facility]) 08/10/2018    Presence of biventricular AICD 08/10/2018    Abnormal stress ECG 08/10/2018    VT (ventricular tachycardia) ([facility]) 08/10/2018    Chronic diastolic HF (heart failure) ([address] 67/90/7399    Systolic CHF, chronic ([address] 08/10/2018    S/P coronary artery stent placement 08/10/2018    VF (ventricular fibrillation) ([address] 08/10/2018    Severe obesity (BMI 35.0-39. 9) with comorbidity ([address] 04/19/2018    Low back pain 07/02/2013    Automatic implantable cardioverter-defibrillator in situ 06/11/2012    GERD (gastroesophageal reflux disease) 05/14/2012    Hematochezia 05/14/2012    Screen for colon cancer 05/14/2012    CAD (coronary artery disease) 02/16/2011    Mixed hyperlipidemia 02/16/2011    Cardiomyopathy ([address] 02/16/2011    Encounter for long-term (current) use of other medications 02/16/2011    Nocturia 02/16/2011    Abnormal PSA 02/16/2011    Essential hypertension, benign 02/16/2011      Armando Dubois MD  Past Medical History:   Diagnosis Date    [facility]) 8/10/2018    Abnormal PSA 2/16/2011    Abnormal stress ECG 8/10/2018    CAD (coronary artery disease) 2/16/2011    Cancer ([address]     Cardiomyopathy ([address] 2/16/2011    Chronic diastolic HF (heart failure) ([address] 8/10/2018    Encounter for long-term (current) use of other medications 2/16/2011    Esophageal reflux 2/16/2011    Essential hypertension, benign 2/16/2011    GERD (gastroesophageal reflux disease) 5/14/2012    Heart attack ([address]     Long term current use of anticoagulant therapy     Low back pain 7/2/2013    Mixed hyperlipidemia 2/16/2011    Nocturia 2/16/2011    Pacemaker     Presence of biventricular AICD 8/10/2018    [employer] BIVICD implanted 5/2017    S/P coronary artery stent placement 8/10/2018    8/9/18 PCI/SUREKHA to LAD, RCA x 2    Systolic CHF, chronic ([facility] 8/10/2018    VF (ventricular fibrillation) ([facility] 8/10/2018    VT (ventricular tachycardia) ([facility] 8/10/2018      Past Surgical History:   Procedure Laterality Date    HX CERVICAL FUSION  1997    c4,5,6    HX CORONARY STENT PLACEMENT      HX PACEMAKER      IA COLONOSCOPY FLX DX W/COLLJ SPEC WHEN PFRMD  5/14/2012         IA EGD TRANSORAL BIOPSY SINGLE/MULTIPLE  5/14/2012         IA REMOVAL GALLBLADDER       No Known Allergies   Family History   Problem Relation Age of Onset    Lung Disease Mother     Cancer Mother     Alcohol abuse Father     Heart Disease Father     negative for cardiac disease  Social History     Socioeconomic History    Marital status:    Tobacco Use    Smoking status: Never Smoker    Smokeless tobacco: Never Used   Vaping Use    Vaping Use: Never used   Substance and Sexual Activity    Alcohol use: No    Drug use: No     Current Outpatient Medications   Medication Sig    ezetimibe (ZETIA) 10 mg tablet TAKE 1 TABLET EVERY DAY TO LOWER CHOLESTEROL    amiodarone (CORDARONE) 200 mg tablet TAKE 1 TABLET EVERY DAY FOR HEART RYTHYM (Patient taking differently: Take 100 mg by mouth daily.)    clobetasoL (TEMOVATE) 0.05 % ointment     erythromycin (ILOTYCIN) ophthalmic ointment As needed    gentamicin (GARAMYCIN) 0.1 % topical ointment     rosuvastatin (CRESTOR) 20 mg tablet TAKE 1 TABLET BY MOUTH DAILY FOR CHOLESTEROL BEST IN THE EVENING    sacubitriL-valsartan (Entresto) 24-26 mg tablet Take 1 Tablet by mouth two (2) times a day.  furosemide (LASIX) 20 mg tablet TAKE 1 TABLET BY MOUTH EVERY MORNING FOR FLUID    tamsulosin (FLOMAX) 0.4 mg capsule TAKE 1 CAPSULE EVERY DAY    carvediloL (COREG) 25 mg tablet TAKE 1 TABLET BY MOUTH TWO (2) TIMES DAILY (WITH MEALS).     Eliquis 5 mg tablet TAKE 1 TABLET BY MOUTH TWO (2) TIMES A DAY. FOR BLOOD THINNER    aspirin delayed-release 81 mg tablet Take  by mouth daily.  nitroglycerin (NITROLINGUAL) 400 mcg/spray spray 1 Spray by SubLINGual route every five (5) minutes as needed.  cyanocobalamin 1,000 mcg tablet Take 1,000 mcg by mouth daily.  omega-3-dha-epa-dpa-fish oil 1,050-1,200 mg cap Take 1 Cap by mouth two (2) times a day.  cholecalciferol, vitamin d3, (VITAMIN D) 1,000 unit tablet Take  by mouth daily.  multivitamins-minerals-lutein (CENTRUM SILVER) Tab Take 1 Tab by mouth daily.  cyclobenzaprine (FLEXERIL) 10 mg tablet Take 1 Tab by mouth three (3) times daily as needed for Muscle Spasm(s). (Patient not taking: Reported on 2/10/2022)     No current facility-administered medications for this visit. Vitals:    02/10/22 0937   BP: 100/68   Pulse: 71   Resp: 18   SpO2: 96%   Weight: 241 lb (109.3 kg)   Height: 5' 8\" (1.727 m)       I have reviewed the nurses notes, vitals, problem list, allergy list, medical history, family, social history and medications. Review of Symptoms:    General: Pt denies excessive weight gain or loss. Pt is able to conduct ADL's  HEENT: Denies blurred vision, headaches, hearing loss, epistaxis and difficulty swallowing. Respiratory: +drew, Denies cough, congestion, shortness of breath, wheezing or stridor. Cardiovascular: Denies precordial pain, palpitations, edema or PND  Gastrointestinal: Denies poor appetite, indigestion, abdominal pain or blood in stool  Genitourinary: Denies hematuria, dysuria, increased urinary frequency  Musculoskeletal: Denies joint pain or swelling from muscles or joints  Neurologic: Denies tremor, paresthesias, headache, or sensory motor disturbance  Psychiatric: Denies confusion, insomnia, depression  Integumentray: Denies rash, itching or ulcers. Hematologic: Denies easy bruising, bleeding    Physical Exam:      General: Well developed, in no acute distress.   HEENT: Eyes - PERRL, no jvd  Heart:  Normal S1/S2 negative S3 or S4. Regular, no murmur, gallop or rub. Respiratory: Clear bilaterally x 4, no wheezing or rales  Abdomen:   Soft, non-tender, bowel sounds are active. Extremities:  No edema, normal cap refill, no cyanosis. Musculoskeletal: No clubbing  Neuro: A&Ox3, speech clear, gait stable. Skin: Skin color is normal. No rashes or lesions. Non diaphoretic, no ulcers or subcutaneous nodule  Vascular: 2+ pulses symmetric in all extremities  Psych - judgement intact and orientation is wnl     Cardiographics    Ekg: AV paced        Results for orders placed or performed during the hospital encounter of 11/12/20   EKG, 12 LEAD, INITIAL   Result Value Ref Range    Ventricular Rate 70 BPM    Atrial Rate 70 BPM    P-R Interval 196 ms    QRS Duration 174 ms    Q-T Interval 490 ms    QTC Calculation (Bezet) 529 ms    Calculated P Axis 6 degrees    Calculated R Axis -117 degrees    Calculated T Axis 52 degrees    Diagnosis       AV dual-paced rhythm  Biventricular pacemaker detected  When compared with ECG of 06-MAY-2019 05:29,  Vent. rate has increased BY   6 BPM  Confirmed by Rhianna Ziegler, P.V. ([id number]) on 11/12/2020 11:45:20 PM           Lab Results   Component Value Date/Time    WBC 6.4 06/17/2021 10:20 AM    HGB 14.5 06/17/2021 10:20 AM    HCT 44.5 06/17/2021 10:20 AM    PLATELET 229 75/24/6776 10:20 AM    .0 (H) 06/17/2021 10:20 AM      Lab Results   Component Value Date/Time    Sodium 141 06/17/2021 10:20 AM    Potassium 4.3 06/17/2021 10:20 AM    Chloride 109 (H) 06/17/2021 10:20 AM    CO2 26 06/17/2021 10:20 AM    Anion gap 6 06/17/2021 10:20 AM    Glucose 91 06/17/2021 10:20 AM    BUN 14 06/17/2021 10:20 AM    Creatinine 1.34 (H) 06/17/2021 10:20 AM    BUN/Creatinine ratio 10 (L) 06/17/2021 10:20 AM    GFR est AA >60 06/17/2021 10:20 AM    GFR est non-AA 52 (L) 06/17/2021 10:20 AM    Calcium 9.4 06/17/2021 10:20 AM    Bilirubin, total 1.5 (H) 11/04/2020 10:21 AM    Alk.  phosphatase 50 11/04/2020 10:21 AM    Protein, total 6.2 11/04/2020 10:21 AM    Albumin 4.7 11/04/2020 10:21 AM    Globulin 2.3 07/29/2010 02:23 PM    A-G Ratio 3.1 (H) 11/04/2020 10:21 AM    ALT (SGPT) 27 11/04/2020 10:21 AM         Assessment:        ICD-10-CM ICD-9-CM    1. Systolic CHF, chronic (HCC)  I50.22 428.22 AMB POC EKG ROUTINE W/ 12 LEADS, INTER & REP     428.0    2. Biventricular ICD (implantable cardioverter-defibrillator) in place  Z95.810 V45.02    3. Coronary artery disease involving native coronary artery of native heart without angina pectoris  I25.10 414.01    4. Essential hypertension, benign  I10 401.1    5. Mixed hyperlipidemia  E78.2 272.2    6. PAF (paroxysmal atrial fibrillation) ([facility]  I48.0 427.31      Orders Placed This Encounter    AMB POC EKG ROUTINE W/ 12 LEADS, INTER & REP     Order Specific Question:   Reason for Exam:     Answer:   ROUTINE        Plan:   Chipper Severs is here for an EP follow up appt. Denies adverse cardiac complaints. Per device interrogation it is functioning appropriately. A paced 100% for sick sinus, 88% RVP 98% LVP for cardiomyopathy. No further AF since dccv and being started on amio. ECG AV paced. BP trending low normotensive. EF 25-30% per echo 5/18/21. Plans for repeat echo 4/22. Continue medical management for cardiomyopathy, HTN, hyperlipidemia, CAD, and AF. General cardiology care with Dr Harmon Officer. Thank you for allowing me to participate in Chipper Severs 's care.     Keven Lyman, TRINA

## 2022-02-10 ENCOUNTER — OFFICE VISIT (OUTPATIENT)
Dept: CARDIOLOGY CLINIC | Age: 72
End: 2022-02-10
Payer: MEDICARE

## 2022-02-10 VITALS
DIASTOLIC BLOOD PRESSURE: 68 MMHG | SYSTOLIC BLOOD PRESSURE: 100 MMHG | WEIGHT: 241 LBS | OXYGEN SATURATION: 96 % | RESPIRATION RATE: 18 BRPM | HEART RATE: 71 BPM | HEIGHT: 68 IN | BODY MASS INDEX: 36.53 KG/M2

## 2022-02-10 DIAGNOSIS — I47.20 VT (VENTRICULAR TACHYCARDIA): ICD-10-CM

## 2022-02-10 DIAGNOSIS — I50.22 SYSTOLIC CHF, CHRONIC (HCC): Primary | ICD-10-CM

## 2022-02-10 DIAGNOSIS — I48.0 PAF (PAROXYSMAL ATRIAL FIBRILLATION) (HCC): ICD-10-CM

## 2022-02-10 DIAGNOSIS — I10 ESSENTIAL HYPERTENSION, BENIGN: ICD-10-CM

## 2022-02-10 DIAGNOSIS — Z95.810 BIVENTRICULAR ICD (IMPLANTABLE CARDIOVERTER-DEFIBRILLATOR) IN PLACE: Primary | ICD-10-CM

## 2022-02-10 DIAGNOSIS — Z95.810 BIVENTRICULAR ICD (IMPLANTABLE CARDIOVERTER-DEFIBRILLATOR) IN PLACE: ICD-10-CM

## 2022-02-10 DIAGNOSIS — I25.10 CORONARY ARTERY DISEASE INVOLVING NATIVE CORONARY ARTERY OF NATIVE HEART WITHOUT ANGINA PECTORIS: ICD-10-CM

## 2022-02-10 DIAGNOSIS — E78.2 MIXED HYPERLIPIDEMIA: ICD-10-CM

## 2022-02-10 DIAGNOSIS — I42.9 CARDIOMYOPATHY, UNSPECIFIED TYPE (HCC): ICD-10-CM

## 2022-02-10 PROCEDURE — G0463 HOSPITAL OUTPT CLINIC VISIT: HCPCS | Performed by: NURSE PRACTITIONER

## 2022-02-10 PROCEDURE — 99214 OFFICE O/P EST MOD 30 MIN: CPT | Performed by: NURSE PRACTITIONER

## 2022-02-10 PROCEDURE — G8752 SYS BP LESS 140: HCPCS | Performed by: NURSE PRACTITIONER

## 2022-02-10 PROCEDURE — G8754 DIAS BP LESS 90: HCPCS | Performed by: NURSE PRACTITIONER

## 2022-02-10 PROCEDURE — G8432 DEP SCR NOT DOC, RNG: HCPCS | Performed by: NURSE PRACTITIONER

## 2022-02-10 PROCEDURE — 93010 ELECTROCARDIOGRAM REPORT: CPT | Performed by: NURSE PRACTITIONER

## 2022-02-10 PROCEDURE — G8417 CALC BMI ABV UP PARAM F/U: HCPCS | Performed by: NURSE PRACTITIONER

## 2022-02-10 PROCEDURE — 93005 ELECTROCARDIOGRAM TRACING: CPT | Performed by: NURSE PRACTITIONER

## 2022-02-10 PROCEDURE — 3017F COLORECTAL CA SCREEN DOC REV: CPT | Performed by: NURSE PRACTITIONER

## 2022-02-10 PROCEDURE — 1101F PT FALLS ASSESS-DOCD LE1/YR: CPT | Performed by: NURSE PRACTITIONER

## 2022-02-10 PROCEDURE — G8536 NO DOC ELDER MAL SCRN: HCPCS | Performed by: NURSE PRACTITIONER

## 2022-02-10 PROCEDURE — G8427 DOCREV CUR MEDS BY ELIG CLIN: HCPCS | Performed by: NURSE PRACTITIONER

## 2022-02-10 PROCEDURE — 93284 PRGRMG EVAL IMPLANTABLE DFB: CPT | Performed by: INTERNAL MEDICINE

## 2022-02-10 NOTE — LETTER
2/10/2022    Patient: Matthieu Pandey   YOB: 1950   Date of Visit: 2/10/2022     Chata Quintana, [address] [facility]  [address] [facility] [address]    Dear Chata Quintana MD,      Thank you for referring Mr. Sally Gaston to [address] [place] [address] for evaluation. My notes for this consultation are attached. If you have questions, please do not hesitate to call me. I look forward to following your patient along with you.       Sincerely,    Xavier Singh NP

## 2022-02-10 NOTE — PROGRESS NOTES
1. Have you been to the ER, urgent care clinic since your last visit? Hospitalized since your last visit? No    2. Have you seen or consulted any other health care providers outside of the [address] [place] [address] since your last visit? Include any pap smears or colon screening.  No         Chief Complaint   Patient presents with    Follow-up     Pt denies cardiac symptoms       Pt states that he has had both covid vaccines and booster

## 2022-02-20 RX ORDER — TAMSULOSIN HYDROCHLORIDE 0.4 MG/1
CAPSULE ORAL
Qty: 90 CAPSULE | Refills: 0 | Status: SHIPPED | OUTPATIENT
Start: 2022-02-20 | End: 2022-05-13

## 2022-03-18 PROBLEM — E66.01 SEVERE OBESITY (BMI 35.0-39.9) WITH COMORBIDITY (HCC): Status: ACTIVE | Noted: 2018-04-19

## 2022-03-19 PROBLEM — I50.22 SYSTOLIC CHF, CHRONIC (HCC): Status: ACTIVE | Noted: 2018-08-10

## 2022-03-19 PROBLEM — I49.01 VF (VENTRICULAR FIBRILLATION) (HCC): Status: ACTIVE | Noted: 2018-08-10

## 2022-03-19 PROBLEM — Z95.810 PRESENCE OF BIVENTRICULAR AICD: Status: ACTIVE | Noted: 2018-08-10

## 2022-03-19 PROBLEM — I50.32 CHRONIC DIASTOLIC HF (HEART FAILURE) (HCC): Status: ACTIVE | Noted: 2018-08-10

## 2022-03-19 PROBLEM — I47.20 VT (VENTRICULAR TACHYCARDIA) (HCC): Status: ACTIVE | Noted: 2018-08-10

## 2022-03-19 PROBLEM — I48.91 A-FIB (HCC): Status: ACTIVE | Noted: 2018-08-10

## 2022-03-20 PROBLEM — Z95.5 S/P CORONARY ARTERY STENT PLACEMENT: Status: ACTIVE | Noted: 2018-08-10

## 2022-03-20 PROBLEM — R94.39 ABNORMAL STRESS ECG: Status: ACTIVE | Noted: 2018-08-10

## 2022-04-13 ENCOUNTER — ANCILLARY PROCEDURE (OUTPATIENT)
Dept: CARDIOLOGY CLINIC | Age: 72
End: 2022-04-13
Payer: MEDICARE

## 2022-04-13 ENCOUNTER — OFFICE VISIT (OUTPATIENT)
Dept: CARDIOLOGY CLINIC | Age: 72
End: 2022-04-13
Payer: MEDICARE

## 2022-04-13 VITALS
HEIGHT: 68 IN | SYSTOLIC BLOOD PRESSURE: 110 MMHG | WEIGHT: 243.7 LBS | HEART RATE: 71 BPM | RESPIRATION RATE: 18 BRPM | DIASTOLIC BLOOD PRESSURE: 80 MMHG | OXYGEN SATURATION: 97 % | BODY MASS INDEX: 36.93 KG/M2

## 2022-04-13 VITALS
HEIGHT: 68 IN | SYSTOLIC BLOOD PRESSURE: 100 MMHG | BODY MASS INDEX: 36.53 KG/M2 | DIASTOLIC BLOOD PRESSURE: 68 MMHG | WEIGHT: 241 LBS

## 2022-04-13 DIAGNOSIS — I50.22 SYSTOLIC CHF, CHRONIC (HCC): ICD-10-CM

## 2022-04-13 DIAGNOSIS — I48.0 PAF (PAROXYSMAL ATRIAL FIBRILLATION) (HCC): ICD-10-CM

## 2022-04-13 DIAGNOSIS — I25.10 CORONARY ARTERY DISEASE INVOLVING NATIVE CORONARY ARTERY OF NATIVE HEART WITHOUT ANGINA PECTORIS: ICD-10-CM

## 2022-04-13 DIAGNOSIS — I25.10 CORONARY ARTERY DISEASE INVOLVING NATIVE CORONARY ARTERY OF NATIVE HEART WITHOUT ANGINA PECTORIS: Primary | ICD-10-CM

## 2022-04-13 DIAGNOSIS — Z95.810 BIVENTRICULAR ICD (IMPLANTABLE CARDIOVERTER-DEFIBRILLATOR) IN PLACE: ICD-10-CM

## 2022-04-13 DIAGNOSIS — E78.2 MIXED HYPERLIPIDEMIA: ICD-10-CM

## 2022-04-13 DIAGNOSIS — I10 ESSENTIAL HYPERTENSION, BENIGN: ICD-10-CM

## 2022-04-13 DIAGNOSIS — I42.0 DILATED CARDIOMYOPATHY (HCC): ICD-10-CM

## 2022-04-13 PROCEDURE — G0463 HOSPITAL OUTPT CLINIC VISIT: HCPCS | Performed by: INTERNAL MEDICINE

## 2022-04-13 PROCEDURE — 93005 ELECTROCARDIOGRAM TRACING: CPT | Performed by: INTERNAL MEDICINE

## 2022-04-13 PROCEDURE — 93306 TTE W/DOPPLER COMPLETE: CPT | Performed by: INTERNAL MEDICINE

## 2022-04-13 PROCEDURE — G8417 CALC BMI ABV UP PARAM F/U: HCPCS | Performed by: INTERNAL MEDICINE

## 2022-04-13 PROCEDURE — G8432 DEP SCR NOT DOC, RNG: HCPCS | Performed by: INTERNAL MEDICINE

## 2022-04-13 PROCEDURE — 3017F COLORECTAL CA SCREEN DOC REV: CPT | Performed by: INTERNAL MEDICINE

## 2022-04-13 PROCEDURE — 1101F PT FALLS ASSESS-DOCD LE1/YR: CPT | Performed by: INTERNAL MEDICINE

## 2022-04-13 PROCEDURE — 99214 OFFICE O/P EST MOD 30 MIN: CPT | Performed by: INTERNAL MEDICINE

## 2022-04-13 PROCEDURE — 93010 ELECTROCARDIOGRAM REPORT: CPT | Performed by: INTERNAL MEDICINE

## 2022-04-13 PROCEDURE — G8752 SYS BP LESS 140: HCPCS | Performed by: INTERNAL MEDICINE

## 2022-04-13 PROCEDURE — G8427 DOCREV CUR MEDS BY ELIG CLIN: HCPCS | Performed by: INTERNAL MEDICINE

## 2022-04-13 PROCEDURE — G8754 DIAS BP LESS 90: HCPCS | Performed by: INTERNAL MEDICINE

## 2022-04-13 PROCEDURE — G8536 NO DOC ELDER MAL SCRN: HCPCS | Performed by: INTERNAL MEDICINE

## 2022-04-13 RX ORDER — FLUCONAZOLE 100 MG/1
100 TABLET ORAL
COMMUNITY
Start: 2022-04-06 | End: 2022-07-12 | Stop reason: ALTCHOICE

## 2022-04-13 RX ADMIN — PERFLUTREN 2 ML: 6.52 INJECTION, SUSPENSION INTRAVENOUS at 10:42

## 2022-04-13 NOTE — PROGRESS NOTES
Jonathan Lewis, [facility]    Subjective/HPI:     Aly Woody is a 67 y.o. male is here for routine f/u. He has a PMHx of CAD, ischemic cardiomyopathy s/p BiV ICD, PAF, HTN and HLD. Feeling well today. Had echo done to reassess EF. Denies complaints of chest pains, dizziness, orthopnea, PND or edema. Denies palpitation symptoms or shortness of breath. Current Outpatient Medications on File Prior to Visit   Medication Sig Dispense Refill    fluconazole (DIFLUCAN) 100 mg tablet Take 100 mg by mouth. Once a week      tamsulosin (FLOMAX) 0.4 mg capsule TAKE 1 CAPSULE EVERY DAY 90 Capsule 0    ezetimibe (ZETIA) 10 mg tablet TAKE 1 TABLET EVERY DAY TO LOWER CHOLESTEROL 90 Tablet 1    amiodarone (CORDARONE) 200 mg tablet TAKE 1 TABLET EVERY DAY FOR HEART RYTHYM (Patient taking differently: Take 100 mg by mouth daily.) 90 Tablet 1    clobetasoL (TEMOVATE) 0.05 % ointment       gentamicin (GARAMYCIN) 0.1 % topical ointment       rosuvastatin (CRESTOR) 20 mg tablet TAKE 1 TABLET BY MOUTH DAILY FOR CHOLESTEROL BEST IN THE EVENING 90 Tablet 2    sacubitriL-valsartan (Entresto) 24-26 mg tablet Take 1 Tablet by mouth two (2) times a day. 60 Tablet 5    furosemide (LASIX) 20 mg tablet TAKE 1 TABLET BY MOUTH EVERY MORNING FOR FLUID 90 Tablet 3    carvediloL (COREG) 25 mg tablet TAKE 1 TABLET BY MOUTH TWO (2) TIMES DAILY (WITH MEALS). 180 Tablet 3    Eliquis 5 mg tablet TAKE 1 TABLET BY MOUTH TWO (2) TIMES A DAY. FOR BLOOD THINNER 180 Tab 3    aspirin delayed-release 81 mg tablet Take  by mouth daily.  nitroglycerin (NITROLINGUAL) 400 mcg/spray spray 1 Spray by SubLINGual route every five (5) minutes as needed. 1 Bottle 1    cyanocobalamin 1,000 mcg tablet Take 1,000 mcg by mouth daily.  omega-3-dha-epa-dpa-fish oil 1,050-1,200 mg cap Take 1 Cap by mouth two (2) times a day.  cholecalciferol, vitamin d3, (VITAMIN D) 1,000 unit tablet Take  by mouth daily.       multivitamins-minerals-lutein (CENTRUM SILVER) Tab Take 1 Tab by mouth daily.  erythromycin (ILOTYCIN) ophthalmic ointment As needed (Patient not taking: Reported on 4/13/2022)      cyclobenzaprine (FLEXERIL) 10 mg tablet Take 1 Tab by mouth three (3) times daily as needed for Muscle Spasm(s). (Patient not taking: Reported on 2/10/2022) 30 Tab 3     No current facility-administered medications on file prior to visit. Review of Symptoms:    Review of Systems   Constitutional: Negative for chills, fever and weight loss. HENT: Negative for nosebleeds. Eyes: Negative for blurred vision and double vision. Respiratory: Negative for cough, shortness of breath and wheezing. Cardiovascular: Negative for chest pain, palpitations, orthopnea, leg swelling and PND. Skin: Negative for rash. Neurological: Negative for dizziness and loss of consciousness. Physical Exam:      General: Well developed, in no acute distress, cooperative and alert  Heart:  reg rate and rhythm; normal S1/S2; no murmurs, no gallops or rubs. Respiratory: Clear bilaterally x 4, no wheezing or rales  Extremities:  Normal cap refill, no cyanosis, atraumatic. No edema. Vascular: 2+ pulses symmetric in all extremities    Vitals:    04/13/22 1012   BP: 110/80   BP 1 Location: Left upper arm   BP Patient Position: Sitting   BP Cuff Size: Large adult   Pulse: 71   Resp: 18   Height: 5' 8\" (1.727 m)   Weight: 243 lb 11.2 oz (110.5 kg)   SpO2: 97%       ECG done today shows AV paced rhythm     Assessment:       ICD-10-CM ICD-9-CM    1. Coronary artery disease involving native coronary artery of native heart without angina pectoris  I25.10 414.01    2. Dilated cardiomyopathy ([address]  E13.3 990.4 METABOLIC PANEL, BASIC      METABOLIC PANEL, BASIC   3. PAF (paroxysmal atrial fibrillation) ([facility])  I48.0 427.31    4. Mixed hyperlipidemia  E78.2 272.2    5.  Essential hypertension, benign  I10 401.1 AMB POC EKG ROUTINE W/ 12 LEADS, INTER & REP   6. Biventricular ICD (implantable cardioverter-defibrillator) in place  Z95.810 V45.02         Plan:     1. Coronary artery disease involving native coronary artery of native heart without angina pectoris  Hx of SUREKHA to the dRCA and mLAD in 8/2018. Without anginal or anginal equivalent symptoms  Continue BB, ASA and statin therapy    2. Dilated cardiomyopathy  Hx of non-ischemic cardiomyopathy previously resolved, now recurred  Echo done today with reduced LVEF 25-30% -- prelim findings discussed, will call back once final report is read  Euvolemic on exam today  Continue carvedilol, Entresto. Add Jardiance 10 mg daily, repeat labs in 2 weeks.     3. Paroxysmal atrial fibrillation (HCC)  Maintaining sinus rhythm on recent device check  Continue amiodarone, carvedilol  On Eliquis for stroke prevention     4. Mixed hyperlipidemia  LDL 62 in 6/2021  Continue statin therapy and low fat, low cholesterol diet  Lipids managed by PCP     5. Essential hypertension, benign  BP controlled. Continue anti-hypertensive therapy and low sodium diet     6.   Presence of automatic cardioverter/defibrillator  With BiV ICD implant  Continue with routine device interrogation with Dr. Ravinder Macias    F/u with Dr. Alvarez Mann in 6 months    Carlito Rodriguez NP

## 2022-04-13 NOTE — PROGRESS NOTES
1. Have you been to the ER, urgent care clinic since your last visit? Hospitalized since your last visit? No    2. Have you seen or consulted any other health care providers outside of the [address] [place] [address] since your last visit? Include any pap smears or colon screening. No         Chief Complaint   Patient presents with    Coronary Artery Disease     4 mo appt.   C/O  Leg swelling

## 2022-04-14 LAB
ECHO AO ASC DIAM: 3.5 CM
ECHO AO ASCENDING AORTA INDEX: 1.58 CM/M2
ECHO AO ROOT DIAM: 3.3 CM
ECHO AO ROOT INDEX: 1.49 CM/M2
ECHO AV AREA PEAK VELOCITY: 2.2 CM2
ECHO AV AREA/BSA PEAK VELOCITY: 1 CM2/M2
ECHO AV PEAK GRADIENT: 8 MMHG
ECHO AV PEAK VELOCITY: 1.5 M/S
ECHO LA DIAMETER INDEX: 1.67 CM/M2
ECHO LA DIAMETER: 3.7 CM
ECHO LA TO AORTIC ROOT RATIO: 1.12
ECHO LA VOL 2C: 130 ML (ref 18–58)
ECHO LA VOL 4C: 72 ML (ref 18–58)
ECHO LA VOL BP: 97 ML (ref 18–58)
ECHO LA VOL/BSA BIPLANE: 44 ML/M2 (ref 16–34)
ECHO LA VOLUME AREA LENGTH: 101 ML
ECHO LA VOLUME INDEX A2C: 59 ML/M2 (ref 16–34)
ECHO LA VOLUME INDEX A4C: 32 ML/M2 (ref 16–34)
ECHO LA VOLUME INDEX AREA LENGTH: 45 ML/M2 (ref 16–34)
ECHO LV E' LATERAL VELOCITY: 12 CM/S
ECHO LV E' SEPTAL VELOCITY: 9 CM/S
ECHO LV FRACTIONAL SHORTENING: 20 % (ref 28–44)
ECHO LV INTERNAL DIMENSION DIASTOLE INDEX: 2.75 CM/M2
ECHO LV INTERNAL DIMENSION DIASTOLIC: 6.1 CM (ref 4.2–5.9)
ECHO LV INTERNAL DIMENSION SYSTOLIC INDEX: 2.21 CM/M2
ECHO LV INTERNAL DIMENSION SYSTOLIC: 4.9 CM
ECHO LV IVSD: 1.1 CM (ref 0.6–1)
ECHO LV MASS 2D: 304.9 G (ref 88–224)
ECHO LV MASS INDEX 2D: 137.3 G/M2 (ref 49–115)
ECHO LV POSTERIOR WALL DIASTOLIC: 1.2 CM (ref 0.6–1)
ECHO LV RELATIVE WALL THICKNESS RATIO: 0.39
ECHO LVOT AREA: 3.1 CM2
ECHO LVOT DIAM: 2 CM
ECHO LVOT MEAN GRADIENT: 2 MMHG
ECHO LVOT PEAK GRADIENT: 4 MMHG
ECHO LVOT PEAK GRADIENT: 4 MMHG
ECHO LVOT PEAK VELOCITY: 1 M/S
ECHO LVOT PEAK VELOCITY: 1 M/S
ECHO LVOT STROKE VOLUME INDEX: 32.4 ML/M2
ECHO LVOT SV: 71.9 ML
ECHO LVOT VTI: 22.9 CM
ECHO MV A VELOCITY: 1.09 M/S
ECHO MV E DECELERATION TIME (DT): 151 MS
ECHO MV E VELOCITY: 0.85 M/S
ECHO MV E/A RATIO: 0.78
ECHO MV E/E' LATERAL: 7.08
ECHO MV E/E' RATIO (AVERAGED): 8.26
ECHO MV E/E' SEPTAL: 9.44
ECHO MV REGURGITANT PEAK GRADIENT: 92 MMHG
ECHO MV REGURGITANT PEAK VELOCITY: 4.8 M/S
ECHO RV TAPSE: 2.9 CM (ref 1.7–?)

## 2022-04-14 PROCEDURE — 93306 TTE W/DOPPLER COMPLETE: CPT | Performed by: INTERNAL MEDICINE

## 2022-04-18 ENCOUNTER — TELEPHONE (OUTPATIENT)
Dept: CARDIOLOGY CLINIC | Age: 72
End: 2022-04-18

## 2022-04-18 NOTE — PROGRESS NOTES
Please call patient and inform that the echo still shows EF is 20-25% -- no change from our discussion during last OV on 4/13. Keep same med adjustments and fu with Dr. Dorothy Robison as scheduled.

## 2022-04-19 NOTE — TELEPHONE ENCOUNTER
----- Message from Garfield Ch NP sent at 4/18/2022 11:18 AM EDT -----  Please call patient and inform that the echo still shows EF is 20-25% -- no change from our discussion during last OV on 4/13. Keep same med adjustments and fu with Dr. Marilyn Barney as scheduled.
JOSEVM to call me.    (see echo results, message below)
Pt returned call.     Thanks  [employer]
Spoke with patient. Verified patient with two patient identifiers. Discussed echo results in depth with pt as noted below. EF stable at 20-25%. Keep same med adjustments and FU with Dr. Jami Alarcon as instructed. Patient verbalized understanding. Message from Jennifer Crowder NP sent at 4/18/2022 11:18 AM EDT -----  Please call patient and inform that the echo still shows EF is 20-25% -- no change from our discussion during last OV on 4/13. Keep same med adjustments and fu with Dr. Jami Alarcon as scheduled.
[Negative] : Heme/Lymph

## 2022-04-25 RX ORDER — SACUBITRIL AND VALSARTAN 24; 26 MG/1; MG/1
1 TABLET, FILM COATED ORAL 2 TIMES DAILY
Qty: 60 TABLET | Refills: 11 | Status: SHIPPED | OUTPATIENT
Start: 2022-04-25

## 2022-04-26 ENCOUNTER — TELEPHONE (OUTPATIENT)
Dept: CARDIOLOGY CLINIC | Age: 72
End: 2022-04-26

## 2022-04-26 LAB
BUN SERPL-MCNC: 16 MG/DL (ref 8–27)
BUN/CREAT SERPL: 10 (ref 10–24)
CALCIUM SERPL-MCNC: 9.2 MG/DL (ref 8.6–10.2)
CHLORIDE SERPL-SCNC: 108 MMOL/L (ref 96–106)
CO2 SERPL-SCNC: 20 MMOL/L (ref 20–29)
CREAT SERPL-MCNC: 1.65 MG/DL (ref 0.76–1.27)
EGFR: 44 ML/MIN/1.73
GLUCOSE SERPL-MCNC: 118 MG/DL (ref 65–99)
INTERPRETATION: NORMAL
POTASSIUM SERPL-SCNC: 4.2 MMOL/L (ref 3.5–5.2)
SODIUM SERPL-SCNC: 143 MMOL/L (ref 134–144)

## 2022-04-26 NOTE — PROGRESS NOTES
Please call patient and inform that kidney function is a little strained compared to last check, but that was a year ago. For now, I would like for him to reduce lasix to PRN -- does not need to take this daily. Can take 1 tablet if needed for weight gain > 3 lbs/ day or > 6 lbs/week. He should continue Jardiance. We should repeat labs in 1 month and check on the kidney function again.     Thanks,  Teagan Rodriguez

## 2022-04-26 NOTE — TELEPHONE ENCOUNTER
Spoke with patient. Verified with two patient identifiers. Advised pt per Taylor Stoddardt NP,  kidney function is a little strained compared to last check, but that was a year ago. For now, I would like for him to reduce lasix to PRN -- does not need to take this daily. Can take 1 tablet if needed for weight gain > 3 lbs/ day or > 6 lbs/week. He should continue Jardiance. We should repeat labs in 1 month and check on the kidney function again. Pt uses [employer].I Verified pts address will mail out lab slips to pt. Patient verbalized understanding.

## 2022-04-26 NOTE — TELEPHONE ENCOUNTER
----- Message from Maile Spivey NP sent at 4/26/2022 10:58 AM EDT -----  Please call patient and inform that kidney function is a little strained compared to last check, but that was a year ago. For now, I would like for him to reduce lasix to PRN -- does not need to take this daily. Can take 1 tablet if needed for weight gain > 3 lbs/ day or > 6 lbs/week. He should continue Jardiance. We should repeat labs in 1 month and check on the kidney function again.     Thanks,  [employer]

## 2022-05-13 RX ORDER — TAMSULOSIN HYDROCHLORIDE 0.4 MG/1
CAPSULE ORAL
Qty: 90 CAPSULE | Refills: 0 | Status: SHIPPED | OUTPATIENT
Start: 2022-05-13 | End: 2022-08-10

## 2022-05-31 ENCOUNTER — NURSE TRIAGE (OUTPATIENT)
Dept: OTHER | Facility: CLINIC | Age: 72
End: 2022-05-31

## 2022-05-31 PROBLEM — N18.30 CHRONIC RENAL DISEASE, STAGE III (HCC): Status: ACTIVE | Noted: 2022-05-31

## 2022-05-31 NOTE — TELEPHONE ENCOUNTER
Limited triage due to patient not being during call. Received call from [address] at [facility] with Red Flag Complaint. Subjective: Caller states \"Eye problem\"     Current Symptoms: \"cyst under rt eye, more towards cheek\". Swelling around the eye. Cyst seems to gotten worse and yellow discharge since Saturday. Has been taking antibiotic. Cyst size of a quarter. Onset: 2 weeks ago; worsening    Associated Symptoms: NA    Pain Severity: face pain    Temperature: denies fever    What has been tried: Antibiotic, warm compressions    LMP: NA Pregnant: NA    Recommended disposition: See in Office Today    Care advice provided, patient verbalizes understanding; denies any other questions or concerns; instructed to call back for any new or worsening symptoms. Left message on E[facility] chat for scheduling    Attention Provider: Thank you for allowing me to participate in the care of your patient. The patient was connected to triage in response to information provided to the ECC. Please do not respond through this encounter as the response is not directed to a shared pool.       Reason for Disposition   Boil > 1/2 inch across (> 12 mm; larger than a marble) and center is soft or pus colored    Protocols used: BOIL (SKIN ABSCESS)-ADULT-OH

## 2022-06-01 ENCOUNTER — OFFICE VISIT (OUTPATIENT)
Dept: FAMILY MEDICINE CLINIC | Age: 72
End: 2022-06-01
Payer: MEDICARE

## 2022-06-01 VITALS
HEART RATE: 70 BPM | WEIGHT: 243.2 LBS | HEIGHT: 68 IN | BODY MASS INDEX: 36.86 KG/M2 | DIASTOLIC BLOOD PRESSURE: 62 MMHG | RESPIRATION RATE: 18 BRPM | OXYGEN SATURATION: 96 % | SYSTOLIC BLOOD PRESSURE: 100 MMHG | TEMPERATURE: 98.7 F

## 2022-06-01 DIAGNOSIS — L03.211 FACIAL CELLULITIS: ICD-10-CM

## 2022-06-01 DIAGNOSIS — I10 ESSENTIAL HYPERTENSION, BENIGN: Primary | ICD-10-CM

## 2022-06-01 DIAGNOSIS — I42.9 CARDIOMYOPATHY, UNSPECIFIED TYPE (HCC): ICD-10-CM

## 2022-06-01 DIAGNOSIS — H57.89 PERIORBITAL SWELLING: ICD-10-CM

## 2022-06-01 DIAGNOSIS — H10.9 CONJUNCTIVITIS OF RIGHT EYE, UNSPECIFIED CONJUNCTIVITIS TYPE: ICD-10-CM

## 2022-06-01 DIAGNOSIS — I50.22 SYSTOLIC CHF, CHRONIC (HCC): ICD-10-CM

## 2022-06-01 DIAGNOSIS — L02.01 ABSCESS OF FACE: ICD-10-CM

## 2022-06-01 DIAGNOSIS — Z95.5 S/P CORONARY ARTERY STENT PLACEMENT: ICD-10-CM

## 2022-06-01 PROCEDURE — G8417 CALC BMI ABV UP PARAM F/U: HCPCS | Performed by: FAMILY MEDICINE

## 2022-06-01 PROCEDURE — G0463 HOSPITAL OUTPT CLINIC VISIT: HCPCS | Performed by: FAMILY MEDICINE

## 2022-06-01 PROCEDURE — 1123F ACP DISCUSS/DSCN MKR DOCD: CPT | Performed by: FAMILY MEDICINE

## 2022-06-01 PROCEDURE — G8752 SYS BP LESS 140: HCPCS | Performed by: FAMILY MEDICINE

## 2022-06-01 PROCEDURE — G8536 NO DOC ELDER MAL SCRN: HCPCS | Performed by: FAMILY MEDICINE

## 2022-06-01 PROCEDURE — G8510 SCR DEP NEG, NO PLAN REQD: HCPCS | Performed by: FAMILY MEDICINE

## 2022-06-01 PROCEDURE — 10061 I&D ABSCESS COMP/MULTIPLE: CPT | Performed by: FAMILY MEDICINE

## 2022-06-01 PROCEDURE — 99213 OFFICE O/P EST LOW 20 MIN: CPT | Performed by: FAMILY MEDICINE

## 2022-06-01 PROCEDURE — G8427 DOCREV CUR MEDS BY ELIG CLIN: HCPCS | Performed by: FAMILY MEDICINE

## 2022-06-01 PROCEDURE — G8754 DIAS BP LESS 90: HCPCS | Performed by: FAMILY MEDICINE

## 2022-06-01 PROCEDURE — 3017F COLORECTAL CA SCREEN DOC REV: CPT | Performed by: FAMILY MEDICINE

## 2022-06-01 PROCEDURE — 1101F PT FALLS ASSESS-DOCD LE1/YR: CPT | Performed by: FAMILY MEDICINE

## 2022-06-01 RX ORDER — MUPIROCIN 20 MG/G
OINTMENT TOPICAL DAILY
Qty: 22 G | Refills: 2 | Status: SHIPPED | OUTPATIENT
Start: 2022-06-01 | End: 2022-07-12 | Stop reason: ALTCHOICE

## 2022-06-01 RX ORDER — AMOXICILLIN AND CLAVULANATE POTASSIUM 875; 125 MG/1; MG/1
1 TABLET, FILM COATED ORAL 2 TIMES DAILY
Qty: 20 TABLET | Refills: 0 | Status: SHIPPED | OUTPATIENT
Start: 2022-06-01 | End: 2022-06-14

## 2022-06-01 NOTE — PROGRESS NOTES
HISTORY OF PRESENT ILLNESS  Luz Guthrie is a 67 y.o. male. facial abscess undr rt eye with extensive facial and periorbital redness and swelling Has been on Augmentin x 6 days. Wound is draining purulent ,bloody fluid. No fever or chills  Cyst  The history is provided by the patient. This is a chronic problem. The problem occurs daily. Skin Problem  The history is provided by the patient. This is a new problem. The current episode started more than 1 week ago. The problem occurs daily. The problem has been gradually improving. Review of Systems   Constitutional: Negative for fever and malaise/fatigue. Eyes: Negative for blurred vision. Skin: Positive for rash. Physical Exam  Constitutional:       Appearance: Normal appearance. HENT:      Head: Normocephalic and atraumatic. Right Ear: Tympanic membrane normal.      Left Ear: Tympanic membrane normal.      Nose: Nose normal.      Mouth/Throat:      Mouth: Mucous membranes are moist.   Eyes:      Pupils: Pupils are equal, round, and reactive to light. Skin:     General: Skin is warm and dry. Findings: Erythema and rash present. Comments: Draining rt zygomatic abscess with extensive facial and periorbital swelling and eyrhema   Neurological:      General: No focal deficit present. Mental Status: He is alert and oriented to person, place, and time. ASSESSMENT and PLAN  Diagnoses and all orders for this visit:    1. Essential hypertension, benign    2. Abscess of face  -     amoxicillin-clavulanate (AUGMENTIN) 875-125 mg per tablet; Take 1 Tablet by mouth two (2) times a day for 10 days.  -     mupirocin (BACTROBAN) 2 % ointment; Apply  to affected area daily.  -     DRAIN SKIN ABSCESS [address] [facility]    3. Cardiomyopathy, unspecified type ([facility]    4. Systolic CHF, chronic ([facility]    5. S/P coronary artery stent placement    6. Facial cellulitis  -     amoxicillin-clavulanate (AUGMENTIN) 875-125 mg per tablet;  Take 1 Tablet by mouth two (2) times a day for 10 days. 7. Conjunctivitis of right eye, unspecified conjunctivitis type    8. Periorbital swelling      Follow-up and Dispositions    · Return in about 2 days (around 6/3/2022). Subjective:    Jodie Soliz is a 67 y.o. male who presents for lesion removal. We have discussed this procedure, including option of not performing surgery, technique of surgery and potential for scarring at an earlier visit. Oubjective:   Patient appears well. Visit Vitals  /62 (BP 1 Location: Right upper arm, BP Patient Position: Sitting, BP Cuff Size: Adult)   Pulse 70   Temp 98.7 °F (37.1 °C) (Oral)   Resp 18   Ht 5' 8\" (1.727 m)   Wt 243 lb 3.2 oz (110.3 kg)   SpO2 96%   BMI 36.98 kg/m²     Skin: extensive swelling and erythema rt maxilla ,with draining 2x2 cm abscess    Assessment:   skin abscess    Procedure Note:   After informed consent was obtained, using betadyne for cleansing and 1% lidocaine with epinephrine for anesthetic, with sterile technique, incision and drainage of abscess was performed. Antibiotic dressing is applied, and wound care instructions provided. Be alert for any signs of cutaneous infection. The procedure was well tolerated without complications. Follow up: return for recheck in 2 days.

## 2022-06-01 NOTE — PROGRESS NOTES
Chief Complaint   Patient presents with    Cyst     Pt has infected cyst under R eye.       1. \"Have you been to the ER, urgent care clinic since your last visit? Hospitalized since your last visit? \" No    2. \"Have you seen or consulted any other health care providers outside of the [address] [place] [address] since your last visit? \" No     3. For patients aged 39-70: Has the patient had a colonoscopy / FIT/ Cologuard? Yes - Care Gap present. Most recent result on file      If the patient is female:    4. For patients aged 41-77: Has the patient had a mammogram within the past 2 years? NA - based on age or sex      11. For patients aged 21-65: Has the patient had a pap smear?  NA - based on age or sex    Health Maintenance Due   Topic Date Due    COVID-19 Vaccine (1) Never done    Shingrix Vaccine Age 50> (1 of 2) Never done    Colorectal Cancer Screening Combo  05/14/2017    Pneumococcal 65+ years (2 - PPSV23 or PCV20) 08/15/2019    Medicare Yearly Exam  06/18/2022

## 2022-06-03 ENCOUNTER — OFFICE VISIT (OUTPATIENT)
Dept: FAMILY MEDICINE CLINIC | Age: 72
End: 2022-06-03
Payer: MEDICARE

## 2022-06-03 VITALS
BODY MASS INDEX: 36.83 KG/M2 | WEIGHT: 243 LBS | HEIGHT: 68 IN | SYSTOLIC BLOOD PRESSURE: 100 MMHG | HEART RATE: 83 BPM | OXYGEN SATURATION: 96 % | RESPIRATION RATE: 18 BRPM | TEMPERATURE: 97.5 F | DIASTOLIC BLOOD PRESSURE: 68 MMHG

## 2022-06-03 DIAGNOSIS — L02.01 ABSCESS OF FACE: Primary | ICD-10-CM

## 2022-06-03 DIAGNOSIS — I10 ESSENTIAL HYPERTENSION, BENIGN: ICD-10-CM

## 2022-06-03 DIAGNOSIS — L03.211 FACIAL CELLULITIS: ICD-10-CM

## 2022-06-03 DIAGNOSIS — E78.2 MIXED HYPERLIPIDEMIA: ICD-10-CM

## 2022-06-03 DIAGNOSIS — I42.9 CARDIOMYOPATHY, UNSPECIFIED TYPE (HCC): ICD-10-CM

## 2022-06-03 PROCEDURE — G8510 SCR DEP NEG, NO PLAN REQD: HCPCS | Performed by: FAMILY MEDICINE

## 2022-06-03 PROCEDURE — G8752 SYS BP LESS 140: HCPCS | Performed by: FAMILY MEDICINE

## 2022-06-03 PROCEDURE — G8754 DIAS BP LESS 90: HCPCS | Performed by: FAMILY MEDICINE

## 2022-06-03 PROCEDURE — G8536 NO DOC ELDER MAL SCRN: HCPCS | Performed by: FAMILY MEDICINE

## 2022-06-03 PROCEDURE — 99213 OFFICE O/P EST LOW 20 MIN: CPT | Performed by: FAMILY MEDICINE

## 2022-06-03 PROCEDURE — 3017F COLORECTAL CA SCREEN DOC REV: CPT | Performed by: FAMILY MEDICINE

## 2022-06-03 PROCEDURE — 1101F PT FALLS ASSESS-DOCD LE1/YR: CPT | Performed by: FAMILY MEDICINE

## 2022-06-03 PROCEDURE — 1123F ACP DISCUSS/DSCN MKR DOCD: CPT | Performed by: FAMILY MEDICINE

## 2022-06-03 PROCEDURE — G0463 HOSPITAL OUTPT CLINIC VISIT: HCPCS | Performed by: FAMILY MEDICINE

## 2022-06-03 PROCEDURE — G8417 CALC BMI ABV UP PARAM F/U: HCPCS | Performed by: FAMILY MEDICINE

## 2022-06-03 PROCEDURE — G8428 CUR MEDS NOT DOCUMENT: HCPCS | Performed by: FAMILY MEDICINE

## 2022-06-03 NOTE — PROGRESS NOTES
Chief Complaint   Patient presents with    Skin Problem     F/U on R eye abcess. 1. \"Have you been to the ER, urgent care clinic since your last visit? Hospitalized since your last visit? \" No    2. \"Have you seen or consulted any other health care providers outside of the [address] [place] [address] since your last visit? \" No     3. For patients aged 39-70: Has the patient had a colonoscopy / FIT/ Cologuard? Yes - Care Gap present. Most recent result on file      If the patient is female:    4. For patients aged 41-77: Has the patient had a mammogram within the past 2 years? NA - based on age or sex      11. For patients aged 21-65: Has the patient had a pap smear?  NA - based on age or sex    Health Maintenance Due   Topic Date Due    COVID-19 Vaccine (1) Never done    Shingrix Vaccine Age 50> (1 of 2) Never done    Colorectal Cancer Screening Combo  05/14/2017    Pneumococcal 65+ years (2 - PPSV23 or PCV20) 08/15/2019    Lipid Screen  06/17/2022    Medicare Yearly Exam  06/18/2022

## 2022-06-04 NOTE — PROGRESS NOTES
HISTORY OF PRESENT ILLNESS  Hayder Dao is a 67 y.o. male. facial abscess undr rt eye with improving facial and periorbital redness and swelling Has been on Augmentin x 9days. Wound is draining purulent ,bloody fluid. No fever or chills  Cyst  The history is provided by the patient. This is a chronic problem. The problem occurs daily. Skin Problem  The history is provided by the patient. This is a new problem. The current episode started more than 1 week ago. The problem occurs daily. The problem has been gradually improving. Review of Systems   Constitutional: Negative for fever and malaise/fatigue. Eyes: Negative for blurred vision. Skin: Positive for rash. Physical Exam  Constitutional:       Appearance: Normal appearance. HENT:      Head: Normocephalic and atraumatic. Right Ear: Tympanic membrane normal.      Left Ear: Tympanic membrane normal.      Nose: Nose normal.      Mouth/Throat:      Mouth: Mucous membranes are moist.   Eyes:      Pupils: Pupils are equal, round, and reactive to light. Skin:     General: Skin is warm and dry. Findings: Erythema and rash present. Comments: Draining rt zygomatic abscess with extensive facial and periorbital swelling and eyrhema   Neurological:      General: No focal deficit present. Mental Status: He is alert and oriented to person, place, and time. ASSESSMENT and PLAN  Diagnoses and all orders for this visit:    1. Abscess of face,improving rapidly;. Continue current meds and treatments,and call if you have any questions. 2. Facial cellulitis    3. Essential hypertension, benign    4. Cardiomyopathy, unspecified type ([facility]    5. Mixed hyperlipidemia      Follow-up and Dispositions    · Return in about 1 month (around 7/3/2022).         Subjective:    Hayder Dao is a 67 y.o. male who presents for lesion removal. We have discussed this procedure, including option of not performing surgery, technique of surgery and potential for scarring at an earlier visit. Oubjective:   Patient appears well. Visit Vitals  /68 (BP 1 Location: Left upper arm, BP Patient Position: Sitting, BP Cuff Size: Adult)   Pulse 83   Temp 97.5 °F (36.4 °C) (Oral)   Resp 18   Ht 5' 8\" (1.727 m)   Wt 243 lb (110.2 kg)   SpO2 96%   BMI 36.95 kg/m²     Skin: extensive swelling and erythema rt maxilla ,with draining 2x2 cm abscess    Assessment:   skin abscess    Procedure Note:   After informed consent was obtained, using betadyne for cleansing and 1% lidocaine with epinephrine for anesthetic, with sterile technique, incision and drainage of abscess was performed. Antibiotic dressing is applied, and wound care instructions provided. Be alert for any signs of cutaneous infection. The procedure was well tolerated without complications. Follow up: return for recheck in 2 days.

## 2022-07-12 ENCOUNTER — OFFICE VISIT (OUTPATIENT)
Dept: FAMILY MEDICINE CLINIC | Age: 72
End: 2022-07-12
Payer: MEDICARE

## 2022-07-12 VITALS
DIASTOLIC BLOOD PRESSURE: 66 MMHG | SYSTOLIC BLOOD PRESSURE: 94 MMHG | RESPIRATION RATE: 20 BRPM | WEIGHT: 246.2 LBS | TEMPERATURE: 97.9 F | HEIGHT: 68 IN | OXYGEN SATURATION: 95 % | HEART RATE: 70 BPM | BODY MASS INDEX: 37.31 KG/M2

## 2022-07-12 DIAGNOSIS — E66.01 SEVERE OBESITY (BMI 35.0-39.9) WITH COMORBIDITY (HCC): ICD-10-CM

## 2022-07-12 DIAGNOSIS — I48.0 PAROXYSMAL ATRIAL FIBRILLATION (HCC): ICD-10-CM

## 2022-07-12 DIAGNOSIS — I50.32 CHRONIC DIASTOLIC HF (HEART FAILURE) (HCC): ICD-10-CM

## 2022-07-12 DIAGNOSIS — I10 ESSENTIAL HYPERTENSION, BENIGN: Primary | ICD-10-CM

## 2022-07-12 DIAGNOSIS — I42.9 CARDIOMYOPATHY, UNSPECIFIED TYPE (HCC): ICD-10-CM

## 2022-07-12 DIAGNOSIS — L02.01 ABSCESS OF FACE: ICD-10-CM

## 2022-07-12 DIAGNOSIS — H57.89 PERIORBITAL SWELLING: ICD-10-CM

## 2022-07-12 DIAGNOSIS — I50.22 SYSTOLIC CHF, CHRONIC (HCC): ICD-10-CM

## 2022-07-12 PROCEDURE — 1123F ACP DISCUSS/DSCN MKR DOCD: CPT | Performed by: FAMILY MEDICINE

## 2022-07-12 PROCEDURE — G8417 CALC BMI ABV UP PARAM F/U: HCPCS | Performed by: FAMILY MEDICINE

## 2022-07-12 PROCEDURE — 3017F COLORECTAL CA SCREEN DOC REV: CPT | Performed by: FAMILY MEDICINE

## 2022-07-12 PROCEDURE — G8427 DOCREV CUR MEDS BY ELIG CLIN: HCPCS | Performed by: FAMILY MEDICINE

## 2022-07-12 PROCEDURE — G8536 NO DOC ELDER MAL SCRN: HCPCS | Performed by: FAMILY MEDICINE

## 2022-07-12 PROCEDURE — G8752 SYS BP LESS 140: HCPCS | Performed by: FAMILY MEDICINE

## 2022-07-12 PROCEDURE — 99213 OFFICE O/P EST LOW 20 MIN: CPT | Performed by: FAMILY MEDICINE

## 2022-07-12 PROCEDURE — G8754 DIAS BP LESS 90: HCPCS | Performed by: FAMILY MEDICINE

## 2022-07-12 PROCEDURE — G8510 SCR DEP NEG, NO PLAN REQD: HCPCS | Performed by: FAMILY MEDICINE

## 2022-07-12 PROCEDURE — G0463 HOSPITAL OUTPT CLINIC VISIT: HCPCS | Performed by: FAMILY MEDICINE

## 2022-07-12 PROCEDURE — 1101F PT FALLS ASSESS-DOCD LE1/YR: CPT | Performed by: FAMILY MEDICINE

## 2022-07-12 NOTE — PROGRESS NOTES
Patient is accompanied by patient I have received verbal consent from Dangelo Oconnell to discuss any/all medical information while they are present in the room. Chief Complaint   Patient presents with    Follow-up     eye surgery, abcess      Visit Vitals  BP 94/66 (BP 1 Location: Left arm, BP Patient Position: Sitting, BP Cuff Size: Large adult)   Pulse 70   Temp 97.9 °F (36.6 °C) (Oral)   Resp 20   Ht 5' 8\" (1.727 m)   Wt 246 lb 3.2 oz (111.7 kg)   SpO2 95%   BMI 37.43 kg/m²       1. Have you been to the ER, urgent care clinic since your last visit? Hospitalized since your last visit? No    2. Have you seen or consulted any other health care providers outside of the [address] [place] [address] since your last visit? Include any pap smears or colon screening.  No

## 2022-07-12 NOTE — PROGRESS NOTES
HISTORY OF PRESENT ILLNESS  Keaton Waterman is a 67 y.o. male. f/u hbp,nicm,cad. Has had surgery onrt maxillary abscess and is improving nicely  Follow-up  The history is provided by the patient. This is a chronic problem. The problem has not changed since onset. Pertinent negatives include no chest pain. Hypertension   The history is provided by the patient. This is a chronic problem. The problem has not changed since onset. Pertinent negatives include no chest pain, no malaise/fatigue, no blurred vision, no peripheral edema and no dizziness. Breathing Problem  The history is provided by the patient. This is a chronic problem. The problem has been gradually improving. Pertinent negatives include no chest pain and no leg swelling. Review of Systems   Constitutional: Negative for malaise/fatigue. Eyes: Negative for blurred vision. Cardiovascular: Negative for chest pain and leg swelling. Neurological: Negative for dizziness. Physical Exam  Constitutional:       Appearance: Normal appearance. He is obese. HENT:      Right Ear: Tympanic membrane normal.      Left Ear: Tympanic membrane normal.      Nose: Nose normal.      Mouth/Throat:      Mouth: Mucous membranes are moist.   Cardiovascular:      Rate and Rhythm: Normal rate and regular rhythm. Pulses: Normal pulses. Heart sounds: Normal heart sounds. Pulmonary:      Effort: Pulmonary effort is normal.      Breath sounds: Normal breath sounds. Abdominal:      Palpations: Abdomen is soft. Musculoskeletal:      Right lower leg: No edema. Left lower leg: No edema. Neurological:      Mental Status: He is alert. ASSESSMENT and PLAN  Diagnoses and all orders for this visit:    1. Essential hypertension, benign,controlled    2. Severe obesity (BMI 35.0-39. 9) with comorbidity ([address]    3. Systolic CHF, chronic (HCC),compensated    4. Chronic diastolic HF (heart failure) ([facility])    5. Cardiomyopathy, unspecified type ([address]    6. Paroxysmal atrial fibrillation (HCC)    7. Periorbital swelling    8. Abscess of face,resolved    Doing well,continue current meds and treatments    Follow-up and Dispositions    · Return in about 3 months (around 10/12/2022).

## 2022-08-10 RX ORDER — TAMSULOSIN HYDROCHLORIDE 0.4 MG/1
CAPSULE ORAL
Qty: 90 CAPSULE | Refills: 0 | Status: SHIPPED | OUTPATIENT
Start: 2022-08-10

## 2022-10-12 ENCOUNTER — OFFICE VISIT (OUTPATIENT)
Dept: FAMILY MEDICINE CLINIC | Age: 72
End: 2022-10-12
Payer: MEDICARE

## 2022-10-12 VITALS
BODY MASS INDEX: 38.16 KG/M2 | SYSTOLIC BLOOD PRESSURE: 108 MMHG | HEART RATE: 68 BPM | WEIGHT: 251 LBS | DIASTOLIC BLOOD PRESSURE: 78 MMHG

## 2022-10-12 DIAGNOSIS — Z23 NEEDS FLU SHOT: ICD-10-CM

## 2022-10-12 DIAGNOSIS — I48.0 PAROXYSMAL ATRIAL FIBRILLATION (HCC): ICD-10-CM

## 2022-10-12 DIAGNOSIS — Z95.5 S/P CORONARY ARTERY STENT PLACEMENT: ICD-10-CM

## 2022-10-12 DIAGNOSIS — E78.2 MIXED HYPERLIPIDEMIA: ICD-10-CM

## 2022-10-12 DIAGNOSIS — I10 ESSENTIAL HYPERTENSION, BENIGN: ICD-10-CM

## 2022-10-12 DIAGNOSIS — R73.9 HYPERGLYCEMIA: ICD-10-CM

## 2022-10-12 DIAGNOSIS — Z00.00 MEDICARE ANNUAL WELLNESS VISIT, SUBSEQUENT: Primary | ICD-10-CM

## 2022-10-12 DIAGNOSIS — R81 GLUCOSURIA: ICD-10-CM

## 2022-10-12 DIAGNOSIS — I50.22 SYSTOLIC CHF, CHRONIC (HCC): ICD-10-CM

## 2022-10-12 DIAGNOSIS — R35.1 NOCTURIA: ICD-10-CM

## 2022-10-12 DIAGNOSIS — I42.9 CARDIOMYOPATHY, UNSPECIFIED TYPE (HCC): ICD-10-CM

## 2022-10-12 LAB
ALBUMIN SERPL-MCNC: 4.1 G/DL (ref 3.5–5)
ALBUMIN/GLOB SERPL: 1.8 {RATIO} (ref 1.1–2.2)
ALP SERPL-CCNC: 36 U/L (ref 45–117)
ALT SERPL-CCNC: 36 U/L (ref 12–78)
ANION GAP SERPL CALC-SCNC: 6 MMOL/L (ref 5–15)
AST SERPL-CCNC: 22 U/L (ref 15–37)
BASOPHILS # BLD: 0.1 K/UL (ref 0–0.1)
BASOPHILS NFR BLD: 1 % (ref 0–1)
BILIRUB SERPL-MCNC: 1.5 MG/DL (ref 0.2–1)
BILIRUB UR QL STRIP: NEGATIVE
BUN SERPL-MCNC: 15 MG/DL (ref 6–20)
BUN/CREAT SERPL: 10 (ref 12–20)
CALCIUM SERPL-MCNC: 8.8 MG/DL (ref 8.5–10.1)
CHLORIDE SERPL-SCNC: 110 MMOL/L (ref 97–108)
CHOLEST SERPL-MCNC: 132 MG/DL
CO2 SERPL-SCNC: 25 MMOL/L (ref 21–32)
CREAT SERPL-MCNC: 1.51 MG/DL (ref 0.7–1.3)
DIFFERENTIAL METHOD BLD: ABNORMAL
EOSINOPHIL # BLD: 0.2 K/UL (ref 0–0.4)
EOSINOPHIL NFR BLD: 3 % (ref 0–7)
ERYTHROCYTE [DISTWIDTH] IN BLOOD BY AUTOMATED COUNT: 13.2 % (ref 11.5–14.5)
GLOBULIN SER CALC-MCNC: 2.3 G/DL (ref 2–4)
GLUCOSE SERPL-MCNC: 113 MG/DL (ref 65–100)
GLUCOSE UR-MCNC: NORMAL MG/DL
HBA1C MFR BLD HPLC: 5.4 %
HCT VFR BLD AUTO: 45.1 % (ref 36.6–50.3)
HDLC SERPL-MCNC: 57 MG/DL
HDLC SERPL: 2.3 {RATIO} (ref 0–5)
HGB BLD-MCNC: 15.1 G/DL (ref 12.1–17)
IMM GRANULOCYTES # BLD AUTO: 0 K/UL (ref 0–0.04)
IMM GRANULOCYTES NFR BLD AUTO: 0 % (ref 0–0.5)
KETONES P FAST UR STRIP-MCNC: NEGATIVE MG/DL
LDLC SERPL CALC-MCNC: 48.8 MG/DL (ref 0–100)
LYMPHOCYTES # BLD: 1.3 K/UL (ref 0.8–3.5)
LYMPHOCYTES NFR BLD: 20 % (ref 12–49)
MCH RBC QN AUTO: 32.7 PG (ref 26–34)
MCHC RBC AUTO-ENTMCNC: 33.5 G/DL (ref 30–36.5)
MCV RBC AUTO: 97.6 FL (ref 80–99)
MONOCYTES # BLD: 0.5 K/UL (ref 0–1)
MONOCYTES NFR BLD: 7 % (ref 5–13)
NEUTS SEG # BLD: 4.5 K/UL (ref 1.8–8)
NEUTS SEG NFR BLD: 69 % (ref 32–75)
NRBC # BLD: 0 K/UL (ref 0–0.01)
NRBC BLD-RTO: 0 PER 100 WBC
PH UR STRIP: 5.5 [PH] (ref 4.6–8)
PLATELET # BLD AUTO: 136 K/UL (ref 150–400)
PMV BLD AUTO: 10.8 FL (ref 8.9–12.9)
POTASSIUM SERPL-SCNC: 4.2 MMOL/L (ref 3.5–5.1)
PROT SERPL-MCNC: 6.4 G/DL (ref 6.4–8.2)
PROT UR QL STRIP: NEGATIVE
PSA SERPL-MCNC: 3.6 NG/ML (ref 0.01–4)
RBC # BLD AUTO: 4.62 M/UL (ref 4.1–5.7)
SODIUM SERPL-SCNC: 141 MMOL/L (ref 136–145)
SP GR UR STRIP: 1.02 (ref 1–1.03)
TRIGL SERPL-MCNC: 131 MG/DL (ref ?–150)
UA UROBILINOGEN AMB POC: NORMAL (ref 0.2–1)
URINALYSIS CLARITY POC: CLEAR
URINALYSIS COLOR POC: YELLOW
URINE BLOOD POC: NORMAL
URINE LEUKOCYTES POC: NEGATIVE
URINE NITRITES POC: NEGATIVE
VLDLC SERPL CALC-MCNC: 26.2 MG/DL
WBC # BLD AUTO: 6.5 K/UL (ref 4.1–11.1)

## 2022-10-12 PROCEDURE — G8432 DEP SCR NOT DOC, RNG: HCPCS | Performed by: FAMILY MEDICINE

## 2022-10-12 PROCEDURE — 3017F COLORECTAL CA SCREEN DOC REV: CPT | Performed by: FAMILY MEDICINE

## 2022-10-12 PROCEDURE — G8752 SYS BP LESS 140: HCPCS | Performed by: FAMILY MEDICINE

## 2022-10-12 PROCEDURE — 90694 VACC AIIV4 NO PRSRV 0.5ML IM: CPT | Performed by: FAMILY MEDICINE

## 2022-10-12 PROCEDURE — 1101F PT FALLS ASSESS-DOCD LE1/YR: CPT | Performed by: FAMILY MEDICINE

## 2022-10-12 PROCEDURE — G8427 DOCREV CUR MEDS BY ELIG CLIN: HCPCS | Performed by: FAMILY MEDICINE

## 2022-10-12 PROCEDURE — G8754 DIAS BP LESS 90: HCPCS | Performed by: FAMILY MEDICINE

## 2022-10-12 PROCEDURE — G8536 NO DOC ELDER MAL SCRN: HCPCS | Performed by: FAMILY MEDICINE

## 2022-10-12 PROCEDURE — G0463 HOSPITAL OUTPT CLINIC VISIT: HCPCS | Performed by: FAMILY MEDICINE

## 2022-10-12 PROCEDURE — 1123F ACP DISCUSS/DSCN MKR DOCD: CPT | Performed by: FAMILY MEDICINE

## 2022-10-12 PROCEDURE — 99213 OFFICE O/P EST LOW 20 MIN: CPT | Performed by: FAMILY MEDICINE

## 2022-10-12 PROCEDURE — 83036 HEMOGLOBIN GLYCOSYLATED A1C: CPT | Performed by: FAMILY MEDICINE

## 2022-10-12 PROCEDURE — 81003 URINALYSIS AUTO W/O SCOPE: CPT | Performed by: FAMILY MEDICINE

## 2022-10-12 PROCEDURE — G0439 PPPS, SUBSEQ VISIT: HCPCS | Performed by: FAMILY MEDICINE

## 2022-10-12 PROCEDURE — G8417 CALC BMI ABV UP PARAM F/U: HCPCS | Performed by: FAMILY MEDICINE

## 2022-10-12 RX ORDER — FUROSEMIDE 20 MG/1
TABLET ORAL
Qty: 90 TABLET | Refills: 3
Start: 2022-10-12

## 2022-10-12 NOTE — PROGRESS NOTES
Subjective:     Leigh Rashid is a 67 y.o. male presenting for annual exam and complete physical.    Patient Active Problem List   Diagnosis Code    CAD (coronary artery disease) I25.10    Mixed hyperlipidemia E78.2    Cardiomyopathy ([facility] [address] I42.9    Encounter for long-term (current) use of other medications Z79.899    Nocturia R35.1    Abnormal PSA R97.20    Essential hypertension, benign I10    GERD (gastroesophageal reflux disease) K21.9    Hematochezia K92.1    Screen for colon cancer Z12.11    Automatic implantable cardioverter-defibrillator in situ Z95.810    Low back pain M54.50    Severe obesity (BMI 35.0-39. 9) with comorbidity ([facility]) E66.01    A-fib ([facility]) I48.91    Presence of biventricular AICD Z95.810    Abnormal stress ECG R94.39    VT (ventricular tachycardia) I47.20    Chronic diastolic HF (heart failure) ([facility]) R49.77    Systolic CHF, chronic ([facility]) I50.22    S/P coronary artery stent placement Z95.5    VF (ventricular fibrillation) ([facility]) I49.01    Chronic renal disease, stage III N18.30     Patient Active Problem List    Diagnosis Date Noted    Chronic renal disease, stage III 05/31/2022    A-fib ([facility] [address] 08/10/2018    Presence of biventricular AICD 08/10/2018    Abnormal stress ECG 08/10/2018    VT (ventricular tachycardia) 08/10/2018    Chronic diastolic HF (heart failure) ([facility] [facility] 79/19/7366    Systolic CHF, chronic ([facility]) 08/10/2018    S/P coronary artery stent placement 08/10/2018    VF (ventricular fibrillation) ([facility] [facility] 08/10/2018    Severe obesity (BMI 35.0-39. 9) with comorbidity ([facility] 04/19/2018    Low back pain 07/02/2013    Automatic implantable cardioverter-defibrillator in situ 06/11/2012    GERD (gastroesophageal reflux disease) 05/14/2012    Hematochezia 05/14/2012    Screen for colon cancer 05/14/2012    CAD (coronary artery disease) 02/16/2011    Mixed hyperlipidemia 02/16/2011    Cardiomyopathy ([facility] 02/16/2011    Encounter for long-term (current) use of other medications 02/16/2011    Nocturia 02/16/2011    Abnormal PSA 02/16/2011    Essential hypertension, benign 02/16/2011     Current Outpatient Medications   Medication Sig Dispense Refill    tamsulosin (FLOMAX) 0.4 mg capsule TAKE 1 CAPSULE EVERY DAY 90 Capsule 0    sacubitriL-valsartan (Entresto) 24-26 mg tablet Take 1 Tablet by mouth two (2) times a day. 60 Tablet 11    empagliflozin (Jardiance) 10 mg tablet Take 1 Tablet by mouth daily. Indications: heart failure with reduced ejection fraction 90 Tablet 3    empagliflozin (Jardiance) 10 mg tablet Take 1 Tablet by mouth daily. Indications: heart failure with reduced ejection fraction 14 Tablet 0    ezetimibe (ZETIA) 10 mg tablet TAKE 1 TABLET EVERY DAY TO LOWER CHOLESTEROL 90 Tablet 1    amiodarone (CORDARONE) 200 mg tablet TAKE 1 TABLET EVERY DAY FOR HEART RYTHYM (Patient taking differently: Take 100 mg by mouth daily.) 90 Tablet 1    clobetasoL (TEMOVATE) 0.05 % ointment       rosuvastatin (CRESTOR) 20 mg tablet TAKE 1 TABLET BY MOUTH DAILY FOR CHOLESTEROL BEST IN THE EVENING 90 Tablet 2    carvediloL (COREG) 25 mg tablet TAKE 1 TABLET BY MOUTH TWO (2) TIMES DAILY (WITH MEALS). 180 Tablet 3    Eliquis 5 mg tablet TAKE 1 TABLET BY MOUTH TWO (2) TIMES A DAY. FOR BLOOD THINNER 180 Tab 3    aspirin delayed-release 81 mg tablet Take  by mouth daily. nitroglycerin (NITROLINGUAL) 400 mcg/spray spray 1 Spray by SubLINGual route every five (5) minutes as needed. 1 Bottle 1    cyanocobalamin 1,000 mcg tablet Take 1,000 mcg by mouth daily. omega-3-dha-epa-dpa-fish oil 1,050-1,200 mg cap Take 1 Cap by mouth two (2) times a day. cholecalciferol (VITAMIN D3) (1000 Units /25 mcg) tablet Take  by mouth daily. multivitamins-minerals-lutein (MEN'S CENTRUM SILVER WITH LUTEIN) tab tablet Take 1 Tab by mouth daily.        No Known Allergies  Past Medical History:   Diagnosis Date    A-fib ([facility] 8/10/2018    Abnormal PSA 2/16/2011    Abnormal stress ECG 8/10/2018    CAD (coronary artery disease) 2/16/2011    Cancer ([facility] [address]     Cardiomyopathy ([facility] 2/16/2011    Chronic diastolic HF (heart failure) ([facility] [facility] 8/10/2018    Encounter for long-term (current) use of other medications 2/16/2011    Esophageal reflux 2/16/2011    Essential hypertension, benign 2/16/2011    GERD (gastroesophageal reflux disease) 5/14/2012    Heart attack ([facility] [address]     Long term current use of anticoagulant therapy     Low back pain 7/2/2013    Mixed hyperlipidemia 2/16/2011    Nocturia 2/16/2011    Pacemaker     Presence of biventricular AICD 8/10/2018    [employer] BIVICD implanted 5/2017    S/P coronary artery stent placement 8/10/2018    8/9/18 PCI/SUREKHA to LAD, RCA x 2    Systolic CHF, chronic (HCC) 8/10/2018    VF (ventricular fibrillation) ([facility] [facility] 8/10/2018    VT ([facility]ricular tachycardia) [facility] 8/10/2018     Past Surgical History:   Procedure Laterality Date    HX CERVICAL FUSION  1997    c4,5,6    HX CORONARY STENT PLACEMENT      HX HEART CATHETERIZATION      HX PACEMAKER      HX PTCA      [facility] COLONOSCOPY FLX DX W/COLLJ SPEC WHEN PFRMD  5/14/2012         NH EGD TRANSORAL BIOPSY SINGLE/MULTIPLE  5/14/2012         NH REMOVAL GALLBLADDER       Family History   Problem Relation Age of Onset    Lung Disease Mother     Cancer Mother     Alcohol abuse Father     Heart Disease Father      Social History     Tobacco Use    Smoking status: Never    Smokeless tobacco: Never   Substance Use Topics    Alcohol use: No             Review of Systems  Constitutional: negative  Eyes: negative  Ears, nose, mouth, throat, and face: negative  Respiratory: negative  Cardiovascular: negative  Gastrointestinal: negative  Genitourinary:positive for frequency and nocturia  Musculoskeletal:negative  Neurological: negative  Behavioral/Psych: negative    Objective: There were no vitals taken for this visit.   Physical exam:   Mental status - alert, oriented to person, place, and time  Eyes - pupils equal and reactive, extraocular eye movements intact  Ears - bilateral TM's and external ear canals normal  Nose - normal and patent, no erythema, discharge or polyps  Mouth - mucous membranes moist, pharynx normal without lesions  Neck - supple, no significant adenopathy, carotids upstroke normal bilaterally, no bruits, thyroid exam: thyroid is normal in size without nodules or tenderness  Chest - clear to auscultation, no wheezes, rales or rhonchi, symmetric air entry  Heart - normal rate, regular rhythm, normal S1, S2, no murmurs, rubs, clicks or gallops  Rectal - negative without mass, lesions or tenderness, stool guaiac negative, PROSTATE EXAM: smooth and symmetric without nodules or tenderness  Musculoskeletal - no joint tenderness, deformity or swelling  Extremities - peripheral pulses normal, 1= pedal edema, no clubbing or cyanosis     Assessment/Plan:         continue present plan, routine labs ordered, call if any problems. Diagnoses and all orders for this visit:    1. Medicare annual wellness visit, subsequent    2. Essential hypertension, benign  -     METABOLIC PANEL, COMPREHENSIVE; Future  -     CBC WITH AUTOMATED DIFF; Future  -     AMB POC URINALYSIS DIP STICK AUTO W/O MICRO    3. Systolic CHF, chronic (HCC),will restart lasix prn  -     furosemide (LASIX) 20 mg tablet; TAKE 1 TABLET BY MOUTH EVERY MORNING FOR FLUID    4. Cardiomyopathy, unspecified type ([address]    5. Paroxysmal atrial fibrillation ([address]    6. Mixed hyperlipidemia  -     LIPID PANEL; Future    7. S/P coronary artery stent placement    8. Needs flu shot  -     INFLUENZA, FLUAD, (AGE 65 Y+), IM, PF, 0.5 ML    9. Nocturia  -     PSA, DIAGNOSTIC (PROSTATE SPECIFIC AG); Future  .

## 2022-10-12 NOTE — PROGRESS NOTES
This is the Subsequent Medicare Annual Wellness Exam, performed 12 months or more after the Initial AWV or the last Subsequent AWV    I have reviewed the patient's medical history in detail and updated the computerized patient record. Assessment/Plan   Education and counseling provided:  Are appropriate based on today's review and evaluation    1. Medicare annual wellness visit, subsequent  2. Essential hypertension, benign  3. Systolic CHF, chronic (HCC)  4. Cardiomyopathy, unspecified type ([facility]  5. Paroxysmal atrial fibrillation ([facility]  6. Mixed hyperlipidemia  7. S/P coronary artery stent placement  8. Needs flu shot  -     INFLUENZA, FLUAD, (AGE 65 Y+), IM, PF, 0.5 ML  9. Nocturia       Depression Risk Factor Screening     3 most recent PHQ Screens 7/12/2022   Little interest or pleasure in doing things Not at all   Feeling down, depressed, irritable, or hopeless Not at all   Total Score PHQ 2 0   Trouble falling or staying asleep, or sleeping too much -   Feeling tired or having little energy -   Poor appetite, weight loss, or overeating -   Feeling bad about yourself - or that you are a failure or have let yourself or your family down -   Trouble concentrating on things such as school, work, reading, or watching TV -   Moving or speaking so slowly that other people could have noticed; or the opposite being so fidgety that others notice -   Thoughts of being better off dead, or hurting yourself in some way -   PHQ 9 Score -       Alcohol & Drug Abuse Risk Screen    Do you average more than 1 drink per night or more than 7 drinks a week: No    In the past three months have you have had more than 4 drinks containing alcohol on one occasion: No          Functional Ability and Level of Safety    Hearing: Hearing is good. Activities of Daily Living:   The home contains: handrails  Patient does total self care      Ambulation: with no difficulty     Fall Risk:  Fall Risk Assessment, last 12 mths 6/3/2022 Able to walk? Yes   Fall in past 12 months? 0   Do you feel unsteady? 0   Are you worried about falling 0      Abuse Screen:  Patient is not abused       Cognitive Screening    Has your family/caregiver stated any concerns about your memory: no     Cognitive Screenin    Health Maintenance Due     Health Maintenance Due   Topic Date Due    COVID-19 Vaccine (1) Never done    Shingrix Vaccine Age 49> (1 of 2) Never done    Colorectal Cancer Screening Combo  2017    Pneumococcal 65+ years (2 - PPSV23 or PCV20) 08/15/2019    Lipid Screen  2022    Medicare Yearly Exam  2022    Flu Vaccine (1) 2022       Patient Care Team   Patient Care Team:  Soto Matamoros MD as PCP - [address] [place] [address], Giulia Bhagat MD as PCP - [facility]  EDE Leavitt MD as Physician (Cardiovascular Disease Physician)  Rafa Keller MD as Physician (Cardiovascular Disease Physician)  Nathanael Castleman, NP as Nurse Practitioner (Cardiovascular Disease Physician)  Ozzy Covarrubias NP as Nurse Practitioner (Nurse Practitioner)    History     Patient Active Problem List   Diagnosis Code    CAD (coronary artery disease) I25.10    Mixed hyperlipidemia E78.2    Cardiomyopathy ([facility] [address] I42.9    Encounter for long-term (current) use of other medications Z79.899    Nocturia R35.1    Abnormal PSA R97.20    Essential hypertension, benign I10    GERD (gastroesophageal reflux disease) K21.9    Hematochezia K92.1    Screen for colon cancer Z12.11    Automatic implantable cardioverter-defibrillator in situ Z95.810    Low back pain M54.50    Severe obesity (BMI 35.0-39. 9) with comorbidity (HCC) E66.01    A-fib ([facility] I48.91    Presence of biventricular AICD Z95.810    Abnormal stress ECG R94.39    VT (ventricular tachycardia) I47.20    Chronic diastolic HF (heart failure) (HCC) C76.74    Systolic CHF, chronic ([facility]) I50.22    S/P coronary artery stent placement Z95.5    VF (ventricular fibrillation) ([facility]) I49.01    Chronic renal disease, stage III N18.30     Past Medical History:   Diagnosis Date    A-fib ([facility] 8/10/2018    Abnormal PSA 2/16/2011    Abnormal stress ECG 8/10/2018    CAD (coronary artery disease) 2/16/2011    Cancer ([facility]     Cardiomyopathy ([facility] 2/16/2011    Chronic diastolic HF (heart failure) ([facility] 8/10/2018    Encounter for long-term (current) use of other medications 2/16/2011    Esophageal reflux 2/16/2011    Essential hypertension, benign 2/16/2011    GERD (gastroesophageal reflux disease) 5/14/2012    Heart attack ([facility]     Long term current use of anticoagulant therapy     Low back pain 7/2/2013    Mixed hyperlipidemia 2/16/2011    Nocturia 2/16/2011    Pacemaker     Presence of biventricular AICD 8/10/2018    [employer] BIVICD implanted 5/2017    S/P coronary artery stent placement 8/10/2018    8/9/18 PCI/SUREKHA to LAD, RCA x 2    Systolic CHF, chronic ([facility]) 8/10/2018    VF (ventricular fibrillation) ([facility] 8/10/2018    VT (ventricular tachycardia) ([facility] 8/10/2018      Past Surgical History:   Procedure Laterality Date    HX CERVICAL FUSION  1997    c4,5,6    HX CORONARY STENT PLACEMENT      HX HEART CATHETERIZATION      HX PACEMAKER      HX PTCA      [place] COLONOSCOPY FLX DX W/COLLJ SPEC WHEN PFRMD  5/14/2012         VT EGD TRANSORAL BIOPSY SINGLE/MULTIPLE  5/14/2012         VT REMOVAL GALLBLADDER       Current Outpatient Medications   Medication Sig Dispense Refill    tamsulosin (FLOMAX) 0.4 mg capsule TAKE 1 CAPSULE EVERY DAY 90 Capsule 0    sacubitriL-valsartan (Entresto) 24-26 mg tablet Take 1 Tablet by mouth two (2) times a day. 60 Tablet 11    empagliflozin (Jardiance) 10 mg tablet Take 1 Tablet by mouth daily. Indications: heart failure with reduced ejection fraction 90 Tablet 3    empagliflozin (Jardiance) 10 mg tablet Take 1 Tablet by mouth daily.  Indications: heart failure with reduced ejection fraction 14 Tablet 0    ezetimibe (ZETIA) 10 mg tablet TAKE 1 TABLET EVERY DAY TO LOWER CHOLESTEROL 90 Tablet 1    amiodarone (CORDARONE) 200 mg tablet TAKE 1 TABLET EVERY DAY FOR HEART RYTHYM (Patient taking differently: Take 100 mg by mouth daily.) 90 Tablet 1    clobetasoL (TEMOVATE) 0.05 % ointment       rosuvastatin (CRESTOR) 20 mg tablet TAKE 1 TABLET BY MOUTH DAILY FOR CHOLESTEROL BEST IN THE EVENING 90 Tablet 2    carvediloL (COREG) 25 mg tablet TAKE 1 TABLET BY MOUTH TWO (2) TIMES DAILY (WITH MEALS). 180 Tablet 3    Eliquis 5 mg tablet TAKE 1 TABLET BY MOUTH TWO (2) TIMES A DAY. FOR BLOOD THINNER 180 Tab 3    aspirin delayed-release 81 mg tablet Take  by mouth daily. nitroglycerin (NITROLINGUAL) 400 mcg/spray spray 1 Spray by SubLINGual route every five (5) minutes as needed. 1 Bottle 1    cyanocobalamin 1,000 mcg tablet Take 1,000 mcg by mouth daily. omega-3-dha-epa-dpa-fish oil 1,050-1,200 mg cap Take 1 Cap by mouth two (2) times a day. cholecalciferol (VITAMIN D3) (1000 Units /25 mcg) tablet Take  by mouth daily. multivitamins-minerals-lutein (MEN'S CENTRUM SILVER WITH LUTEIN) tab tablet Take 1 Tab by mouth daily.        No Known Allergies    Family History   Problem Relation Age of Onset    Lung Disease Mother     Cancer Mother     Alcohol abuse Father     Heart Disease Father      Social History     Tobacco Use    Smoking status: Never    Smokeless tobacco: Never   Substance Use Topics    Alcohol use: No         Audrey Evans MD

## 2023-04-08 NOTE — LETTER
NOTIFICATION OF RETURN TO WORK / SCHOOL 
 
7/8/2019 3:14 PM 
 
Mr. Khadar Jennings 
[address] [facility] 
[address] [address] [place] To Whom It May Concern: Khadar Jennings was under the care of [facility] from 7/8/19 He will be able to return to work/school on 7/15/19 with no restrictions. If there are questions or concerns please have the patient contact our office. Sincerely, Charmayne Faes, MD 
 Patient

## 2023-07-15 RX ORDER — EMPAGLIFLOZIN 10 MG/1
TABLET, FILM COATED ORAL
Qty: 90 TABLET | Refills: 2 | OUTPATIENT
Start: 2023-07-15

## 2023-09-26 ENCOUNTER — HOSPITAL ENCOUNTER (OUTPATIENT)
Facility: HOSPITAL | Age: 73
Discharge: HOME | End: 2023-09-29
Payer: MEDICARE

## 2023-09-26 ENCOUNTER — TRANSCRIBE ORDERS (OUTPATIENT)
Facility: HOSPITAL | Age: 73
End: 2023-09-26

## 2023-09-26 DIAGNOSIS — Z76.89 REFERRAL OF PATIENT WITHOUT EXAMINATION OR TREATMENT: ICD-10-CM

## 2023-09-26 DIAGNOSIS — Z76.89 REFERRAL OF PATIENT WITHOUT EXAMINATION OR TREATMENT: Primary | ICD-10-CM

## 2023-09-26 PROCEDURE — 71046 X-RAY EXAM CHEST 2 VIEWS: CPT

## 2023-11-16 RX ORDER — TAMSULOSIN HYDROCHLORIDE 0.4 MG/1
0.4 CAPSULE ORAL DAILY
Qty: 90 CAPSULE | Refills: 0 | Status: SHIPPED | OUTPATIENT
Start: 2023-11-16

## 2023-11-16 NOTE — TELEPHONE ENCOUNTER
Last appointment: 10/12/22  Next appointment: none  Previous refill encounter(s): 11/11/22    Requested Prescriptions     Pending Prescriptions Disp Refills    tamsulosin (FLOMAX) 0.4 MG capsule [Pharmacy Med Name: *TAMSULOSIN 0.4MG] 90 capsule 0     Sig: Take 1 capsule by mouth daily Patient needs an appointment for further refills         For Pharmacy Admin Tracking Only    Program: Medication Refill  CPA in place:    Recommendation Provided To:    Intervention Detail: New Rx: 1, reason: Patient Preference  Intervention Accepted By:   Merlin Huddleston?:    Time Spent (min): 5

## 2024-01-30 ENCOUNTER — TELEPHONE (OUTPATIENT)
Age: 74
End: 2024-01-30

## 2024-01-30 DIAGNOSIS — J20.9 ACUTE BRONCHITIS, UNSPECIFIED ORGANISM: Primary | ICD-10-CM

## 2024-01-30 RX ORDER — AZITHROMYCIN 250 MG/1
250 TABLET, FILM COATED ORAL SEE ADMIN INSTRUCTIONS
Qty: 6 TABLET | Refills: 0 | Status: SHIPPED | OUTPATIENT
Start: 2024-01-30 | End: 2024-02-04

## 2024-01-30 NOTE — TELEPHONE ENCOUNTER
Patient requesting a prescription for Z- pack be sent to [facility] for a cough that started this past Saturday. COVID test negative on Monday Patient has been taking Mucinex  and Tylenol.

## 2024-01-31 RX ORDER — AZITHROMYCIN 250 MG/1
250 TABLET, FILM COATED ORAL SEE ADMIN INSTRUCTIONS
Qty: 6 TABLET | Refills: 0 | OUTPATIENT
Start: 2024-01-31 | End: 2024-02-05

## 2024-02-14 RX ORDER — TAMSULOSIN HYDROCHLORIDE 0.4 MG/1
0.4 CAPSULE ORAL DAILY
Qty: 90 CAPSULE | Refills: 0 | Status: SHIPPED | OUTPATIENT
Start: 2024-02-14

## 2024-02-14 NOTE — TELEPHONE ENCOUNTER
Last appointment: 10/12/22  Next appointment: none  Previous refill encounter(s): 11/16/23 #90    Requested Prescriptions     Pending Prescriptions Disp Refills    tamsulosin (FLOMAX) 0.4 MG capsule [Pharmacy Med Name: TAMSULOSIN 0.4MG] 30 capsule 0     Sig: Take 1 capsule by mouth daily Patient needs an appointment for further refills         For Pharmacy Admin Tracking Only    Program: Medication Refill  CPA in place:    Recommendation Provided To:   Intervention Detail: New Rx: 1, reason: Patient Preference and Scheduled Appointment  Intervention Accepted By:   Gap Closed?:    Time Spent (min): 5

## 2024-05-13 RX ORDER — TAMSULOSIN HYDROCHLORIDE 0.4 MG/1
0.4 CAPSULE ORAL DAILY
Qty: 30 CAPSULE | Refills: 0 | Status: SHIPPED | OUTPATIENT
Start: 2024-05-13

## 2024-05-13 NOTE — TELEPHONE ENCOUNTER
Last appointment: 10/12/22  Next appointment: none  Previous refill encounter(s): 2/14/24 #90    Requested Prescriptions     Pending Prescriptions Disp Refills    tamsulosin (FLOMAX) 0.4 MG capsule [Pharmacy Med Name: TAMSULOSIN 0.4MG] 30 capsule 0     Sig: TAKE 1 CAPSULE BY MOUTH DAILY PATIENT NEEDS AN APPOINTMENT FOR FURTHER REFILLS         For Pharmacy Admin Tracking Only    Program: Medication Refill  CPA in place:    Recommendation Provided To:   Intervention Detail: New Rx: 1, reason: Patient Preference  Intervention Accepted By:   Gap Closed?:    Time Spent (min): 5

## 2024-06-12 RX ORDER — TAMSULOSIN HYDROCHLORIDE 0.4 MG/1
0.4 CAPSULE ORAL DAILY
Qty: 30 CAPSULE | Refills: 0 | Status: SHIPPED | OUTPATIENT
Start: 2024-06-12

## 2024-06-12 NOTE — TELEPHONE ENCOUNTER
Patient not seen since 2022    Last appointment: 10/12/22  Next appointment: none  Previous refill encounter(s): 5/13/24    Requested Prescriptions     Pending Prescriptions Disp Refills    tamsulosin (FLOMAX) 0.4 MG capsule [Pharmacy Med Name: TAMSULOSIN 0.4MG] 30 capsule 0     Sig: TAKE 1 CAPSULE BY MOUTH DAILY PATIENT NEEDS AN APPOINTMENT FOR FURTHER REFILLS         For Pharmacy Admin Tracking Only    Program: Medication Refill  CPA in place:    Recommendation Provided To:   Intervention Detail: New Rx: 1, reason: Patient Preference and Scheduled Appointment  Intervention Accepted By:   Gap Closed?:    Time Spent (min): 5

## 2024-08-09 RX ORDER — TAMSULOSIN HYDROCHLORIDE 0.4 MG/1
0.4 CAPSULE ORAL DAILY
Qty: 30 CAPSULE | Refills: 0 | OUTPATIENT
Start: 2024-08-09

## 2024-08-09 NOTE — TELEPHONE ENCOUNTER
Last appt was 2022.    For Pharmacy Admin Tracking Only    Program: Medication Refill  CPA in place:    Recommendation Provided To:   Intervention Detail: Discontinued Rx: 1, reason: Non-adherence  Intervention Accepted By:   Gap Closed?:    Time Spent (min): 5

## 2024-08-13 NOTE — IP AVS SNAPSHOT
Anesthesia Post-op Note    Patient: Connor Albert  Procedure(s) Performed: Colonoscopy   Anesthesia type: MAC    Vitals Value Taken Time   Temp 36 °C (96.8 °F) 08/13/24 0926   Pulse 59 08/13/24 0926   Resp 15 08/13/24 0926   SpO2 95 % 08/13/24 0926   /62 08/13/24 0926         Patient Location: Phase II  Post-op Vital Signs:stable  Level of Consciousness: participates in exam, awake and return to baseline  Respiratory Status: spontaneous ventilation, unassisted and room air  Cardiovascular stable  Hydration: euvolemic  Pain Management: adequately controlled  Handoff: Handoff to receiving clinician was performed and questions were answered  Nausea: None  Airway Patency:patent  Post-op Assessment: awake, alert, appropriately conversant, or baseline, no complications, patient tolerated procedure well, dentition within defined limits, moving all extremities and No Corneal Abrasion  Comments: Tolerated well with +SV, vss.  No airway manipulation.  Dentition within defined limits.  No evidence of aspiration noted, ctab, no wheezing or coughing.        No notable events documented.                       [address] [facility] 
[phone] Patient: Marizol Cohen. MRN: [id number] You are allergic to the following No active allergies Recent Documentation Height Weight BMI Smoking Status 1.727 m 107.5 kg 36.04 kg/m2 Never Smoker Emergency Contacts Name Discharge Info Relation Home Work Mobile Ashlie Godinez DISCHARGE CAREGIVER [3] Spouse [3] [phone] About your hospitalization You were admitted on:  May 15, 2017 You last received care in the:  [facility] ELECTROPHYSIOLOGY You were discharged on:  May 15, 2017 Unit phone number:  [phone] Why you were hospitalized Your primary diagnosis was:  Not on File Providers Seen During Your Hospitalizations Provider Role Specialty Primary office phone Gerri Brantley MD Attending Provider Cardiology [phone] Your Primary Care Physician (PCP) Primary Care Physician Office Phone Office Fax Christiano Sierra [phone] Follow-up Information Follow up With Details Comments Contact Info Jerri Garg MD   [address] [place], [place] [address] [id number] 
[phone] Your Appointments Thursday June 01, 2017  1:30 PM EDT  
PACEMAKER with PACEMAKER, [facility] [address]) [address] [place] [address]  
[phone] Current Discharge Medication List  
  
CONTINUE these medications which have NOT CHANGED Dose & Instructions Dispensing Information Comments Morning Noon Evening Bedtime  
 aspirin 325 mg tablet Commonly known as:  ASPIRIN Your last dose was: Your next dose is:    
   
   
 Dose:  325 mg Take 325 mg by mouth daily. Refills:  0  
     
   
   
   
  
 captopril 12.5 mg tablet Commonly known as:  CAPOTEN Your last dose was: Your next dose is:    
   
   
 Dose:  12.5 mg Take 1 Tab by mouth two (2) times a day. Quantity:  180 Tab Refills:  3  
     
   
   
   
  
 carvedilol 25 mg tablet Commonly known as:  Tyra Bosworth Your last dose was: Your next dose is: TAKE ONE HALF TABLET BY MOUTH TWO (2) TIMES A DAY. NEEDS AN APPOINTMENT Quantity:  30 Tab Refills:  0 Generic For:COREG 25MG Pt has an appt on 5/4/17 CENTRUM SILVER Tab tablet Generic drug:  multivitamins-minerals-lutein Your last dose was: Your next dose is: Take  by mouth. Refills:  0  
     
   
   
   
  
 CRESTOR 20 mg tablet Generic drug:  rosuvastatin Your last dose was: Your next dose is: TAKE 1 TABLET BY MOUTH EVERY DAY Quantity:  90 Tab Refills:  2  
     
   
   
   
  
 cyanocobalamin 1,000 mcg tablet Your last dose was: Your next dose is:    
   
   
 Dose:  1000 mcg Take 1,000 mcg by mouth daily. Refills:  0  
     
   
   
   
  
 furosemide 20 mg tablet Commonly known as:  LASIX Your last dose was: Your next dose is: TAKE 1 TABLET BY MOUTH EVERY DAY Quantity:  30 Tab Refills:  4 Generic For:LASIX 20MG LEVITRA 20 mg tablet Generic drug:  vardenafil Your last dose was: Your next dose is:    
   
   
 Dose:  20 mg Take 20 mg by mouth as needed. Refills:  99  
     
   
   
   
  
 nitroglycerin 400 mcg/spray spray Commonly known as:  Eden Bile Your last dose was: Your next dose is:    
   
   
 Dose:  1 Spray 1 Spray by SubLINGual route every five (5) minutes as needed. Quantity:  1 Bottle Refills:  1  
     
   
   
   
  
 omega-3-dha-epa-dpa-fish oil 1,050-1,200 mg Cap Your last dose was: Your next dose is:    
   
   
 Dose:  1 Cap Take 1 Cap by mouth two (2) times a day. Refills:  0  
     
   
   
   
  
 promethazine-dm Syrp Your last dose was: Your next dose is:    
   
   
 Dose:  5 mL Take 5 mL by mouth four (4) times daily as needed. Quantity:  180 mL Refills:  2  
     
   
   
   
  
 tamsulosin 0.4 mg capsule Commonly known as:  FLOMAX Your last dose was: Your next dose is: TAKE 1 CAPSULE BY MOUTH EVERY DAY Quantity:  30 Cap Refills:  4 Generic For:FLOMAX 0.4MG  
    
   
   
   
  
 VITAMIN D3 1,000 unit tablet Generic drug:  cholecalciferol Your last dose was: Your next dose is: Take  by mouth daily. Refills:  0 ZETIA 10 mg tablet Generic drug:  ezetimibe Your last dose was: Your next dose is: TAKE 1 TABLET BY MOUTH EVERY DAY Quantity:  90 Tab Refills:  2 Discharge Instructions [facility] [address]  [phone] ICD/PACEMAKER DISCHARGE INSTRUCTIONS Patient ID: Tabby Lares 
[id number] 
75 y.o. 
1950 Admit Date: 5/15/2017 Discharge Date: 5/15/2017 Admitting Physician: Pieter Sweeney MD  
 
Discharge Physician: Pieter Sweeney MD 
 
Admission Diagnoses: SSS Discharge Diagnoses: Active Problems: * No active hospital problems. * 
cardiomyopathy 
chf 
lbbb 
sss Discharge Condition: Good Cardiology Procedures this Admission:  upgrade to BiV-ICD Disposition: home Reference discharge instructions provided by nursing for diet and activity. Follow-up with Dr. Mack Kaufman in 2 weeks. Please contact the office for an appointment at [phone]. Signed: 
Pieter Sweeney MD 
5/15/2017 10:45 AM 
 
DISCHARGE INSTRUCTIONS FOR PATIENTS WITH ICD'S AND PACEMAKERS 1. Carry you ID card for your ICD/Pacemaker with you at all times.   This card will be given to you in the hospital or mailed to you. 2. Medic Alert Bracelets are available from your pharmacist to wear at all times. 3. Call for an appointment in 2 weeks [phone]. 4. The pacemaker will bulge slightly under your skin. An ICD bulges a little more because it is larger. The bulge will decrease in size over the next few weeks. Please notify the doctor's office if you notice any of the following around your ICD site: A.  A bruise that does not go away B. Soreness or yellow, green, or brown drainage from the site. C. Any swelling from the site. D. If you have a fever of 100 degrees or higher that lasts for a few days INCISION CARE 1.  Leave dressing over your site until you see the doctor. 2.  Leave steri-strips over your site until they start to fall off.  
3.   You may shower after as long as your incision isnt submerged or directly sprayed upon until well healed. 4.  For comfort, wear loose fitting clothing. 5.  Ice pack to affected shoulder for first 24 hours, wear your sling for 2 days. 6.  Report any signs of infection, fever, pain, swelling, redness, oozing, or heat at site especially if these symptoms increase after the first 3 to 4 days. ACTIVITY PRECAUTIONS 1. Avoid rough contact with the implant site. 2. No driving for 14 days. 3. Avoid lifting your arm over your head, carrying anything on the affected side, or lifting over 10 pounds for 30 days. For the first 2 days only bend your arm at the elbow. 4. Any extreme activity such as golf, weight lifting or exercise biking should be restricted for 60 days. 5. Do not carry objects by holding them against your implant site. 6.  No shooting rifles or any type of gun with the affected shoulder permanently. 7.  If you have an ICD, welding and chainsaws are prohibited. SPECIAL PRECAUTIONS 1.   You should avoid all strong magnetic fields, such as arc welding, large transformers, large motors. Some ICD devices will beep if it detects a strong magnet. If this occurs, move out of the area. 2.  You may not have an MRI. 3.  Treatments or surgery that requires diathermy or electrocautery should be discussed with your doctor before scheduled. 4. Avoid radio frequency transmitters, including radar. 5. Advise dentist or other medical personnel you see that you have a pacemaker or ICD. 6.  Cell phones and microwave oven use is okay. 7.  If you plan to move or take a trip to a new area, the doctor's office will give you a name of a doctor to contact for any problems. SPECIAL INSTRUCTIONS ON SHOCKS 1. Notify your doctor for any of the following: A. Anytime a shock is received in a 24 hour period. An office visit is not usually required for a single shock. B.  Two or more shocks in a row. If you do not feel well, call the Rescue [facility], otherwise call your doctor. This may require an office visit. C. Two or more shocks spaced apart by several hours. This may require an office visit. 2.  Keep a record of events. Include date, time, symptoms and activity at that time. ANTIBIOTIC THERAPY During the first 8 weeks after your pacemaker or ICD insertion, you may need antibiotics before any dental work or certain tests or operations. Let the dentist or doctor who is caring for you know that you have had an implanted device. Discharge Orders None ACO Transitions of Care Introducing [employer] [facility] offers a voluntary care coordination program to provide high quality service and care to Psychiatric fee-for-service beneficiaries. Anum Yusuf was designed to help you enhance your health and well-being through the following services: ? Transitions of Care  support for individuals who are transitioning from one care setting to another (example: Hospital to home). ? Chronic and Complex Care Coordination  support for individuals and caregivers of those with serious or chronic illnesses or with more than one chronic (ongoing) condition and those who take a number of different medications. If you meet specific medical criteria, a [facility] [address] may call you directly to coordinate your care with your primary care physician and your other care providers. For questions about the [facility] programs, please, contact your physicians office. For general questions or additional information about Accountable Care Organizations: 
Please visit www.medicare.gov/acos. html or call 1-800-MEDICARE (3-591.254.6972) TTY users should call 0-632.264.6756. Introducing [facility] & HEALTH SERVICES! [employer] introduces [employer] patient portal. Now you can access parts of your medical record, email your doctor's office, and request medication refills online. 1. In your internet browser, go to [email or web address] 2. Click on the First Time User? Click Here link in the Sign In box. You will see the New Member Sign Up page. 3. Enter your [employer] Access Code exactly as it appears below. You will not need to use this code after youve completed the sign-up process. If you do not sign up before the expiration date, you must request a new code. · [employer] Access Code: [email or web address] Expires: 8/2/2017 11:21 AM 
 
4. Enter the last four digits of your Social Security Number (xxxx) and Date of Birth (mm/dd/yyyy) as indicated and click Submit. You will be taken to the next sign-up page. 5. Create a [employer] ID. This will be your [employer] login ID and cannot be changed, so think of one that is secure and easy to remember. 6. Create a [employer] password. You can change your password at any time. 7. Enter your Password Reset Question and Answer. This can be used at a later time if you forget your password. 8. Enter your e-mail address. You will receive e-mail notification when new information is available in [address] 9. Click Sign Up. You can now view and download portions of your medical record. 10. Click the Download Summary menu link to download a portable copy of your medical information. If you have questions, please visit the Frequently Asked Questions section of the [employer] website. Remember, [employer] is NOT to be used for urgent needs. For medical emergencies, dial 911. Now available from your iPhone and Android! General Information Please provide this summary of care documentation to your next provider. Patient Signature:  ____________________________________________________________ Date:  ____________________________________________________________  
  
[facility] Provider Signature:  ____________________________________________________________ Date:  ____________________________________________________________

## 2024-08-27 RX ORDER — TAMSULOSIN HYDROCHLORIDE 0.4 MG/1
0.4 CAPSULE ORAL DAILY
Qty: 30 CAPSULE | Refills: 0 | Status: SHIPPED | OUTPATIENT
Start: 2024-08-27

## 2024-08-27 NOTE — TELEPHONE ENCOUNTER
Last appointment: 10/12/22  Next appointment: none  Previous refill encounter(s): 6/12/24 #30    Requested Prescriptions     Pending Prescriptions Disp Refills    tamsulosin (FLOMAX) 0.4 MG capsule [Pharmacy Med Name: TAMSULOSIN 0.4MG] 30 capsule 0     Sig: TAKE 1 CAPSULE BY MOUTH DAILY PATIENT NEEDS AN APPOINTMENT FOR FURTHER REFILLS         For Pharmacy Admin Tracking Only    Program: Medication Refill  CPA in place:    Recommendation Provided To:   Intervention Detail: New Rx: 1, reason: Patient Preference  Intervention Accepted By:   Gap Closed?:    Time Spent (min): 5

## 2024-11-07 RX ORDER — TAMSULOSIN HYDROCHLORIDE 0.4 MG/1
0.4 CAPSULE ORAL DAILY
Qty: 30 CAPSULE | Refills: 1 | Status: SHIPPED | OUTPATIENT
Start: 2024-11-07

## 2024-11-07 NOTE — TELEPHONE ENCOUNTER
Last appointment: 10/12/22  Next appointment: 12/30/24  Previous refill encounter(s): 8/27/24 #30    Requested Prescriptions     Pending Prescriptions Disp Refills    tamsulosin (FLOMAX) 0.4 MG capsule [Pharmacy Med Name: TAMSULOSIN 0.4MG] 30 capsule 1     Sig: Take 1 capsule by mouth daily         For Pharmacy Admin Tracking Only    Program: Medication Refill  CPA in place:    Recommendation Provided To:   Intervention Detail: New Rx: 1, reason: Patient Preference  Intervention Accepted By:   Gap Closed?:    Time Spent (min): 5

## 2024-12-30 ENCOUNTER — OFFICE VISIT (OUTPATIENT)
Age: 74
End: 2024-12-30
Payer: MEDICARE

## 2024-12-30 VITALS
OXYGEN SATURATION: 96 % | HEART RATE: 71 BPM | DIASTOLIC BLOOD PRESSURE: 78 MMHG | WEIGHT: 245.8 LBS | TEMPERATURE: 98.2 F | BODY MASS INDEX: 37.25 KG/M2 | SYSTOLIC BLOOD PRESSURE: 121 MMHG | RESPIRATION RATE: 18 BRPM | HEIGHT: 68 IN

## 2024-12-30 DIAGNOSIS — Z95.810 AUTOMATIC IMPLANTABLE CARDIOVERTER-DEFIBRILLATOR IN SITU: ICD-10-CM

## 2024-12-30 DIAGNOSIS — I42.9 CARDIOMYOPATHY, UNSPECIFIED TYPE (HCC): ICD-10-CM

## 2024-12-30 DIAGNOSIS — I10 ESSENTIAL HYPERTENSION, BENIGN: ICD-10-CM

## 2024-12-30 DIAGNOSIS — E78.2 MIXED HYPERLIPIDEMIA: ICD-10-CM

## 2024-12-30 DIAGNOSIS — Z00.00 MEDICARE ANNUAL WELLNESS VISIT, SUBSEQUENT: Primary | ICD-10-CM

## 2024-12-30 DIAGNOSIS — I50.22 SYSTOLIC CHF, CHRONIC (HCC): ICD-10-CM

## 2024-12-30 DIAGNOSIS — Z12.5 SCREENING FOR PROSTATE CANCER: ICD-10-CM

## 2024-12-30 DIAGNOSIS — I48.0 PAROXYSMAL ATRIAL FIBRILLATION (HCC): ICD-10-CM

## 2024-12-30 DIAGNOSIS — Z12.11 SCREEN FOR COLON CANCER: ICD-10-CM

## 2024-12-30 LAB
BILIRUBIN, URINE, POC: NEGATIVE
BLOOD URINE, POC: NORMAL
GLUCOSE URINE, POC: NORMAL
KETONES, URINE, POC: NEGATIVE
LEUKOCYTE ESTERASE, URINE, POC: NEGATIVE
NITRITE, URINE, POC: NEGATIVE
PH, URINE, POC: 6 (ref 4.6–8)
PROTEIN,URINE, POC: NEGATIVE
SPECIFIC GRAVITY, URINE, POC: 1.02 (ref 1–1.03)
URINALYSIS CLARITY, POC: CLEAR
URINALYSIS COLOR, POC: YELLOW
UROBILINOGEN, POC: NORMAL MG/DL

## 2024-12-30 PROCEDURE — 99213 OFFICE O/P EST LOW 20 MIN: CPT | Performed by: FAMILY MEDICINE

## 2024-12-30 PROCEDURE — 1123F ACP DISCUSS/DSCN MKR DOCD: CPT | Performed by: FAMILY MEDICINE

## 2024-12-30 PROCEDURE — G8417 CALC BMI ABV UP PARAM F/U: HCPCS | Performed by: FAMILY MEDICINE

## 2024-12-30 PROCEDURE — G8484 FLU IMMUNIZE NO ADMIN: HCPCS | Performed by: FAMILY MEDICINE

## 2024-12-30 PROCEDURE — PBSHW AMB POC URINALYSIS DIP STICK MANUAL W/O MICRO: Performed by: FAMILY MEDICINE

## 2024-12-30 PROCEDURE — 1126F AMNT PAIN NOTED NONE PRSNT: CPT | Performed by: FAMILY MEDICINE

## 2024-12-30 PROCEDURE — 3078F DIAST BP <80 MM HG: CPT | Performed by: FAMILY MEDICINE

## 2024-12-30 PROCEDURE — 3017F COLORECTAL CA SCREEN DOC REV: CPT | Performed by: FAMILY MEDICINE

## 2024-12-30 PROCEDURE — G0439 PPPS, SUBSEQ VISIT: HCPCS | Performed by: FAMILY MEDICINE

## 2024-12-30 PROCEDURE — 81002 URINALYSIS NONAUTO W/O SCOPE: CPT | Performed by: FAMILY MEDICINE

## 2024-12-30 PROCEDURE — G8427 DOCREV CUR MEDS BY ELIG CLIN: HCPCS | Performed by: FAMILY MEDICINE

## 2024-12-30 PROCEDURE — 1159F MED LIST DOCD IN RCRD: CPT | Performed by: FAMILY MEDICINE

## 2024-12-30 PROCEDURE — 1036F TOBACCO NON-USER: CPT | Performed by: FAMILY MEDICINE

## 2024-12-30 PROCEDURE — 3074F SYST BP LT 130 MM HG: CPT | Performed by: FAMILY MEDICINE

## 2024-12-30 RX ORDER — CARVEDILOL 12.5 MG/1
TABLET ORAL
COMMUNITY
Start: 2024-12-13

## 2024-12-30 SDOH — ECONOMIC STABILITY: INCOME INSECURITY: HOW HARD IS IT FOR YOU TO PAY FOR THE VERY BASICS LIKE FOOD, HOUSING, MEDICAL CARE, AND HEATING?: NOT HARD AT ALL

## 2024-12-30 SDOH — ECONOMIC STABILITY: FOOD INSECURITY: WITHIN THE PAST 12 MONTHS, YOU WORRIED THAT YOUR FOOD WOULD RUN OUT BEFORE YOU GOT MONEY TO BUY MORE.: NEVER TRUE

## 2024-12-30 SDOH — ECONOMIC STABILITY: FOOD INSECURITY: WITHIN THE PAST 12 MONTHS, THE FOOD YOU BOUGHT JUST DIDN'T LAST AND YOU DIDN'T HAVE MONEY TO GET MORE.: NEVER TRUE

## 2024-12-30 ASSESSMENT — PATIENT HEALTH QUESTIONNAIRE - PHQ9
SUM OF ALL RESPONSES TO PHQ QUESTIONS 1-9: 0
SUM OF ALL RESPONSES TO PHQ9 QUESTIONS 1 & 2: 0
SUM OF ALL RESPONSES TO PHQ QUESTIONS 1-9: 0
1. LITTLE INTEREST OR PLEASURE IN DOING THINGS: NOT AT ALL
2. FEELING DOWN, DEPRESSED OR HOPELESS: NOT AT ALL
SUM OF ALL RESPONSES TO PHQ QUESTIONS 1-9: 0
SUM OF ALL RESPONSES TO PHQ QUESTIONS 1-9: 0

## 2024-12-30 ASSESSMENT — LIFESTYLE VARIABLES
HOW OFTEN DO YOU HAVE A DRINK CONTAINING ALCOHOL: NEVER
HOW MANY STANDARD DRINKS CONTAINING ALCOHOL DO YOU HAVE ON A TYPICAL DAY: PATIENT DOES NOT DRINK

## 2024-12-30 ASSESSMENT — ENCOUNTER SYMPTOMS
WHEEZING: 0
CONSTIPATION: 0
ABDOMINAL DISTENTION: 0

## 2024-12-30 NOTE — PROGRESS NOTES
Chief Complaint   Patient presents with    Medicare AWV     Patient is here today for his medicare annual wellness exam.      \"Have you been to the ER, urgent care clinic since your last visit?  Hospitalized since your last visit?\"    NO    “Have you seen or consulted any other health care providers outside of [facility] since your last visit?”    [facility]        “Have you had a colorectal cancer screening such as a colonoscopy/FIT/Cologuard?    NO    Date of last Colonoscopy: 5/14/2012  No cologuard on file  No FIT/FOBT on file   No flexible sigmoidoscopy on file         Click Here for Release of Records Request

## 2024-12-30 NOTE — PATIENT INSTRUCTIONS
Learning About Being Active as an Older Adult  Why is being active important as you get older?     Being active is one of the best things you can do for your health. And it's never too late to start. Being active--or getting active, if you aren't already--has definite benefits. It can:  Give you more energy,  Keep your mind sharp.  Improve balance to reduce your risk of falls.  Help you manage chronic illness with fewer medicines.  No matter how old you are, how fit you are, or what health problems you have, there is a form of activity that will work for you. And the more physical activity you can do, the better your overall health will be.  What kinds of activity can help you stay healthy?  Being more active will make your daily activities easier. Physical activity includes planned exercise and things you do in daily life. There are four types of activity:  Aerobic.  Doing aerobic activity makes your heart and lungs strong.  Includes walking, dancing, and gardening.  Aim for at least 2½ hours spread throughout the week.  It improves your energy and can help you sleep better.  Muscle-strengthening.  This type of activity can help maintain muscle and strengthen bones.  Includes climbing stairs, using resistance bands, and lifting or carrying heavy loads.  Aim for at least twice a week.  It can help protect the knees and other joints.  Stretching.  Stretching gives you better range of motion in joints and muscles.  Includes upper arm stretches, calf stretches, and gentle yoga.  Aim for at least twice a week, preferably after your muscles are warmed up from other activities.  It can help you function better in daily life.  Balancing.  This helps you stay coordinated and have good posture.  Includes heel-to-toe walking, carmen chi, and certain types of yoga.  Aim for at least 3 days a week.  It can reduce your risk of falling.  Even if you have a hard time meeting the recommendations, it's better to be more active

## 2024-12-30 NOTE — PROGRESS NOTES
NAME:  Tyrese Arnett Jr   :   1950   MRN:   [id number]     Date/Time:  2024 7:20 PM  Subjective:for MWV,F/U CM c EF 25%,PAF,A/C,HBP.Feeling pretty well,though mildly increased SEVILLA.      Follow-up  The history is provided by the patient. This is a chronic problem. The problem has not changed since onset.Pertinent negatives include no chest pain.   Hypertension   The history is provided by the patient. This is a chronic problem. The problem has not changed since onset.Pertinent negatives include no chest pain, no malaise/fatigue, no blurred vision, no peripheral edema and no dizziness.   Breathing Problem  The history is provided by the patient. This is a chronic problem. The problem has been gradually improving. Pertinent negatives include no chest pain and no leg swelling.     Review of Systems   Constitutional:  Positive for fatigue.   HENT:  Negative for congestion.    Respiratory:  Negative for shortness of breath and wheezing.    Cardiovascular:  Negative for palpitations.   Gastrointestinal:  Negative for abdominal distention and constipation.   Genitourinary:  Negative for difficulty urinating.   Musculoskeletal:  Negative for arthralgias and gait problem.             Medications reviewed:  Current Outpatient Medications   Medication Sig    tamsulosin (FLOMAX) 0.4 MG capsule Take 1 capsule by mouth daily    amiodarone (CORDARONE) 200 MG tablet TAKE 1 TABLET EVERY DAY FOR HEART RYTHYM    apixaban (ELIQUIS) 5 MG TABS tablet TAKE 1 TABLET BY MOUTH TWO (2) TIMES A DAY. FOR BLOOD THINNER    aspirin 81 MG EC tablet Take by mouth daily    carvedilol (COREG) 25 MG tablet TAKE 1 TABLET BY MOUTH TWO (2) TIMES DAILY (WITH MEALS).    vitamin D (CHOLECALCIFEROL) 25 MCG (1000 UT) TABS tablet Take by mouth daily    clobetasol (TEMOVATE) 0.05 % ointment ceived the following from [facility]: Outside name: clobetasoL (TEMOVATE) 0.05 % ointment    cyanocobalamin 1000 MCG tablet Take by mouth

## 2024-12-31 LAB
ALBUMIN SERPL-MCNC: 3.9 G/DL (ref 3.5–5)
ALBUMIN/GLOB SERPL: 1.6 (ref 1.1–2.2)
ALP SERPL-CCNC: 51 U/L (ref 45–117)
ALT SERPL-CCNC: 26 U/L (ref 12–78)
ANION GAP SERPL CALC-SCNC: 8 MMOL/L (ref 2–12)
AST SERPL-CCNC: 21 U/L (ref 15–37)
BASOPHILS # BLD: 0.1 K/UL (ref 0–0.1)
BASOPHILS NFR BLD: 1 % (ref 0–1)
BILIRUB SERPL-MCNC: 1 MG/DL (ref 0.2–1)
BUN SERPL-MCNC: 16 MG/DL (ref 6–20)
BUN/CREAT SERPL: 11 (ref 12–20)
CALCIUM SERPL-MCNC: 9.2 MG/DL (ref 8.5–10.1)
CHLORIDE SERPL-SCNC: 109 MMOL/L (ref 97–108)
CHOLEST SERPL-MCNC: 127 MG/DL
CO2 SERPL-SCNC: 23 MMOL/L (ref 21–32)
CREAT SERPL-MCNC: 1.51 MG/DL (ref 0.7–1.3)
DIFFERENTIAL METHOD BLD: ABNORMAL
EOSINOPHIL # BLD: 0.2 K/UL (ref 0–0.4)
EOSINOPHIL NFR BLD: 5 % (ref 0–7)
ERYTHROCYTE [DISTWIDTH] IN BLOOD BY AUTOMATED COUNT: 13.1 % (ref 11.5–14.5)
GLOBULIN SER CALC-MCNC: 2.4 G/DL (ref 2–4)
GLUCOSE SERPL-MCNC: 97 MG/DL (ref 65–100)
HCT VFR BLD AUTO: 45 % (ref 36.6–50.3)
HDLC SERPL-MCNC: 53 MG/DL
HDLC SERPL: 2.4 (ref 0–5)
HGB BLD-MCNC: 15 G/DL (ref 12.1–17)
IMM GRANULOCYTES # BLD AUTO: 0 K/UL (ref 0–0.04)
IMM GRANULOCYTES NFR BLD AUTO: 0 % (ref 0–0.5)
LDLC SERPL CALC-MCNC: 55.2 MG/DL (ref 0–100)
LYMPHOCYTES # BLD: 1.4 K/UL (ref 0.8–3.5)
LYMPHOCYTES NFR BLD: 30 % (ref 12–49)
MCH RBC QN AUTO: 32.1 PG (ref 26–34)
MCHC RBC AUTO-ENTMCNC: 33.3 G/DL (ref 30–36.5)
MCV RBC AUTO: 96.4 FL (ref 80–99)
MONOCYTES # BLD: 0.5 K/UL (ref 0–1)
MONOCYTES NFR BLD: 10 % (ref 5–13)
NEUTS SEG # BLD: 2.7 K/UL (ref 1.8–8)
NEUTS SEG NFR BLD: 54 % (ref 32–75)
NRBC # BLD: 0 K/UL (ref 0–0.01)
NRBC BLD-RTO: 0 PER 100 WBC
PLATELET # BLD AUTO: 142 K/UL (ref 150–400)
PMV BLD AUTO: 10.7 FL (ref 8.9–12.9)
POTASSIUM SERPL-SCNC: 4.2 MMOL/L (ref 3.5–5.1)
PROT SERPL-MCNC: 6.3 G/DL (ref 6.4–8.2)
PSA SERPL-MCNC: 5.5 NG/ML (ref 0.01–4)
RBC # BLD AUTO: 4.67 M/UL (ref 4.1–5.7)
SODIUM SERPL-SCNC: 140 MMOL/L (ref 136–145)
TRIGL SERPL-MCNC: 94 MG/DL
VLDLC SERPL CALC-MCNC: 18.8 MG/DL
WBC # BLD AUTO: 4.9 K/UL (ref 4.1–11.1)

## 2025-01-20 RX ORDER — TAMSULOSIN HYDROCHLORIDE 0.4 MG/1
0.4 CAPSULE ORAL DAILY
Qty: 30 CAPSULE | Refills: 1 | Status: SHIPPED | OUTPATIENT
Start: 2025-01-20

## 2025-01-21 ENCOUNTER — APPOINTMENT (OUTPATIENT)
Facility: HOSPITAL | Age: 75
DRG: 324 | End: 2025-01-21
Payer: MEDICARE

## 2025-01-21 ENCOUNTER — HOSPITAL ENCOUNTER (INPATIENT)
Facility: HOSPITAL | Age: 75
LOS: 3 days | Discharge: HOME | DRG: 324 | End: 2025-01-24
Attending: STUDENT IN AN ORGANIZED HEALTH CARE EDUCATION/TRAINING PROGRAM | Admitting: INTERNAL MEDICINE
Payer: MEDICARE

## 2025-01-21 DIAGNOSIS — L02.416 CELLULITIS AND ABSCESS OF LEFT LOWER EXTREMITY: Primary | ICD-10-CM

## 2025-01-21 DIAGNOSIS — Z45.02 ICD (IMPLANTABLE CARDIOVERTER-DEFIBRILLATOR) DISCHARGE: ICD-10-CM

## 2025-01-21 DIAGNOSIS — I47.20 VENTRICULAR TACHYCARDIA (HCC): ICD-10-CM

## 2025-01-21 DIAGNOSIS — R07.9 CHEST PAIN: ICD-10-CM

## 2025-01-21 DIAGNOSIS — I25.10 CORONARY ARTERY DISEASE INVOLVING NATIVE CORONARY ARTERY OF NATIVE HEART WITHOUT ANGINA PECTORIS: ICD-10-CM

## 2025-01-21 DIAGNOSIS — L03.116 CELLULITIS AND ABSCESS OF LEFT LOWER EXTREMITY: Primary | ICD-10-CM

## 2025-01-21 PROBLEM — I49.9 ARRHYTHMIA: Status: ACTIVE | Noted: 2025-01-21

## 2025-01-21 LAB
ANION GAP SERPL CALC-SCNC: 7 MMOL/L (ref 2–12)
ANION GAP SERPL CALC-SCNC: 8 MMOL/L (ref 2–12)
BASOPHILS # BLD: 0.04 K/UL (ref 0–0.1)
BASOPHILS NFR BLD: 0.7 % (ref 0–1)
BUN SERPL-MCNC: 18 MG/DL (ref 6–20)
BUN SERPL-MCNC: 20 MG/DL (ref 6–20)
BUN/CREAT SERPL: 11 (ref 12–20)
BUN/CREAT SERPL: 11 (ref 12–20)
CALCIUM SERPL-MCNC: 8.6 MG/DL (ref 8.5–10.1)
CALCIUM SERPL-MCNC: 9.3 MG/DL (ref 8.5–10.1)
CHLORIDE SERPL-SCNC: 107 MMOL/L (ref 97–108)
CHLORIDE SERPL-SCNC: 108 MMOL/L (ref 97–108)
CO2 SERPL-SCNC: 24 MMOL/L (ref 21–32)
CO2 SERPL-SCNC: 24 MMOL/L (ref 21–32)
COMMENT:: NORMAL
CREAT SERPL-MCNC: 1.57 MG/DL (ref 0.7–1.3)
CREAT SERPL-MCNC: 1.85 MG/DL (ref 0.7–1.3)
DIFFERENTIAL METHOD BLD: ABNORMAL
EOSINOPHIL # BLD: 0.14 K/UL (ref 0–0.4)
EOSINOPHIL NFR BLD: 2.3 % (ref 0–7)
ERYTHROCYTE [DISTWIDTH] IN BLOOD BY AUTOMATED COUNT: 13.1 % (ref 11.5–14.5)
GLUCOSE SERPL-MCNC: 122 MG/DL (ref 65–100)
GLUCOSE SERPL-MCNC: 98 MG/DL (ref 65–100)
HCT VFR BLD AUTO: 43.1 % (ref 36.6–50.3)
HGB BLD-MCNC: 14.3 G/DL (ref 12.1–17)
IMM GRANULOCYTES # BLD AUTO: 0.02 K/UL (ref 0–0.04)
IMM GRANULOCYTES NFR BLD AUTO: 0.3 % (ref 0–0.5)
LYMPHOCYTES # BLD: 0.74 K/UL (ref 0.8–3.5)
LYMPHOCYTES NFR BLD: 12.4 % (ref 12–49)
MAGNESIUM SERPL-MCNC: 2.1 MG/DL (ref 1.6–2.4)
MCH RBC QN AUTO: 31.6 PG (ref 26–34)
MCHC RBC AUTO-ENTMCNC: 33.2 G/DL (ref 30–36.5)
MCV RBC AUTO: 95.1 FL (ref 80–99)
MONOCYTES # BLD: 0.48 K/UL (ref 0–1)
MONOCYTES NFR BLD: 8 % (ref 5–13)
NEUTS SEG # BLD: 4.58 K/UL (ref 1.8–8)
NEUTS SEG NFR BLD: 76.3 % (ref 32–75)
NRBC # BLD: 0 K/UL (ref 0–0.01)
NRBC BLD-RTO: 0 PER 100 WBC
PLATELET # BLD AUTO: 169 K/UL (ref 150–400)
PMV BLD AUTO: 9.9 FL (ref 8.9–12.9)
POTASSIUM SERPL-SCNC: 4.1 MMOL/L (ref 3.5–5.1)
POTASSIUM SERPL-SCNC: 4.3 MMOL/L (ref 3.5–5.1)
RBC # BLD AUTO: 4.53 M/UL (ref 4.1–5.7)
RBC MORPH BLD: ABNORMAL
SODIUM SERPL-SCNC: 139 MMOL/L (ref 136–145)
SODIUM SERPL-SCNC: 139 MMOL/L (ref 136–145)
SPECIMEN HOLD: NORMAL
TROPONIN I SERPL HS-MCNC: 12 NG/L (ref 0–76)
TROPONIN I SERPL HS-MCNC: 21 NG/L (ref 0–76)
TROPONIN I SERPL HS-MCNC: 21 NG/L (ref 0–76)
WBC # BLD AUTO: 6 K/UL (ref 4.1–11.1)

## 2025-01-21 PROCEDURE — 97165 OT EVAL LOW COMPLEX 30 MIN: CPT | Performed by: OCCUPATIONAL THERAPIST

## 2025-01-21 PROCEDURE — 97535 SELF CARE MNGMENT TRAINING: CPT | Performed by: OCCUPATIONAL THERAPIST

## 2025-01-21 PROCEDURE — 84484 ASSAY OF TROPONIN QUANT: CPT

## 2025-01-21 PROCEDURE — 6370000000 HC RX 637 (ALT 250 FOR IP): Performed by: INTERNAL MEDICINE

## 2025-01-21 PROCEDURE — 2580000003 HC RX 258: Performed by: INTERNAL MEDICINE

## 2025-01-21 PROCEDURE — 1100000003 HC PRIVATE W/ TELEMETRY

## 2025-01-21 PROCEDURE — 93005 ELECTROCARDIOGRAM TRACING: CPT | Performed by: INTERNAL MEDICINE

## 2025-01-21 PROCEDURE — 99285 EMERGENCY DEPT VISIT HI MDM: CPT

## 2025-01-21 PROCEDURE — 36415 COLL VENOUS BLD VENIPUNCTURE: CPT

## 2025-01-21 PROCEDURE — 71045 X-RAY EXAM CHEST 1 VIEW: CPT

## 2025-01-21 PROCEDURE — 85025 COMPLETE CBC W/AUTO DIFF WBC: CPT

## 2025-01-21 PROCEDURE — 80048 BASIC METABOLIC PNL TOTAL CA: CPT

## 2025-01-21 PROCEDURE — 73560 X-RAY EXAM OF KNEE 1 OR 2: CPT

## 2025-01-21 PROCEDURE — 83735 ASSAY OF MAGNESIUM: CPT

## 2025-01-21 RX ORDER — MAGNESIUM SULFATE IN WATER 40 MG/ML
2000 INJECTION, SOLUTION INTRAVENOUS PRN
Status: DISCONTINUED | OUTPATIENT
Start: 2025-01-21 | End: 2025-01-24 | Stop reason: HOSPADM

## 2025-01-21 RX ORDER — ACETAMINOPHEN 325 MG/1
650 TABLET ORAL EVERY 4 HOURS PRN
Status: DISCONTINUED | OUTPATIENT
Start: 2025-01-21 | End: 2025-01-21 | Stop reason: SDUPTHER

## 2025-01-21 RX ORDER — ONDANSETRON 4 MG/1
4 TABLET, ORALLY DISINTEGRATING ORAL EVERY 8 HOURS PRN
Status: DISCONTINUED | OUTPATIENT
Start: 2025-01-21 | End: 2025-01-24 | Stop reason: HOSPADM

## 2025-01-21 RX ORDER — AMIODARONE HYDROCHLORIDE 200 MG/1
200 TABLET ORAL 2 TIMES DAILY
Status: DISCONTINUED | OUTPATIENT
Start: 2025-01-21 | End: 2025-01-24 | Stop reason: HOSPADM

## 2025-01-21 RX ORDER — SODIUM CHLORIDE, SODIUM LACTATE, POTASSIUM CHLORIDE, CALCIUM CHLORIDE 600; 310; 30; 20 MG/100ML; MG/100ML; MG/100ML; MG/100ML
INJECTION, SOLUTION INTRAVENOUS CONTINUOUS
Status: DISCONTINUED | OUTPATIENT
Start: 2025-01-21 | End: 2025-01-22

## 2025-01-21 RX ORDER — EZETIMIBE 10 MG/1
10 TABLET ORAL NIGHTLY
Status: DISCONTINUED | OUTPATIENT
Start: 2025-01-21 | End: 2025-01-24 | Stop reason: HOSPADM

## 2025-01-21 RX ORDER — ACETAMINOPHEN 650 MG/1
650 SUPPOSITORY RECTAL EVERY 6 HOURS PRN
Status: DISCONTINUED | OUTPATIENT
Start: 2025-01-21 | End: 2025-01-24 | Stop reason: HOSPADM

## 2025-01-21 RX ORDER — SODIUM CHLORIDE 0.9 % (FLUSH) 0.9 %
5-40 SYRINGE (ML) INJECTION EVERY 12 HOURS SCHEDULED
Status: DISCONTINUED | OUTPATIENT
Start: 2025-01-21 | End: 2025-01-24 | Stop reason: HOSPADM

## 2025-01-21 RX ORDER — NITROGLYCERIN 0.4 MG/1
0.4 TABLET SUBLINGUAL EVERY 5 MIN PRN
Status: DISCONTINUED | OUTPATIENT
Start: 2025-01-21 | End: 2025-01-24 | Stop reason: HOSPADM

## 2025-01-21 RX ORDER — ROSUVASTATIN CALCIUM 10 MG/1
20 TABLET, COATED ORAL NIGHTLY
Status: DISCONTINUED | OUTPATIENT
Start: 2025-01-21 | End: 2025-01-24 | Stop reason: HOSPADM

## 2025-01-21 RX ORDER — ASPIRIN 81 MG/1
81 TABLET ORAL DAILY
Status: DISCONTINUED | OUTPATIENT
Start: 2025-01-21 | End: 2025-01-21

## 2025-01-21 RX ORDER — CARVEDILOL 12.5 MG/1
12.5 TABLET ORAL 2 TIMES DAILY WITH MEALS
Status: DISCONTINUED | OUTPATIENT
Start: 2025-01-21 | End: 2025-01-24 | Stop reason: HOSPADM

## 2025-01-21 RX ORDER — ASPIRIN 81 MG/1
81 TABLET, CHEWABLE ORAL DAILY
Status: DISCONTINUED | OUTPATIENT
Start: 2025-01-22 | End: 2025-01-22

## 2025-01-21 RX ORDER — POTASSIUM CHLORIDE 7.45 MG/ML
10 INJECTION INTRAVENOUS PRN
Status: DISCONTINUED | OUTPATIENT
Start: 2025-01-21 | End: 2025-01-24 | Stop reason: HOSPADM

## 2025-01-21 RX ORDER — UBIDECARENONE 75 MG
100 CAPSULE ORAL DAILY
Status: DISCONTINUED | OUTPATIENT
Start: 2025-01-21 | End: 2025-01-24 | Stop reason: HOSPADM

## 2025-01-21 RX ORDER — FAMOTIDINE 20 MG/1
20 TABLET, FILM COATED ORAL 2 TIMES DAILY
Status: DISCONTINUED | OUTPATIENT
Start: 2025-01-21 | End: 2025-01-21

## 2025-01-21 RX ORDER — POTASSIUM CHLORIDE 1500 MG/1
40 TABLET, EXTENDED RELEASE ORAL PRN
Status: DISCONTINUED | OUTPATIENT
Start: 2025-01-21 | End: 2025-01-24 | Stop reason: HOSPADM

## 2025-01-21 RX ORDER — POLYETHYLENE GLYCOL 3350 17 G/17G
17 POWDER, FOR SOLUTION ORAL DAILY PRN
Status: DISCONTINUED | OUTPATIENT
Start: 2025-01-21 | End: 2025-01-24 | Stop reason: HOSPADM

## 2025-01-21 RX ORDER — SODIUM CHLORIDE 0.9 % (FLUSH) 0.9 %
5-40 SYRINGE (ML) INJECTION PRN
Status: DISCONTINUED | OUTPATIENT
Start: 2025-01-21 | End: 2025-01-24 | Stop reason: HOSPADM

## 2025-01-21 RX ORDER — ACETAMINOPHEN 325 MG/1
650 TABLET ORAL EVERY 6 HOURS PRN
Status: DISCONTINUED | OUTPATIENT
Start: 2025-01-21 | End: 2025-01-24 | Stop reason: HOSPADM

## 2025-01-21 RX ORDER — SODIUM CHLORIDE 9 MG/ML
INJECTION, SOLUTION INTRAVENOUS PRN
Status: DISCONTINUED | OUTPATIENT
Start: 2025-01-21 | End: 2025-01-24 | Stop reason: HOSPADM

## 2025-01-21 RX ORDER — ATORVASTATIN CALCIUM 40 MG/1
40 TABLET, FILM COATED ORAL NIGHTLY
Status: DISCONTINUED | OUTPATIENT
Start: 2025-01-21 | End: 2025-01-21

## 2025-01-21 RX ORDER — TAMSULOSIN HYDROCHLORIDE 0.4 MG/1
0.4 CAPSULE ORAL DAILY
Status: DISCONTINUED | OUTPATIENT
Start: 2025-01-21 | End: 2025-01-24 | Stop reason: HOSPADM

## 2025-01-21 RX ORDER — GENTAMICIN SULFATE 1 MG/G
OINTMENT TOPICAL 3 TIMES DAILY
COMMUNITY

## 2025-01-21 RX ORDER — FAMOTIDINE 20 MG/1
20 TABLET, FILM COATED ORAL DAILY
Status: DISCONTINUED | OUTPATIENT
Start: 2025-01-21 | End: 2025-01-24 | Stop reason: HOSPADM

## 2025-01-21 RX ORDER — ONDANSETRON 2 MG/ML
4 INJECTION INTRAMUSCULAR; INTRAVENOUS EVERY 6 HOURS PRN
Status: DISCONTINUED | OUTPATIENT
Start: 2025-01-21 | End: 2025-01-24 | Stop reason: HOSPADM

## 2025-01-21 RX ADMIN — Medication 100 MCG: at 09:42

## 2025-01-21 RX ADMIN — AMIODARONE HYDROCHLORIDE 200 MG: 200 TABLET ORAL at 21:11

## 2025-01-21 RX ADMIN — EZETIMIBE 10 MG: 10 TABLET ORAL at 21:11

## 2025-01-21 RX ADMIN — SODIUM CHLORIDE, POTASSIUM CHLORIDE, SODIUM LACTATE AND CALCIUM CHLORIDE: 600; 310; 30; 20 INJECTION, SOLUTION INTRAVENOUS at 08:24

## 2025-01-21 RX ADMIN — TAMSULOSIN HYDROCHLORIDE 0.4 MG: 0.4 CAPSULE ORAL at 08:22

## 2025-01-21 RX ADMIN — SACUBITRIL AND VALSARTAN 1 TABLET: 24; 26 TABLET, FILM COATED ORAL at 08:21

## 2025-01-21 RX ADMIN — SODIUM CHLORIDE, POTASSIUM CHLORIDE, SODIUM LACTATE AND CALCIUM CHLORIDE: 600; 310; 30; 20 INJECTION, SOLUTION INTRAVENOUS at 21:15

## 2025-01-21 RX ADMIN — CARVEDILOL 12.5 MG: 12.5 TABLET, FILM COATED ORAL at 18:14

## 2025-01-21 RX ADMIN — APIXABAN 5 MG: 5 TABLET, FILM COATED ORAL at 08:21

## 2025-01-21 RX ADMIN — ROSUVASTATIN CALCIUM 20 MG: 10 TABLET, FILM COATED ORAL at 21:11

## 2025-01-21 RX ADMIN — SACUBITRIL AND VALSARTAN 1 TABLET: 24; 26 TABLET, FILM COATED ORAL at 21:11

## 2025-01-21 RX ADMIN — EMPAGLIFLOZIN 10 MG: 10 TABLET, FILM COATED ORAL at 08:21

## 2025-01-21 RX ADMIN — CARVEDILOL 12.5 MG: 12.5 TABLET, FILM COATED ORAL at 08:22

## 2025-01-21 RX ADMIN — FAMOTIDINE 20 MG: 20 TABLET, FILM COATED ORAL at 08:20

## 2025-01-21 RX ADMIN — AMIODARONE HYDROCHLORIDE 100 MG: 200 TABLET ORAL at 08:20

## 2025-01-21 ASSESSMENT — LIFESTYLE VARIABLES
HOW MANY STANDARD DRINKS CONTAINING ALCOHOL DO YOU HAVE ON A TYPICAL DAY: PATIENT DOES NOT DRINK
HOW OFTEN DO YOU HAVE A DRINK CONTAINING ALCOHOL: NEVER

## 2025-01-21 ASSESSMENT — PAIN SCALES - GENERAL
PAINLEVEL_OUTOF10: 0
PAINLEVEL_OUTOF10: 2

## 2025-01-21 ASSESSMENT — PAIN DESCRIPTION - FREQUENCY: FREQUENCY: CONTINUOUS

## 2025-01-21 ASSESSMENT — PAIN DESCRIPTION - ONSET: ONSET: SUDDEN

## 2025-01-21 ASSESSMENT — PAIN DESCRIPTION - ORIENTATION: ORIENTATION: LEFT;MID

## 2025-01-21 ASSESSMENT — PAIN DESCRIPTION - DESCRIPTORS: DESCRIPTORS: PRESSURE

## 2025-01-21 ASSESSMENT — PAIN DESCRIPTION - LOCATION: LOCATION: CHEST

## 2025-01-21 ASSESSMENT — PAIN - FUNCTIONAL ASSESSMENT: PAIN_FUNCTIONAL_ASSESSMENT: 0-10

## 2025-01-21 ASSESSMENT — PAIN DESCRIPTION - PAIN TYPE: TYPE: ACUTE PAIN

## 2025-01-21 NOTE — PROGRESS NOTES
Predictive Model Details          21 (Normal)  Factor Value    Calculated 1/21/2025 14:06 66% Age 74 years old    Deterioration Index Model 13% Respiratory rate 18     10% Systolic 103     8% Potassium 4.3 mmol/L     2% Sodium 139 mmol/L     1% Pulse oximetry 98 %     0% Hematocrit 43.1 %     0% Temperature 98.7 °F (37.1 °C)     0% Pulse 70     0% WBC count 6.0 K/uL      End of Shift Note    Bedside shift change report given to Savannah DUNHAM (oncoming nurse) by Veronica Cardoza RN (offgoing nurse).  Report included the following information SBAR and Kardex    Shift worked: day     Shift summary and any significant changes:    1330 Pt arrived from ED prior to going to [facility] for a LHC with Saniya DOW. Pt prepped for LHC.     LHC cancelled today. Will be done tomorrow.     PT/OT came to work with pt.     Otherwise uneventful shift.    Concerns for physician to address:       Zone phone for oncoming shift:          Activity:  Level of Assistance: Independent  Number times ambulated in hallways past shift: 0  Number of times OOB to chair past shift: 1    Cardiac:   Cardiac Monitoring: Yes           Access:  Current line(s): PIV     Genitourinary:        Respiratory:   O2 Device: None (Room air)  Chronic home O2 use?: NO  Incentive spirometer at bedside: NO    GI:     Current diet:  ADULT DIET; Regular; No Caffeine  Passing flatus: YES    Pain Management:   Patient states pain is manageable on current regimen: YES    Skin:  Javed Scale Score: 20  Interventions: Wound Offloading (Prevention Methods): Bed, pressure reduction mattress, Elevate heels, Pillows, Repositioning, Turning    Patient Safety:  Fall Risk: Nursing Judgement-Fall Risk High(Add Comments): No  Fall Risk Interventions  Nursing Judgement-Fall Risk High(Add Comments): No  Toilet Every 2 Hours-In Advance of Need: Yes  Hourly Visual Checks: Awake, In bed  Fall Visual Posted: Socks  Room Door Open: Yes  Alarm On: Bed  Patient Moved Closer to Nursing Station:  No    Active Consults:   IP CONSULT TO CARDIOLOGY    Length of Stay:  Expected LOS: 2  Actual LOS: 0    Veronica Cardoza RN

## 2025-01-21 NOTE — PLAN OF CARE
Problem: Occupational Therapy - Adult  Goal: By Discharge: Performs self-care activities at highest level of function for planned discharge setting.  See evaluation for individualized goals.  Description: FUNCTIONAL STATUS PRIOR TO ADMISSION:  independent with all ADLs and IADLS, works at the [employer] in [place], drives ~70  miles to work       HOME SUPPORT: Patient lived with wife whom is disabled due to a history of polio.  Wife walks with crutches and is able to care for herself.    Occupational Therapy Goals:  Initiated 1/21/2025  1.  Patient will perform grooming with item retrieval with Pitt within 7 day(s).  2.  Patient will perform lower body dressing with Pitt within 7 day(s).  3.  Patient will perform toileting with Pitt within 7 day(s).  4.  Patient will perform toilet transfers with Pitt  within 7 day(s).  5.  Patient will perform one light IADL task in standing with Pitt within 7 day(s).      Outcome: Progressing  OCCUPATIONAL THERAPY EVALUATION    Patient: Tyrese Arnett Jr (74 y.o. male)  Date: 1/21/2025  Primary Diagnosis: Arrhythmia [I49.9]  Chest pain [R07.9]  ICD (implantable cardioverter-defibrillator) discharge [Z45.02]  Procedure(s) (LRB):  Left heart cath / coronary angiography (N/A)       Precautions: Fall Risk                  ASSESSMENT :  The patient is limited by decreased functional mobility, independence in ADLs, high-level IADLs, balance.    Based on the impairments listed above pt was seated edge of bed with call bell alarming.  Pt was attempting to get out of bed to go to the bathroom.  Educated pt on calling and waiting for assist from staff for mobility and ADLs at this time.  Pts nurse cleared pt for session.  No report of chest pain/tightness with activity but pt does have SEVILLA on exertion.  O2 sat on room air with activity was 100%.  Cues needed to slow down during mobility.  CGA needed for turns and higher level balance during  EuroQoL-5D. [employer], 13, [phone]       Activity Tolerance:   Fair , requires rest breaks, observed shortness of breath on exertion, and SpO2 stable on room air    After treatment:   Patient left in no apparent distress in bed, Call bell within reach, Side rails x3, Updated patient's board on functional status and mobility recommendations, and unable to arm bed alarm, nursing approved to leave bed alarm off     COMMUNICATION/EDUCATION:   The patient's plan of care was discussed with: registered nurse and patient    Patient Education  Education Given To: Patient  Education Provided: Plan of Care;ADL Adaptive Strategies;Mobility Training;Fall Prevention Strategies  Education Method: Verbal;Demonstration;Teach Back  Barriers to Learning: None  Education Outcome: Verbalized understanding    Thank you for this referral.  Luz Townsend OTR/L  Minutes: 12    Occupational Therapy Evaluation Charge Determination   History Examination Decision-Making   LOW Complexity : Brief history review  LOW Complexity: 1-3 Performance deficits relating to physical, cognitive, or psychosocial skills that result in activity limitations and/or participation restrictions LOW Complexity: No comorbidities that affect functional and  no verbal  or physical assist needed to complete eval tasks   Based on the above components, the patient evaluation is determined to be of the following complexity level: Low

## 2025-01-21 NOTE — PROGRESS NOTES
Pharmacy Medication History Review    Medication history obtained by Kathy Luna while patient was in room ER14/14 and was completed based on information available during current patient encounter. Medication history was completed after home meds were reconciled by provider.    Pharmacist Admission Medication Reconciliation Recommendations:            PTA medication list was corrected to the following:   Prior to Admission Medications   Prescriptions Last Dose Informant   Omega-3 Fatty Acids (FISH OIL PO) 1/20/2025 Self   Sig: Take 1 tablet by mouth in the morning and at bedtime   amiodarone (CORDARONE) 200 MG tablet 1/20/2025 Self   Sig: Take 0.5 tablets by mouth daily   apixaban (ELIQUIS) 5 MG TABS tablet 1/20/2025 Self   Sig: TAKE 1 TABLET BY MOUTH TWO (2) TIMES A DAY. FOR BLOOD THINNER   aspirin 81 MG EC tablet 1/20/2025 Self   Sig: Take 1 tablet by mouth daily   carvedilol (COREG) 12.5 MG tablet 1/20/2025 Self   Sig: Take 1 tablet by mouth 2 times daily (with meals)   cyanocobalamin 1000 MCG tablet 1/20/2025 Self   Sig: Take 1 tablet by mouth daily   empagliflozin (JARDIANCE) 10 MG tablet 1/20/2025 Self   Sig: Take 1 tablet by mouth daily   ezetimibe (ZETIA) 10 MG tablet 1/20/2025 Self   Sig: TAKE 1 TABLET EVERY DAY TO LOWER CHOLESTEROL   furosemide (LASIX) 20 MG tablet 1/20/2025 Self   Sig: TAKE 1 TABLET BY MOUTH EVERY MORNING FOR FLUID   gentamicin (GARAMYCIN) 0.1 % ointment 1/20/2025 Self   Sig: Apply topically 3 times daily To right leg   nitroGLYCERIN (NITROLINGUAL) 0.4 MG/SPRAY 0.4 mg spray Unknown Self   Sig: Place 1 spray under the tongue every 5 minutes as needed for Chest pain   rosuvastatin (CRESTOR) 20 MG tablet 1/20/2025 Self   Sig: Take 0.5 tablets by mouth every evening   sacubitril-valsartan (ENTRESTO) 24-26 MG per tablet 1/20/2025 Self   Sig: Take 1 tablet by mouth 2 times daily   tamsulosin (FLOMAX) 0.4 MG capsule 1/20/2025 Self   Sig: TAKE 1 CAPSULE BY MOUTH DAILY   vitamin D  (CHOLECALCIFEROL) 25 MCG (1000 UT) TABS tablet 1/20/2025 Self   Sig: Take 1 tablet by mouth daily      Facility-Administered Medications: None         Pertinent Information:      The following medications have been added:   + gentamicin oint  + fish oil    The following medications have been removed:   - clobetasol oint  - lutein    The following medications have been modified:   Amiodarone 200mg OLD: 1 tablet every day NEW: 1/2 tablet (100mg) every day  Rosuvastatin 20mg OLD: 1 tablet every evening NEW: 1/2 tablet (10mg) every evening  Correction of 6 sigs    The patient takes the following medications differently than prescribed: none    Medication history obtained from: Chart review notes, Patient, and Rx Query/dispense report    Who takes care of medications prior to arrival: Patient  Able to recall: Name of medication(s), Dose of medication(s), Frequency of medication(s), and Indication of medication(s)  Adherence: Good adherence (>80-90% doses)        Patient's preferred pharmacy: Confirmed    Is patient interested in MTB? N/A    Allergy Update: Patient has no known allergies.    Social history:  Smoking: No ( )  Drinking: No  Recreational drug: no    Of Note:   none  Patient's PTA medication list include the high risk meds: None.  Patient's medical history include the following CMS readmission measures: None .    Thank you,  Kathy Luna CPhT  Medication History Pharmacy Technician Specialist   Available M-F 9:00am - 5:00pm via Zone ([phone])      Disclaimer:   Patient was interviewed regarding current PTA medication list, use and drug allergies and was questioned regarding use of any other inhalers, topical products, over the counter medications, herbal medications, vitamin products or ophthalmic/nasal/otic medication use.

## 2025-01-21 NOTE — ED NOTES
Pt room changed to IVCU, report given to Veronica.          Signed         TRANSFER - OUT REPORT:     Verbal report given to Veronica on Tyrese Arnett Jr  being transferred to 2111 for routine progression of patient care        Report consisted of patient's Situation, Background, Assessment and   Recommendations(SBAR).      Information from the following report(s) Nurse Handoff Report, Index, ED Encounter Summary, ED SBAR, Adult Overview, Intake/Output, MAR, Recent Results, Med Rec Status, Cardiac Rhythm sinus, and Neuro Assessment was reviewed with the receiving nurse.     [place] Fall Assessment:     Presents to emergency department  because of falls (Syncope, seizure, or loss of consciousness): No  Age > 70: Yes  Altered Mental Status, Intoxication with alcohol or substance confusion (Disorientation, impaired judgment, poor safety awaremess, or inability to follow instructions): No  Impaired Mobility: Ambulates or transfers with assistive devices or assistance; Unable to ambulate or transer.: No  Nursing Judgement: No            Lines:   Peripheral IV 01/21/25 Left;Proximal Forearm (Active)         Opportunity for questions and clarification was provided.       Patient transported with:  Transport w telebox

## 2025-01-21 NOTE — ED NOTES
TRANSFER - OUT REPORT:    Verbal report given to Randee on Tyrese Arnett Jr  being transferred to 2111 for routine progression of patient care       Report consisted of patient's Situation, Background, Assessment and   Recommendations(SBAR).     Information from the following report(s) Nurse Handoff Report, Index, ED Encounter Summary, ED SBAR, Adult Overview, Intake/Output, MAR, Recent Results, Med Rec Status, Cardiac Rhythm sinus, and Neuro Assessment was reviewed with the receiving nurse.    [place] Fall Assessment:    Presents to emergency department  because of falls (Syncope, seizure, or loss of consciousness): No  Age > 70: Yes  Altered Mental Status, Intoxication with alcohol or substance confusion (Disorientation, impaired judgment, poor safety awaremess, or inability to follow instructions): No  Impaired Mobility: Ambulates or transfers with assistive devices or assistance; Unable to ambulate or transer.: No  Nursing Judgement: No          Lines:   Peripheral IV 01/21/25 Left;Proximal Forearm (Active)        Opportunity for questions and clarification was provided.      Patient transported with:  Transport w telebox

## 2025-01-21 NOTE — ED PROVIDER NOTES
[facility]ospitals EMERGENCY DEPT  EMERGENCY DEPARTMENT HISTORY AND PHYSICAL EXAM      Date: 1/21/2025  Patient Name: Tyrese Arnett Jr  MRN: [id number]  Birthdate 1950  Date of evaluation: 1/21/2025  Provider: Rizwan Raygoza MD   Note Started: 6:40 AM EST 1/21/25    HISTORY OF PRESENT ILLNESS     Chief Complaint   Patient presents with    Pacemaker Problem     Patient has a pacemaker/defibrillator and advises that his defibrillator shocked him once at 0230 while he was in the shower getting ready for work. This has only happened once before, about 4 years ago. He advised that he got very dizzy and weak. He sees Dr. Tatum for cardiology and Dr. Reese for EP. He has a [employer] pacemaker/defibrillator. He still doesn't \"feel right\". He was ambulatory into triage and declined a wheelchair to the room.     Knee Injury     Patient fell about a week ago and landed on his left knee. He advises that the knee is still hurting. There is scabbing and notable bruising to the left knee. Able to ambulate without assistive devices to the room.       History Provided By: Patient    HPI: Tyrese Arnett Jr is a 74 y.o. male PMH as below presenting with defibrillation of his infected ICD with V-fib x 2 and appropriate shocking    PAST MEDICAL HISTORY   Past Medical History:  Past Medical History:   Diagnosis Date    A-fib (HCC) 8/10/2018    Abnormal PSA 2/16/2011    Abnormal stress ECG 8/10/2018    CAD (coronary artery disease) 2/16/2011    Cancer (HCC)     Cardiomyopathy (HCC) 2/16/2011    Chronic diastolic HF (heart failure) (HCC) 8/10/2018    Encounter for long-term (current) use of other medications 2/16/2011    Esophageal reflux 2/16/2011    Essential hypertension, benign 2/16/2011    GERD (gastroesophageal reflux disease) 5/14/2012    Heart attack (HCC)     Long term current use of anticoagulant therapy     Low back pain 7/2/2013    Mixed hyperlipidemia 2/16/2011    Nocturia 2/16/2011    Pacemaker     Presence of biventricular  with Auto Differential    Collection Time: 01/21/25  3:59 AM   Result Value Ref Range    WBC 6.0 4.1 - 11.1 K/uL    RBC 4.53 4.10 - 5.70 M/uL    Hemoglobin 14.3 12.1 - 17.0 g/dL    Hematocrit 43.1 36.6 - 50.3 %    MCV 95.1 80.0 - 99.0 FL    MCH 31.6 26.0 - 34.0 PG    MCHC 33.2 30.0 - 36.5 g/dL    RDW 13.1 11.5 - 14.5 %    Platelets 169 150 - 400 K/uL    MPV 9.9 8.9 - 12.9 FL    Nucleated RBCs 0.0 0  WBC    nRBC 0.00 0.00 - 0.01 K/uL    Neutrophils % 76.3 (H) 32.0 - 75.0 %    Lymphocytes % 12.4 12.0 - 49.0 %    Monocytes % 8.0 5.0 - 13.0 %    Eosinophils % 2.3 0.0 - 7.0 %    Basophils % 0.7 0.0 - 1.0 %    Immature Granulocytes % 0.3 0.0 - 0.5 %    Neutrophils Absolute 4.58 1.80 - 8.00 K/UL    Lymphocytes Absolute 0.74 (L) 0.80 - 3.50 K/UL    Monocytes Absolute 0.48 0.00 - 1.00 K/UL    Eosinophils Absolute 0.14 0.00 - 0.40 K/UL    Basophils Absolute 0.04 0.00 - 0.10 K/UL    Immature Granulocytes Absolute 0.02 0.00 - 0.04 K/UL    Differential Type SMEAR SCANNED      RBC Comment ANISOCYTOSIS  1+       Magnesium    Collection Time: 01/21/25  3:59 AM   Result Value Ref Range    Magnesium 2.1 1.6 - 2.4 mg/dL   Troponin    Collection Time: 01/21/25  3:59 AM   Result Value Ref Range    Troponin, High Sensitivity 12 0 - 76 ng/L   Extra Tubes Hold    Collection Time: 01/21/25  3:59 AM   Result Value Ref Range    Specimen HOld hold1     Comment:        Add-on orders for these samples will be processed based on acceptable specimen integrity and analyte stability, which may vary by analyte.       EKG:.See ED Course Below    Radiologic Studies:  Non-plain film images such as CT, Ultrasound and MRI are read by the radiologist. Plain radiographic images are visualized and preliminarily interpreted by the ED Provider with the following findings: See ED Course Below    Interpretation per the Radiologist below, if available at the time of this note:  XR KNEE LEFT (1-2 VIEWS)   Final Result   No acute bony abnormality. Anterior

## 2025-01-21 NOTE — ED NOTES
Bedside shift change report given to Anisa RN (oncoming nurse) by Mary RN (offgoing nurse). Report included the following information ED Encounter Summary, ED SBAR, MAR, Recent Results, and Neuro Assessment.

## 2025-01-21 NOTE — H&P
Wt 113.9 kg (251 lb 1.7 oz)   SpO2 98%   BMI 38.18 kg/m²     General appearance: 74-year-old male sitting up in bed alert awake oriented x 4 nontoxic appearing, no apparent distress, appears stated age and cooperative.  HEENT:  Normal cephalic, atraumatic without obvious deformity. Pupils equal, round, and reactive to light.  Extra ocular muscles intact. Conjunctivae/corneas clear.  Neck: Supple, with full range of motion. No jugular venous distention. Trachea midline.  Respiratory:  Normal respiratory effort. Clear to auscultation, bilaterally without Rales/Wheezes/Rhonchi.  Cardiovascular:  Regular rate and rhythm with normal S1/S2 without murmurs, rubs or gallops.  Abdomen: Soft, non-tender, non-distended with normal bowel sounds.  Musculoskeletal:  No clubbing, cyanosis or edema bilaterally.  Full range of motion without deformity.  Skin: Skin color, texture, turgor normal.  No rashes or lesions.  Neurologic:  Neurovascularly intact without any focal sensory/motor deficits.   Psychiatric:  Alert and oriented, thought content appropriate,  Peripheral Pulses: +2 palpable, equal bilaterally       Labs:     Recent Labs     01/21/25  0359   WBC 6.0   HGB 14.3   HCT 43.1        Recent Labs     01/21/25  0359      K 4.1      CO2 24   BUN 20   CREATININE 1.85*   CALCIUM 9.3     No results for input(s): \"AST\", \"ALT\", \"BILIDIR\", \"BILITOT\", \"ALKPHOS\" in the last 72 hours.  No results for input(s): \"INR\" in the last 72 hours.  Recent Labs     01/21/25  0359   TROPHS 12       Urinalysis:    No results found for: \"NITRU\", \"WBCUA\", \"BACTERIA\", \"RBCUA\", \"BLOODU\", \"SPECGRAV\", \"GLUCOSEU\"    Radiology:     CXR: I have reviewed the CXR.  EKG:  I have reviewed the EKG.     XR KNEE LEFT (1-2 VIEWS)   Final Result   No acute bony abnormality. Anterior soft tissue swelling.      Electronically signed by Tim Benites      XR CHEST PORTABLE   Final Result      No acute process.         Electronically signed by  minutes for evaluation, consultation, reviewing chart, test results and imaging studies.     Zoe Snow MD    Comment: Please note this report has been produced using speech recognition software and may contain errors related to that system including errors in grammar, punctuation, and spelling, as well as words and phrases that may be inappropriate. If there are any questions or concerns, please feel free to contact the dictating provider for clarification.

## 2025-01-21 NOTE — CONSULTS
[facility]  [address] [place] [address]  [phone]  [email or web address]       CARDIOLOGY CONSULTATION       Date of  Admission: 1/21/2025  3:44 AM     Admission type:Emergency   Primary Care Physician:Steve Rice MD     Attending Provider: Zoe Krishnamurthy MD  Cardiology Provider: Dr. Meraz  CC/REASON FOR CONSULT: Ventricular tachycardia     Subjective:   74-year-old patient with history of ischemic cardiomyopathy (left ventricular ejection fraction 20%), coronary artery disease (status post multiple stent placements, most recently in 2018), normal AICD status, chronic atrial fibrillation on apixaban (last dose this morning) is being managed in the hospital for ICD shock    I personally reviewed electrograms from the patient's AICD.  Patient appears to have experienced ventricular tachycardia that required defibrillation attempt after failed antitachycardia pacing x 2.  No recent prior or subsequent episodes.  The patient was seen and examined in the emergency room.  He denies any recent chest pain or tightness.  He did experience a fall recently but no passing out episodes.  He has some superficial injury over the left knee and has some more swelling in the left leg.  The patient continues to work full-time    Patient Active Problem List    Diagnosis Date Noted    Arrhythmia 01/21/2025    Chronic renal disease, stage III ([facility]) 05/31/2022    VF (ventricular fibrillation) 08/10/2018    VT (ventricular tachycardia) ([facility]) 08/10/2018    A-fib ([facility]) 08/10/2018    Presence of biventricular AICD 08/10/2018    Systolic CHF, chronic ([facility]) 08/10/2018    Chronic diastolic HF (heart failure) ([facility]) 08/10/2018    Abnormal stress ECG 08/10/2018    S/P coronary artery stent placement 08/10/2018    Severe obesity (BMI 35.0-39.9) with comorbidity 04/19/2018    Low back pain 07/02/2013    Automatic implantable cardioverter-defibrillator in situ 06/11/2012

## 2025-01-22 ENCOUNTER — APPOINTMENT (OUTPATIENT)
Facility: HOSPITAL | Age: 75
DRG: 324 | End: 2025-01-22
Payer: MEDICARE

## 2025-01-22 DIAGNOSIS — Z95.5 STENTED CORONARY ARTERY: Primary | ICD-10-CM

## 2025-01-22 LAB
ACT BLD: 262 SECS (ref 79–138)
ACT BLD: 291 SECS (ref 79–138)
ACT BLD: 337 SECS (ref 79–138)
ANION GAP SERPL CALC-SCNC: 7 MMOL/L (ref 2–12)
BASOPHILS # BLD: 0.05 K/UL (ref 0–0.1)
BASOPHILS NFR BLD: 0.8 % (ref 0–1)
BUN SERPL-MCNC: 18 MG/DL (ref 6–20)
BUN/CREAT SERPL: 14 (ref 12–20)
CALCIUM SERPL-MCNC: 8.4 MG/DL (ref 8.5–10.1)
CHLORIDE SERPL-SCNC: 111 MMOL/L (ref 97–108)
CHOLEST SERPL-MCNC: 114 MG/DL
CO2 SERPL-SCNC: 24 MMOL/L (ref 21–32)
CREAT SERPL-MCNC: 1.3 MG/DL (ref 0.7–1.3)
DIFFERENTIAL METHOD BLD: ABNORMAL
ECHO BSA: 2.31 M2
ECHO BSA: 2.31 M2
EKG ATRIAL RATE: 76 BPM
EKG DIAGNOSIS: NORMAL
EKG P AXIS: -16 DEGREES
EKG P-R INTERVAL: 176 MS
EKG Q-T INTERVAL: 452 MS
EKG QRS DURATION: 142 MS
EKG QTC CALCULATION (BAZETT): 508 MS
EKG R AXIS: 241 DEGREES
EKG T AXIS: 52 DEGREES
EKG VENTRICULAR RATE: 76 BPM
EOSINOPHIL # BLD: 0.18 K/UL (ref 0–0.4)
EOSINOPHIL NFR BLD: 2.9 % (ref 0–7)
ERYTHROCYTE [DISTWIDTH] IN BLOOD BY AUTOMATED COUNT: 13.2 % (ref 11.5–14.5)
EST. AVERAGE GLUCOSE BLD GHB EST-MCNC: 111 MG/DL
GLUCOSE SERPL-MCNC: 104 MG/DL (ref 65–100)
HBA1C MFR BLD: 5.5 % (ref 4–5.6)
HCT VFR BLD AUTO: 39.5 % (ref 36.6–50.3)
HDLC SERPL-MCNC: 43 MG/DL
HDLC SERPL: 2.7 (ref 0–5)
HGB BLD-MCNC: 12.8 G/DL (ref 12.1–17)
IMM GRANULOCYTES # BLD AUTO: 0.04 K/UL (ref 0–0.04)
IMM GRANULOCYTES NFR BLD AUTO: 0.6 % (ref 0–0.5)
LDLC SERPL CALC-MCNC: 56 MG/DL (ref 0–100)
LYMPHOCYTES # BLD: 1.01 K/UL (ref 0.8–3.5)
LYMPHOCYTES NFR BLD: 16.1 % (ref 12–49)
MCH RBC QN AUTO: 31.4 PG (ref 26–34)
MCHC RBC AUTO-ENTMCNC: 32.4 G/DL (ref 30–36.5)
MCV RBC AUTO: 97.1 FL (ref 80–99)
MONOCYTES # BLD: 0.53 K/UL (ref 0–1)
MONOCYTES NFR BLD: 8.4 % (ref 5–13)
NEUTS SEG # BLD: 4.47 K/UL (ref 1.8–8)
NEUTS SEG NFR BLD: 71.2 % (ref 32–75)
NRBC # BLD: 0 K/UL (ref 0–0.01)
NRBC BLD-RTO: 0 PER 100 WBC
PLATELET # BLD AUTO: 152 K/UL (ref 150–400)
PMV BLD AUTO: 9.7 FL (ref 8.9–12.9)
POTASSIUM SERPL-SCNC: 4.2 MMOL/L (ref 3.5–5.1)
RBC # BLD AUTO: 4.07 M/UL (ref 4.1–5.7)
SODIUM SERPL-SCNC: 142 MMOL/L (ref 136–145)
TRIGL SERPL-MCNC: 75 MG/DL
VLDLC SERPL CALC-MCNC: 15 MG/DL
WBC # BLD AUTO: 6.3 K/UL (ref 4.1–11.1)

## 2025-01-22 PROCEDURE — 6370000000 HC RX 637 (ALT 250 FOR IP): Performed by: STUDENT IN AN ORGANIZED HEALTH CARE EDUCATION/TRAINING PROGRAM

## 2025-01-22 PROCEDURE — 93005 ELECTROCARDIOGRAM TRACING: CPT | Performed by: INTERNAL MEDICINE

## 2025-01-22 PROCEDURE — C1725 CATH, TRANSLUMIN NON-LASER: HCPCS | Performed by: STUDENT IN AN ORGANIZED HEALTH CARE EDUCATION/TRAINING PROGRAM

## 2025-01-22 PROCEDURE — C9600 PERC DRUG-EL COR STENT SING: HCPCS | Performed by: STUDENT IN AN ORGANIZED HEALTH CARE EDUCATION/TRAINING PROGRAM

## 2025-01-22 PROCEDURE — 36415 COLL VENOUS BLD VENIPUNCTURE: CPT

## 2025-01-22 PROCEDURE — B2151ZZ FLUOROSCOPY OF LEFT HEART USING LOW OSMOLAR CONTRAST: ICD-10-PCS | Performed by: STUDENT IN AN ORGANIZED HEALTH CARE EDUCATION/TRAINING PROGRAM

## 2025-01-22 PROCEDURE — C1753 CATH, INTRAVAS ULTRASOUND: HCPCS | Performed by: STUDENT IN AN ORGANIZED HEALTH CARE EDUCATION/TRAINING PROGRAM

## 2025-01-22 PROCEDURE — 80048 BASIC METABOLIC PNL TOTAL CA: CPT

## 2025-01-22 PROCEDURE — 80061 LIPID PANEL: CPT

## 2025-01-22 PROCEDURE — 2709999900 HC NON-CHARGEABLE SUPPLY: Performed by: STUDENT IN AN ORGANIZED HEALTH CARE EDUCATION/TRAINING PROGRAM

## 2025-01-22 PROCEDURE — 99152 MOD SED SAME PHYS/QHP 5/>YRS: CPT | Performed by: STUDENT IN AN ORGANIZED HEALTH CARE EDUCATION/TRAINING PROGRAM

## 2025-01-22 PROCEDURE — C1761 HC CATH TRANSLUM INTRAVASCULAR LITHOTRIPSY CORONARY: HCPCS | Performed by: STUDENT IN AN ORGANIZED HEALTH CARE EDUCATION/TRAINING PROGRAM

## 2025-01-22 PROCEDURE — 76937 US GUIDE VASCULAR ACCESS: CPT | Performed by: STUDENT IN AN ORGANIZED HEALTH CARE EDUCATION/TRAINING PROGRAM

## 2025-01-22 PROCEDURE — 02703ZZ DILATION OF CORONARY ARTERY, ONE ARTERY, PERCUTANEOUS APPROACH: ICD-10-PCS | Performed by: STUDENT IN AN ORGANIZED HEALTH CARE EDUCATION/TRAINING PROGRAM

## 2025-01-22 PROCEDURE — C1894 INTRO/SHEATH, NON-LASER: HCPCS | Performed by: STUDENT IN AN ORGANIZED HEALTH CARE EDUCATION/TRAINING PROGRAM

## 2025-01-22 PROCEDURE — 6360000004 HC RX CONTRAST MEDICATION: Performed by: STUDENT IN AN ORGANIZED HEALTH CARE EDUCATION/TRAINING PROGRAM

## 2025-01-22 PROCEDURE — C1874 STENT, COATED/COV W/DEL SYS: HCPCS | Performed by: STUDENT IN AN ORGANIZED HEALTH CARE EDUCATION/TRAINING PROGRAM

## 2025-01-22 PROCEDURE — 6360000002 HC RX W HCPCS: Performed by: INTERNAL MEDICINE

## 2025-01-22 PROCEDURE — 2500000003 HC RX 250 WO HCPCS: Performed by: STUDENT IN AN ORGANIZED HEALTH CARE EDUCATION/TRAINING PROGRAM

## 2025-01-22 PROCEDURE — 6360000002 HC RX W HCPCS: Performed by: STUDENT IN AN ORGANIZED HEALTH CARE EDUCATION/TRAINING PROGRAM

## 2025-01-22 PROCEDURE — 85347 COAGULATION TIME ACTIVATED: CPT

## 2025-01-22 PROCEDURE — C1713 ANCHOR/SCREW BN/BN,TIS/BN: HCPCS | Performed by: STUDENT IN AN ORGANIZED HEALTH CARE EDUCATION/TRAINING PROGRAM

## 2025-01-22 PROCEDURE — 4A023N7 MEASUREMENT OF CARDIAC SAMPLING AND PRESSURE, LEFT HEART, PERCUTANEOUS APPROACH: ICD-10-PCS | Performed by: STUDENT IN AN ORGANIZED HEALTH CARE EDUCATION/TRAINING PROGRAM

## 2025-01-22 PROCEDURE — B2111ZZ FLUOROSCOPY OF MULTIPLE CORONARY ARTERIES USING LOW OSMOLAR CONTRAST: ICD-10-PCS | Performed by: STUDENT IN AN ORGANIZED HEALTH CARE EDUCATION/TRAINING PROGRAM

## 2025-01-22 PROCEDURE — 99153 MOD SED SAME PHYS/QHP EA: CPT | Performed by: STUDENT IN AN ORGANIZED HEALTH CARE EDUCATION/TRAINING PROGRAM

## 2025-01-22 PROCEDURE — 1100000003 HC PRIVATE W/ TELEMETRY

## 2025-01-22 PROCEDURE — C1887 CATHETER, GUIDING: HCPCS | Performed by: STUDENT IN AN ORGANIZED HEALTH CARE EDUCATION/TRAINING PROGRAM

## 2025-01-22 PROCEDURE — 027034Z DILATION OF CORONARY ARTERY, ONE ARTERY WITH DRUG-ELUTING INTRALUMINAL DEVICE, PERCUTANEOUS APPROACH: ICD-10-PCS | Performed by: STUDENT IN AN ORGANIZED HEALTH CARE EDUCATION/TRAINING PROGRAM

## 2025-01-22 PROCEDURE — 02F03ZZ FRAGMENTATION IN CORONARY ARTERY, ONE ARTERY, PERCUTANEOUS APPROACH: ICD-10-PCS | Performed by: STUDENT IN AN ORGANIZED HEALTH CARE EDUCATION/TRAINING PROGRAM

## 2025-01-22 PROCEDURE — C1769 GUIDE WIRE: HCPCS | Performed by: STUDENT IN AN ORGANIZED HEALTH CARE EDUCATION/TRAINING PROGRAM

## 2025-01-22 PROCEDURE — 93458 L HRT ARTERY/VENTRICLE ANGIO: CPT | Performed by: STUDENT IN AN ORGANIZED HEALTH CARE EDUCATION/TRAINING PROGRAM

## 2025-01-22 PROCEDURE — 6370000000 HC RX 637 (ALT 250 FOR IP): Performed by: INTERNAL MEDICINE

## 2025-01-22 PROCEDURE — 93971 EXTREMITY STUDY: CPT

## 2025-01-22 PROCEDURE — 85027 COMPLETE CBC AUTOMATED: CPT

## 2025-01-22 PROCEDURE — C2623 CATH, TRANSLUMIN, DRUG-COAT: HCPCS | Performed by: STUDENT IN AN ORGANIZED HEALTH CARE EDUCATION/TRAINING PROGRAM

## 2025-01-22 PROCEDURE — 92978 ENDOLUMINL IVUS OCT C 1ST: CPT | Performed by: STUDENT IN AN ORGANIZED HEALTH CARE EDUCATION/TRAINING PROGRAM

## 2025-01-22 PROCEDURE — 2500000003 HC RX 250 WO HCPCS: Performed by: INTERNAL MEDICINE

## 2025-01-22 PROCEDURE — 83036 HEMOGLOBIN GLYCOSYLATED A1C: CPT

## 2025-01-22 PROCEDURE — 2580000003 HC RX 258: Performed by: STUDENT IN AN ORGANIZED HEALTH CARE EDUCATION/TRAINING PROGRAM

## 2025-01-22 DEVICE — STENT ONYXNG35030UX ONYX 3.50X30RX
Type: IMPLANTABLE DEVICE | Status: FUNCTIONAL
Brand: ONYX FRONTIER™

## 2025-01-22 RX ORDER — CLOPIDOGREL BISULFATE 75 MG/1
75 TABLET ORAL DAILY
Status: DISCONTINUED | OUTPATIENT
Start: 2025-01-23 | End: 2025-01-24 | Stop reason: HOSPADM

## 2025-01-22 RX ORDER — VERAPAMIL HYDROCHLORIDE 2.5 MG/ML
INJECTION, SOLUTION INTRAVENOUS PRN
Status: DISCONTINUED | OUTPATIENT
Start: 2025-01-22 | End: 2025-01-22 | Stop reason: HOSPADM

## 2025-01-22 RX ORDER — ASPIRIN 81 MG/1
TABLET, CHEWABLE ORAL PRN
Status: DISCONTINUED | OUTPATIENT
Start: 2025-01-22 | End: 2025-01-22 | Stop reason: HOSPADM

## 2025-01-22 RX ORDER — IOPAMIDOL 755 MG/ML
INJECTION, SOLUTION INTRAVASCULAR PRN
Status: DISCONTINUED | OUTPATIENT
Start: 2025-01-22 | End: 2025-01-22 | Stop reason: HOSPADM

## 2025-01-22 RX ORDER — SODIUM CHLORIDE 0.9 % (FLUSH) 0.9 %
5-40 SYRINGE (ML) INJECTION PRN
Status: DISCONTINUED | OUTPATIENT
Start: 2025-01-22 | End: 2025-01-24 | Stop reason: HOSPADM

## 2025-01-22 RX ORDER — SODIUM CHLORIDE 0.9 % (FLUSH) 0.9 %
5-40 SYRINGE (ML) INJECTION EVERY 12 HOURS SCHEDULED
Status: DISCONTINUED | OUTPATIENT
Start: 2025-01-22 | End: 2025-01-24 | Stop reason: HOSPADM

## 2025-01-22 RX ORDER — SODIUM CHLORIDE 9 MG/ML
INJECTION, SOLUTION INTRAVENOUS CONTINUOUS
Status: ACTIVE | OUTPATIENT
Start: 2025-01-22 | End: 2025-01-22

## 2025-01-22 RX ORDER — FENTANYL CITRATE 50 UG/ML
INJECTION, SOLUTION INTRAMUSCULAR; INTRAVENOUS PRN
Status: DISCONTINUED | OUTPATIENT
Start: 2025-01-22 | End: 2025-01-22 | Stop reason: HOSPADM

## 2025-01-22 RX ORDER — SODIUM CHLORIDE 9 MG/ML
INJECTION, SOLUTION INTRAVENOUS PRN
Status: DISCONTINUED | OUTPATIENT
Start: 2025-01-22 | End: 2025-01-24 | Stop reason: HOSPADM

## 2025-01-22 RX ORDER — LIDOCAINE HYDROCHLORIDE 10 MG/ML
INJECTION, SOLUTION INFILTRATION; PERINEURAL PRN
Status: DISCONTINUED | OUTPATIENT
Start: 2025-01-22 | End: 2025-01-22 | Stop reason: HOSPADM

## 2025-01-22 RX ORDER — ACETAMINOPHEN 325 MG/1
650 TABLET ORAL EVERY 4 HOURS PRN
Status: DISCONTINUED | OUTPATIENT
Start: 2025-01-22 | End: 2025-01-24 | Stop reason: SDUPTHER

## 2025-01-22 RX ORDER — HEPARIN SODIUM 1000 [USP'U]/ML
INJECTION, SOLUTION INTRAVENOUS; SUBCUTANEOUS PRN
Status: DISCONTINUED | OUTPATIENT
Start: 2025-01-22 | End: 2025-01-22 | Stop reason: HOSPADM

## 2025-01-22 RX ADMIN — EMPAGLIFLOZIN 10 MG: 10 TABLET, FILM COATED ORAL at 11:46

## 2025-01-22 RX ADMIN — TAMSULOSIN HYDROCHLORIDE 0.4 MG: 0.4 CAPSULE ORAL at 12:02

## 2025-01-22 RX ADMIN — EZETIMIBE 10 MG: 10 TABLET ORAL at 21:08

## 2025-01-22 RX ADMIN — SODIUM CHLORIDE: 9 INJECTION, SOLUTION INTRAVENOUS at 16:25

## 2025-01-22 RX ADMIN — CARVEDILOL 12.5 MG: 12.5 TABLET, FILM COATED ORAL at 16:13

## 2025-01-22 RX ADMIN — SACUBITRIL AND VALSARTAN 1 TABLET: 24; 26 TABLET, FILM COATED ORAL at 21:08

## 2025-01-22 RX ADMIN — CARVEDILOL 12.5 MG: 12.5 TABLET, FILM COATED ORAL at 11:46

## 2025-01-22 RX ADMIN — APIXABAN 5 MG: 5 TABLET, FILM COATED ORAL at 21:08

## 2025-01-22 RX ADMIN — Medication 100 MCG: at 12:02

## 2025-01-22 RX ADMIN — SODIUM CHLORIDE, PRESERVATIVE FREE 10 ML: 5 INJECTION INTRAVENOUS at 12:02

## 2025-01-22 RX ADMIN — WATER 1000 MG: 1 INJECTION INTRAMUSCULAR; INTRAVENOUS; SUBCUTANEOUS at 22:08

## 2025-01-22 RX ADMIN — ACETAMINOPHEN 650 MG: 325 TABLET ORAL at 11:46

## 2025-01-22 RX ADMIN — AMIODARONE HYDROCHLORIDE 200 MG: 200 TABLET ORAL at 21:07

## 2025-01-22 RX ADMIN — AMIODARONE HYDROCHLORIDE 200 MG: 200 TABLET ORAL at 11:46

## 2025-01-22 RX ADMIN — ROSUVASTATIN CALCIUM 20 MG: 10 TABLET, FILM COATED ORAL at 21:08

## 2025-01-22 RX ADMIN — SODIUM CHLORIDE, PRESERVATIVE FREE 10 ML: 5 INJECTION INTRAVENOUS at 22:14

## 2025-01-22 RX ADMIN — SACUBITRIL AND VALSARTAN 1 TABLET: 24; 26 TABLET, FILM COATED ORAL at 12:02

## 2025-01-22 RX ADMIN — FAMOTIDINE 20 MG: 20 TABLET, FILM COATED ORAL at 11:46

## 2025-01-22 ASSESSMENT — PAIN DESCRIPTION - DESCRIPTORS: DESCRIPTORS: ACHING

## 2025-01-22 ASSESSMENT — PAIN SCALES - GENERAL: PAINLEVEL_OUTOF10: 2

## 2025-01-22 ASSESSMENT — PAIN DESCRIPTION - ORIENTATION: ORIENTATION: ANTERIOR

## 2025-01-22 ASSESSMENT — PAIN DESCRIPTION - LOCATION: LOCATION: HEAD

## 2025-01-22 ASSESSMENT — PAIN - FUNCTIONAL ASSESSMENT: PAIN_FUNCTIONAL_ASSESSMENT: ACTIVITIES ARE NOT PREVENTED

## 2025-01-22 NOTE — PROGRESS NOTES
0845  Pt BRIDGER to CCL.     1130  Pt returned to unit. Right radial site CDI, soft to palpation. +2 pulse. Pt denies pain, numbness, or tingling at this time. EKG obtained. Breakfast reheated.

## 2025-01-22 NOTE — PROGRESS NOTES
with laceration\".  Discussed with RN events overnight.     I have seen and examined patient at the bedside today.  Patient complained of having left knee pain which is worsening and patient was noticed to have redness of left leg with edema worsening since prior day.  Denies any chest pain no shortness of breath no fever      History Of Present Illness:       This is a 74-year-old male with past medical history significant for who presented to the emergency room for with a chief complaint of having been shocked by his pacemaker/defibrillator placed today.  Patient reports that he was taking a shower around 2:30 AM while he was in the shower getting ready for work his defibrillator shocked him once in the morning around 2:30 AM as well as once later.  Patient reports that he says happened about 4 years ago once he never had any problems with his pacemaker/defibrillator.  Patient cardiology Dr. Reilly was contacted by ED physician.  He recommended patient to be admitted and monitored.  Patient follows up with Dr. Reilly for cardiology and Dr. Reese for EP.     Patient denies any shortness of breath no palpitation orthopnea dyspnea with exertion no diaphoresis. Patient denies any nausea vomiting, no diarrhea no constipation no hematemesis no rectal bleeding no burning sensation by urination no UTI symptoms no flank pain.  Patient denies any muscle weakness paralysis or loss of sensation no blurry vision no double vision no neck stiffness.        The workup in the emergency room notable for:     Twelve-lead EKG reviewed by me shows normal sinus rhythm with heart rate of 70 bpm, AV dual paced, no acute ischemic changes     Patient has [employer] pacemaker  Pacemaker interrogation in ED revealed 2 defibrillations 41 J for V. tach  ED physician contacted Dr. Calrke who would like patient    Objective:     VITALS:   Last 24hrs VS reviewed since prior progress note. Most recent are:  Patient Vitals for the past  24 hrs:   BP Temp Temp src Pulse Resp SpO2 Weight   01/22/25 2000 99/67 -- -- 70 -- -- --   01/22/25 1950 101/64 -- -- 70 -- -- --   01/22/25 1920 95/65 -- -- 71 -- -- --   01/22/25 1900 (!) 124/53 -- -- 70 -- -- --   01/22/25 1850 101/68 -- -- 70 -- -- --   01/22/25 1815 102/70 -- -- 70 -- -- --   01/22/25 1715 (!) 83/69 -- -- 70 -- 95 % --   01/22/25 1700 96/66 -- -- 70 -- 94 % --   01/22/25 1645 99/66 -- -- 70 -- 94 % --   01/22/25 1630 (!) 89/67 -- -- 70 -- 93 % --   01/22/25 1615 (!) 89/61 -- -- 70 -- 94 % --   01/22/25 1600 (!) 103/58 -- -- 70 -- 91 % --   01/22/25 1545 91/60 -- -- 73 -- 90 % --   01/22/25 1530 99/63 -- -- 70 -- 90 % --   01/22/25 1515 95/63 98.2 °F (36.8 °C) Oral 70 18 92 % --   01/22/25 1500 (!) 85/61 -- -- 71 -- 92 % --   01/22/25 1445 112/64 -- -- 75 -- 94 % --   01/22/25 1400 -- -- -- 71 -- -- --   01/22/25 1345 108/67 98.2 °F (36.8 °C) Oral 70 18 94 % --   01/22/25 1330 103/69 -- -- 70 -- 93 % --   01/22/25 1315 110/68 -- -- 70 -- 94 % --   01/22/25 1300 -- -- -- 70 -- -- --   01/22/25 1245 130/64 -- -- 70 -- 93 % --   01/22/25 1230 128/74 -- -- 70 -- 92 % --   01/22/25 1215 (!) 147/79 -- -- 70 -- 92 % --   01/22/25 1200 (!) 142/80 -- -- 70 -- 92 % --   01/22/25 1145 133/76 -- -- 70 -- 92 % --   01/22/25 1130 (!) 123/54 97.7 °F (36.5 °C) Oral 70 16 93 % --   01/22/25 0902 -- -- -- -- -- 98 % --   01/22/25 0815 123/70 97.8 °F (36.6 °C) Oral 70 18 98 % --   01/22/25 0600 -- -- -- -- -- -- 109.5 kg (241 lb 6.5 oz)   01/22/25 0500 (!) 142/83 98 °F (36.7 °C) Oral 70 18 95 % --   01/21/25 2250 98/61 98.1 °F (36.7 °C) Oral 70 16 96 % --   01/21/25 2111 121/78 -- -- 72 -- -- --         Intake/Output Summary (Last 24 hours) at 1/22/2025 2044  Last data filed at 1/22/2025 1616  Gross per 24 hour   Intake 240 ml   Output 350 ml   Net -110 ml        I had a face to face encounter and independently examined this patient on 1/22/2025, as outlined below:  PHYSICAL EXAM:  General: Alert,

## 2025-01-22 NOTE — PROGRESS NOTES
[facility]  [address] [place] [address]  [phone]  [email or web address]  CARDIOLOGY PROGRESS NOTE    1/22/2025 11:08 AM    Admit Date: 1/21/2025    Admit Diagnosis:   Arrhythmia [I49.9]  Chest pain [R07.9]  ICD (implantable cardioverter-defibrillator) discharge [Z45.02]    Subjective:     Tyrese Arnett  was seen and examined at the bedside this morning.  The patient denies any chest pain or other complaints.    /70   Pulse 70   Temp 98 °F (36.7 °C) (Oral)   Resp 18   Ht 1.727 m (5' 7.99\")   Wt 109.5 kg (241 lb 6.5 oz)   SpO2 98%   BMI 36.71 kg/m²     Current Facility-Administered Medications   Medication Dose Route Frequency    fentaNYL (SUBLIMAZE) injection    PRN    midazolam (VERSED) injection    PRN    aspirin chewable tablet    PRN    lidocaine 1 % injection    PRN    verapamil (ISOPTIN) injection    PRN    nitroGLYCERIN 200 mcg    PRN    heparin (porcine) injection    PRN    iopamidol (ISOVUE-370) 76 % injection    PRN    amiodarone (CORDARONE) tablet 200 mg  200 mg Oral BID    apixaban (ELIQUIS) tablet 5 mg  5 mg Oral BID    carvedilol (COREG) tablet 12.5 mg  12.5 mg Oral BID WC    vitamin B-12 (CYANOCOBALAMIN) tablet 100 mcg  100 mcg Oral Daily    empagliflozin (JARDIANCE) tablet 10 mg  10 mg Oral Daily    ezetimibe (ZETIA) tablet 10 mg  10 mg Oral Nightly    rosuvastatin (CRESTOR) tablet 20 mg  20 mg Oral Nightly    sacubitril-valsartan (ENTRESTO) 24-26 MG per tablet 1 tablet  1 tablet Oral BID    tamsulosin (FLOMAX) capsule 0.4 mg  0.4 mg Oral Daily    sodium chloride flush 0.9 % injection 5-40 mL  5-40 mL IntraVENous 2 times per day    sodium chloride flush 0.9 % injection 5-40 mL  5-40 mL IntraVENous PRN    0.9 % sodium chloride infusion   IntraVENous PRN    potassium chloride (KLOR-CON M) extended release tablet 40 mEq  40 mEq Oral PRN    Or    potassium bicarb-citric acid (EFFER-K) effervescent tablet 40 mEq  40 mEq Oral PRN     necessitates coronary CT angiogram (this should be done as an outpatient).    Recommend:    1.  Okay for discharge in 6 hours/5 PM today  2.  Discontinue home aspirin  3.  Resume home apixaban  4.  Start new clopidogrel 75 mg daily for 12 months    Will need cardiac rehab    Otherwise, continue carvedilol, amiodarone and sacubitril-valsartan for the patient's cardiomyopathy    Thank you for allowing us to participate in the care of this patient.  Feel free to reach back out if we could be of any further assistance.    Gauri Kothari MD

## 2025-01-22 NOTE — PROGRESS NOTES
End of Shift Note    Bedside shift change report given to Mary DUNHAM (oncoming nurse) by Bonnie Sutherland RN (offgoing nurse).  Report included the following information SBAR    Shift worked:  8805-1385     Shift summary and any significant changes:     [facility] completed. Right radial site CDI, soft to palpation. Dr. Snow to see patient for left leg.      Concerns for physician to address:  Redness and swelling to left leg post fall on metal steps one week ago.      Zone phone for oncoming shift:   N/A       Activity:  Level of Assistance: Independent  Number times ambulated in hallways past shift: 0  Number of times OOB to chair past shift: 2    Cardiac:   Cardiac Monitoring: Yes      Cardiac Rhythm: AV paced    Access:  Current line(s): PIV     Genitourinary:        Respiratory:   O2 Device: None (Room air)  Chronic home O2 use?: NO  Incentive spirometer at bedside: NO    GI:     Current diet:  ADULT DIET; Regular; 4 carb choices (60 gm/meal); Low Fat/Low Chol/High Fiber/2 gm Na  Passing flatus: YES    Pain Management:   Patient states pain is manageable on current regimen: YES    Skin:  Javed Scale Score: 21  Interventions: Wound Offloading (Prevention Methods): Repositioning, Pillows    Patient Safety:  Fall Risk: Nursing Judgement-Fall Risk High(Add Comments): No  Fall Risk Interventions  Nursing Judgement-Fall Risk High(Add Comments): No  Toilet Every 2 Hours-In Advance of Need: Yes  Hourly Visual Checks: Awake, In chair  Fall Visual Posted: Socks  Room Door Open: Yes  Alarm On: Bed  Patient Moved Closer to Nursing Station: No    Active Consults:   IP CONSULT TO CARDIOLOGY  IP CONSULT TO CARDIAC REHAB  IP CONSULT TO CARDIAC REHAB    Length of Stay:  Expected LOS: 2  Actual LOS: 1    Bonnie Sutherland, ROLY

## 2025-01-22 NOTE — PLAN OF CARE
Predictive Model Details          22 (Normal)  Factor Value    Calculated 1/22/2025 11:40 61% Age 74 years old    Deterioration Index Model 20% Cardiac rhythm AV paced     5% Pulse oximetry 93 %     5% Potassium 4.2 mmol/L     4% Systolic 123     4% Sodium 142 mmol/L     0% Temperature 97.7 °F (36.5 °C)     0% Respiratory rate 16     0% Pulse 70     0% WBC count 6.3 K/uL     0% Hematocrit 39.5 %        Problem: Chronic Conditions and Co-morbidities  Goal: Patient's chronic conditions and co-morbidity symptoms are monitored and maintained or improved  1/22/2025 1140 by Bonnie Sutherland RN  Outcome: Progressing  1/21/2025 2354 by Savannah Chinchilla RN  Outcome: Progressing     Problem: Discharge Planning  Goal: Discharge to home or other facility with appropriate resources  1/22/2025 1140 by Bonnie Sutherland RN  Outcome: Progressing  1/21/2025 2354 by Savannah Chinchilla RN  Outcome: Progressing     Problem: Pain  Goal: Verbalizes/displays adequate comfort level or baseline comfort level  1/22/2025 1140 by Bonnie Sutherland RN  Outcome: Progressing  1/21/2025 2354 by Savannah Chinchilla RN  Outcome: Progressing     Problem: Safety - Adult  Goal: Free from fall injury  1/22/2025 1140 by Bonnie Sutherland RN  Outcome: Progressing  1/21/2025 2354 by Savannah Chinchilla RN  Outcome: Progressing

## 2025-01-22 NOTE — PROGRESS NOTES
Physical Therapy Note    Orders acknowledged. Chart reviewed. Attempted to see patient for PT evaluation, however patient currently BRIDGER for cardiac cath. Will defer and continue to follow.    Thank you,  Myra Cook, PT, DPT    10:30 AM: Patient remains BRIDGER in CCL. Will continue to defer.

## 2025-01-22 NOTE — CARDIO/PULMONARY
Chart reviewed: Patient is 74 y.o. male admitted with Arrhythmia [I49.9]  Chest pain [R07.9]  ICD (implantable cardioverter-defibrillator) discharge [Z45.02]    S/p PCI/RICHARD on 1/22/25.  LVEF 20-25%  Education: CAD education folder given to Tyrese Arnett Jr.    HF book also given and reviewed.    Educated using teach back method. Reviewed CAD diagnosis definition and purpose of intervention. Discussed risk factors for CAD to include the following: family history, elevated BMI, hyperlipidemia, hypertension, diabetes, stress, and smoking. Discussed Heart Healthy/Low Sodium (2000 mg) diet. Reviewed the importance of medication compliance, the purpose of Plavix, and potential side effects. Discussed follow up appointments with cardiologist, signs and symptoms of angina, and what to report to physician after discharge.  Emphasized the value of cardiac rehab. Discussed Cardiac Rehab Program format, benefits, and encouraged enrollment to assist with risk modification and management. Initial Cardiac Rehab Program intake appointment scheduled for 2/10/25 @  0900 and appointment information is on the AVS.    Tyrese Arnett Jr verbalized understanding with questions answered.     KYE YARBROUGH RN

## 2025-01-22 NOTE — PLAN OF CARE
End of Shift Note    Bedside shift change report given to *** (oncoming nurse) by Savannah Chinchilla RN (offgoing nurse).  Report included the following information {SBAR REPORTS LIST:06662}    Shift worked:  ***     Shift summary and any significant changes:     ***     Concerns for physician to address:  ***     Zone phone for oncoming shift:   ***       Activity:  Level of Assistance: Independent  Number times ambulated in hallways past shift: {Numbers; 0-5:215472}  Number of times OOB to chair past shift: {Numbers; 0-5:368811}    Cardiac:   Cardiac Monitoring: {YES/NO:63523}      Cardiac Rhythm: AV paced    Access:  Current line(s): {Line choices:01382}    Genitourinary:        Respiratory:   O2 Device: None (Room air)  Chronic home O2 use?: {YES/NO/NA:98406}  Incentive spirometer at bedside: {YES/NO/NA:09316}    GI:     Current diet:  ADULT DIET; Regular; No Caffeine  Passing flatus: {YES/NO:34329}    Pain Management:   Patient states pain is manageable on current regimen: {YES/NO/NA:57479}    Skin:  Javed Scale Score: 20  Interventions: Wound Offloading (Prevention Methods): Bed, pressure reduction mattress    Patient Safety:  Fall Risk: Nursing Judgement-Fall Risk High(Add Comments): No  Fall Risk Interventions  Nursing Judgement-Fall Risk High(Add Comments): No  Toilet Every 2 Hours-In Advance of Need: Yes  Hourly Visual Checks: Awake, In bed  Fall Visual Posted: Socks  Room Door Open: Yes  Alarm On: Bed  Patient Moved Closer to Nursing Station: Yes    Active Consults:   IP CONSULT TO CARDIOLOGY    Length of Stay:  Expected LOS: 2  Actual LOS: 0    Savannah Chinchilla RN                          Predictive Model Details          23 (Normal)  Factor Value    Calculated 1/21/2025 23:55 55% Age 74 years old    Deterioration Index Model 18% Cardiac rhythm AV paced     18% Systolic 98     7% Potassium 4.3 mmol/L     2% Sodium 139 mmol/L     0% Hematocrit 43.1 %     0% Pulse oximetry 96 %     0% Temperature 98.1 °F (36.7

## 2025-01-23 LAB
ANION GAP SERPL CALC-SCNC: 6 MMOL/L (ref 2–12)
BUN SERPL-MCNC: 19 MG/DL (ref 6–20)
BUN/CREAT SERPL: 13 (ref 12–20)
CALCIUM SERPL-MCNC: 8.7 MG/DL (ref 8.5–10.1)
CHLORIDE SERPL-SCNC: 110 MMOL/L (ref 97–108)
CO2 SERPL-SCNC: 24 MMOL/L (ref 21–32)
CREAT SERPL-MCNC: 1.41 MG/DL (ref 0.7–1.3)
EKG ATRIAL RATE: 70 BPM
EKG DIAGNOSIS: NORMAL
EKG P-R INTERVAL: 274 MS
EKG Q-T INTERVAL: 408 MS
EKG QRS DURATION: 128 MS
EKG QTC CALCULATION (BAZETT): 440 MS
EKG R AXIS: -85 DEGREES
EKG T AXIS: 109 DEGREES
EKG VENTRICULAR RATE: 70 BPM
ERYTHROCYTE [DISTWIDTH] IN BLOOD BY AUTOMATED COUNT: 13.2 % (ref 11.5–14.5)
GLUCOSE SERPL-MCNC: 98 MG/DL (ref 65–100)
HCT VFR BLD AUTO: 35.9 % (ref 36.6–50.3)
HGB BLD-MCNC: 12.1 G/DL (ref 12.1–17)
MCH RBC QN AUTO: 31.8 PG (ref 26–34)
MCHC RBC AUTO-ENTMCNC: 33.7 G/DL (ref 30–36.5)
MCV RBC AUTO: 94.5 FL (ref 80–99)
NRBC # BLD: 0 K/UL (ref 0–0.01)
NRBC BLD-RTO: 0 PER 100 WBC
PLATELET # BLD AUTO: 146 K/UL (ref 150–400)
PMV BLD AUTO: 10 FL (ref 8.9–12.9)
POTASSIUM SERPL-SCNC: 4.1 MMOL/L (ref 3.5–5.1)
RBC # BLD AUTO: 3.8 M/UL (ref 4.1–5.7)
SODIUM SERPL-SCNC: 140 MMOL/L (ref 136–145)
WBC # BLD AUTO: 5.4 K/UL (ref 4.1–11.1)

## 2025-01-23 PROCEDURE — 6370000000 HC RX 637 (ALT 250 FOR IP): Performed by: INTERNAL MEDICINE

## 2025-01-23 PROCEDURE — 97116 GAIT TRAINING THERAPY: CPT

## 2025-01-23 PROCEDURE — 85027 COMPLETE CBC AUTOMATED: CPT

## 2025-01-23 PROCEDURE — 97161 PT EVAL LOW COMPLEX 20 MIN: CPT

## 2025-01-23 PROCEDURE — 1100000003 HC PRIVATE W/ TELEMETRY

## 2025-01-23 PROCEDURE — 2500000003 HC RX 250 WO HCPCS: Performed by: INTERNAL MEDICINE

## 2025-01-23 PROCEDURE — 6370000000 HC RX 637 (ALT 250 FOR IP): Performed by: STUDENT IN AN ORGANIZED HEALTH CARE EDUCATION/TRAINING PROGRAM

## 2025-01-23 PROCEDURE — 6360000002 HC RX W HCPCS: Performed by: INTERNAL MEDICINE

## 2025-01-23 PROCEDURE — 87040 BLOOD CULTURE FOR BACTERIA: CPT

## 2025-01-23 PROCEDURE — 99222 1ST HOSP IP/OBS MODERATE 55: CPT | Performed by: ORTHOPAEDIC SURGERY

## 2025-01-23 PROCEDURE — 2500000003 HC RX 250 WO HCPCS: Performed by: STUDENT IN AN ORGANIZED HEALTH CARE EDUCATION/TRAINING PROGRAM

## 2025-01-23 PROCEDURE — 80048 BASIC METABOLIC PNL TOTAL CA: CPT

## 2025-01-23 PROCEDURE — 36415 COLL VENOUS BLD VENIPUNCTURE: CPT

## 2025-01-23 RX ADMIN — EZETIMIBE 10 MG: 10 TABLET ORAL at 20:08

## 2025-01-23 RX ADMIN — CARVEDILOL 12.5 MG: 12.5 TABLET, FILM COATED ORAL at 09:18

## 2025-01-23 RX ADMIN — SACUBITRIL AND VALSARTAN 1 TABLET: 24; 26 TABLET, FILM COATED ORAL at 09:18

## 2025-01-23 RX ADMIN — FAMOTIDINE 20 MG: 20 TABLET, FILM COATED ORAL at 09:18

## 2025-01-23 RX ADMIN — APIXABAN 5 MG: 5 TABLET, FILM COATED ORAL at 09:18

## 2025-01-23 RX ADMIN — APIXABAN 5 MG: 5 TABLET, FILM COATED ORAL at 20:06

## 2025-01-23 RX ADMIN — SODIUM CHLORIDE, PRESERVATIVE FREE 10 ML: 5 INJECTION INTRAVENOUS at 09:26

## 2025-01-23 RX ADMIN — AMIODARONE HYDROCHLORIDE 200 MG: 200 TABLET ORAL at 20:07

## 2025-01-23 RX ADMIN — TAMSULOSIN HYDROCHLORIDE 0.4 MG: 0.4 CAPSULE ORAL at 09:18

## 2025-01-23 RX ADMIN — SODIUM CHLORIDE, PRESERVATIVE FREE 10 ML: 5 INJECTION INTRAVENOUS at 20:13

## 2025-01-23 RX ADMIN — EMPAGLIFLOZIN 10 MG: 10 TABLET, FILM COATED ORAL at 09:18

## 2025-01-23 RX ADMIN — SODIUM CHLORIDE, PRESERVATIVE FREE 10 ML: 5 INJECTION INTRAVENOUS at 20:18

## 2025-01-23 RX ADMIN — SACUBITRIL AND VALSARTAN 1 TABLET: 24; 26 TABLET, FILM COATED ORAL at 20:08

## 2025-01-23 RX ADMIN — Medication 100 MCG: at 09:19

## 2025-01-23 RX ADMIN — WATER 1000 MG: 1 INJECTION INTRAMUSCULAR; INTRAVENOUS; SUBCUTANEOUS at 20:10

## 2025-01-23 RX ADMIN — AMIODARONE HYDROCHLORIDE 200 MG: 200 TABLET ORAL at 09:18

## 2025-01-23 RX ADMIN — CARVEDILOL 12.5 MG: 12.5 TABLET, FILM COATED ORAL at 16:44

## 2025-01-23 RX ADMIN — CLOPIDOGREL BISULFATE 75 MG: 75 TABLET ORAL at 09:18

## 2025-01-23 RX ADMIN — ROSUVASTATIN CALCIUM 20 MG: 10 TABLET, FILM COATED ORAL at 20:07

## 2025-01-23 NOTE — CONSULTS
ORTHOPAEDIC CONSULT NOTE    Subjective:     Date of Consultation:  January 23, 2025      Tyrese Arnett Jr is a 74 y.o. male who is being seen for left knee injury with wound over the patella concern for hematoma versus infectious hematoma.  Patient admitted 2 days ago for cardiac concerns.  Our services were consulted today to evaluation for his knee.  Patient states that he fell last Monday resulting in abrasion over his left knee.  He states since that time he has had increased swelling and pain in the area as well as discoloration of his extremity.  Patient does state that today his symptoms are improving from what they were a few days ago.  He does endorse some pain with range of motion or ambulating.  He denies any drainage from the wound.  He denies any other areas of focal pain.  He denies any other complaints at this time.    Patient Active Problem List    Diagnosis Date Noted    Arrhythmia 01/21/2025    Chronic renal disease, stage III ([facility]) 05/31/2022    VF (ventricular fibrillation) 08/10/2018    VT (ventricular tachycardia) ([facility]) 08/10/2018    A-fib ([facility]) 08/10/2018    Presence of biventricular AICD 08/10/2018    Systolic CHF, chronic ([facility]) 08/10/2018    Chronic diastolic HF (heart failure) ([facility]) 08/10/2018    Abnormal stress ECG 08/10/2018    S/P coronary artery stent placement 08/10/2018    Severe obesity (BMI 35.0-39.9) with comorbidity 04/19/2018    Low back pain 07/02/2013    Automatic implantable cardioverter-defibrillator in situ 06/11/2012    Hematochezia 05/14/2012    GERD (gastroesophageal reflux disease) 05/14/2012    Screen for colon cancer 05/14/2012    Nocturia 02/16/2011    Encounter for long-term (current) use of other medications 02/16/2011    Abnormal PSA 02/16/2011    Mixed hyperlipidemia 02/16/2011    Essential hypertension, benign 02/16/2011    CAD (coronary artery disease) 02/16/2011    Cardiomyopathy (HCC) 02/16/2011     Family History   Problem Relation Age of Onset    Lung Disease  infectious, would expect more difficulty with range of motion and ambulating.  -If patient discharges, would recommend close follow-up to make sure symptoms are not worsening.  Discussed with patient that should his leg worsen after discharge that he should return to the emergency department or be seen in the office for further evaluation.  -No plans for surgical intervention from orthopedic standpoint.  Will be available as needed.  Please contact us with any questions or concerns.      Dr. Argueta aware and agrees with plan as above.        BART Garcia  [facility]

## 2025-01-23 NOTE — PROGRESS NOTES
End of Shift Note    Bedside shift change report given to ROLY Morales (oncoming nurse) by Chaparro Patel RN (offgoing nurse).  Report included the following information SBAR, ED Summary, Procedure Summary, Intake/Output, MAR, Recent Results, Cardiac Rhythm AV-PACED, Quality Measures, and Dual Neuro Assessment    Shift worked:  7:00 PM-7:30 AM     Shift summary and any significant changes:     Patient had an uneventful shift. Attending MD came to see the patient at the beginning of my shift for the patient's left knee being swollen and bruised from tripping up metal stairs. MD put in multiple orders. Blood Cultures completed, but still in progress. I gave the patient the first dose of IV Push Antibiotics ordered. Vascular Duplex was completed at the bedside. Patient has been stable from a cardiac standpoint since receiving the 1 stent on 1/22. Patient's labs were drawn and resulted. Patient's Creatinine was 1.41.    Concerns for physician to address:  Left Knee issues; Creatinine level    Zone phone for oncoming shift:   N/A       Activity:     Number times ambulated in hallways past shift: 0  Number of times OOB to chair past shift: 2    Cardiac:   Cardiac Monitoring: Yes           Access:  Current line(s): PIV     Genitourinary:   Urinary status: voiding    Respiratory:      Chronic home O2 use?: NO  Incentive spirometer at bedside: NO       GI:     Current diet:  ADULT DIET; Regular; 4 carb choices (60 gm/meal); Low Fat/Low Chol/High Fiber/2 gm Na  Passing flatus: YES  Tolerating current diet: YES       Pain Management:   Patient states pain is manageable on current regimen: YES    Skin:     Interventions: float heels, increase time out of bed, and nutritional support    Patient Safety:  Fall Score:    Interventions: gripper socks, pt to call before getting OOB, and stay with me (per policy)       Length of Stay:  Expected LOS: 2  Actual LOS: 2      Chaparro Patel, RN

## 2025-01-23 NOTE — PLAN OF CARE
Problem: Chronic Conditions and Co-morbidities  Goal: Patient's chronic conditions and co-morbidity symptoms are monitored and maintained or improved  1/22/2025 2150 by Chaparro Patel RN  Outcome: Progressing  1/22/2025 1140 by Bonnie Sutherland RN  Outcome: Progressing     Problem: Discharge Planning  Goal: Discharge to home or other facility with appropriate resources  1/22/2025 2150 by Chaparro Patel RN  Outcome: Progressing  1/22/2025 1140 by Bonnie Sutherland RN  Outcome: Progressing     Problem: Pain  Goal: Verbalizes/displays adequate comfort level or baseline comfort level  1/22/2025 2150 by Chaparro Patel RN  Outcome: Progressing  1/22/2025 1140 by Bonnie Sutherland RN  Outcome: Progressing     Problem: Safety - Adult  Goal: Free from fall injury  1/22/2025 2150 by Chaparro Patel RN  Outcome: Progressing  1/22/2025 1140 by Bonnie Sutherland RN  Outcome: Progressing

## 2025-01-23 NOTE — PLAN OF CARE
Problem: Chronic Conditions and Co-morbidities  Goal: Patient's chronic conditions and co-morbidity symptoms are monitored and maintained or improved  1/23/2025 0753 by Carmen Galarza RN  Outcome: Progressing  Flowsheets (Taken 1/23/2025 0725)  Care Plan - Patient's Chronic Conditions and Co-Morbidity Symptoms are Monitored and Maintained or Improved: Monitor and assess patient's chronic conditions and comorbid symptoms for stability, deterioration, or improvement  1/22/2025 2150 by Chaparro Patel RN  Outcome: Progressing     Problem: Discharge Planning  Goal: Discharge to home or other facility with appropriate resources  1/23/2025 0753 by Carmen Galarza RN  Outcome: Progressing  Flowsheets (Taken 1/23/2025 0725)  Discharge to home or other facility with appropriate resources: Identify barriers to discharge with patient and caregiver  1/22/2025 2150 by Chaparro Patel RN  Outcome: Progressing     Problem: Pain  Goal: Verbalizes/displays adequate comfort level or baseline comfort level  1/23/2025 0753 by Carmen Galarza RN  Outcome: Progressing  1/22/2025 2150 by Chaparro Patel RN  Outcome: Progressing     Problem: Safety - Adult  Goal: Free from fall injury  1/23/2025 0753 by Carmen Galarza RN  Outcome: Progressing  1/22/2025 2150 by Chaparro Patel RN  Outcome: Progressing     Problem: ABCDS Injury Assessment  Goal: Absence of physical injury  Outcome: Progressing

## 2025-01-23 NOTE — PROGRESS NOTES
PCP Hospital follow-up transitional care appointment has been scheduled with Dr. Steve Rice on 2/5/25 8410. This is the first available appt due to limited provider availability. PCP office does not offer alternate provider option for hospital follow up. [facility] information on AVS for patient resource. Pending patient discharge.

## 2025-01-23 NOTE — PROGRESS NOTES
Predictive Model Details          22 (Normal)  Factor Value    Calculated 1/23/2025 07:53 59% Age 74 years old    Deterioration Index Model 19% Cardiac rhythm AV paced     11% Respiratory rate 18     4% Systolic 125     2% Platelet count abnormal (146 K/uL)     2% Potassium 4.1 mmol/L     1% Hematocrit abnormal (35.9 %)     0% Sodium 140 mmol/L     0% Temperature 97.9 °F (36.6 °C)     0% WBC count 5.4 K/uL     0% Pulse oximetry 95 %     0% Pulse 70      8:30 AM Consult called to ID via [employer].     11:59 AM Patient up with PT. Patient ambulated well, up in the chair. Hypotensive after walking, no sx. BP resolved. Patient educated to be aware of dizziness/lightheadedness and communicate with staff when walking in the room.     5:19 PM Wound care at bedside.     End of Shift Note    Bedside shift change report given to ROLY Hernandez (oncoming nurse) by Carmen Galarza RN (offgoing nurse).  Report included the following information SBAR    Shift worked:  7A-7P     Shift summary and any significant changes:     ID consult called   Wound care complete    Plan to switch to PO ABX tomorrow.      Concerns for physician to address:  ABX, D/C     Zone phone for oncoming shift:   [phone]       Activity:  Level of Assistance: Independent  Number times ambulated in hallways past shift: 1  Number of times OOB to chair past shift: 5    Cardiac:   Cardiac Monitoring: Yes      Cardiac Rhythm: AV paced    Access:  Current line(s): PIV     Genitourinary:        Respiratory:   O2 Device: None (Room air)  Chronic home O2 use?: NO  Incentive spirometer at bedside: NO    GI:     Current diet:  ADULT DIET; Regular; 4 carb choices (60 gm/meal); Low Fat/Low Chol/High Fiber/2 gm Na  Passing flatus: YES    Pain Management:   Patient states pain is manageable on current regimen: YES    Skin:  Javed Scale Score: 20  Interventions: Wound Offloading (Prevention Methods): Repositioning    Patient Safety:  Fall Risk: Nursing Judgement-Fall Risk  High(Add Comments): No  Fall Risk Interventions  Nursing Judgement-Fall Risk High(Add Comments): No  Toilet Every 2 Hours-In Advance of Need: Yes  Hourly Visual Checks: Awake, In bed  Fall Visual Posted: Armband  Room Door Open: Yes  Alarm On: Bed, Chair  Patient Moved Closer to Nursing Station: Yes    Active Consults:   IP CONSULT TO CARDIOLOGY  IP CONSULT TO CARDIAC REHAB  IP CONSULT TO CARDIAC REHAB  IP CONSULT TO ORTHOPEDIC SURGERY  IP CONSULT TO INFECTIOUS DISEASES    Length of Stay:  Expected LOS: 3  Actual LOS: 2    Carmen Galarza RN

## 2025-01-23 NOTE — PROGRESS NOTES
Spiritual Care Partner Volunteer visited patient at [facility] in [facility] 2 INTRVNTNL CARDIO on 1/23/2025   Documented by:    JOSUE Longo, [facility]   Paging Service [phone])

## 2025-01-23 NOTE — WOUND CARE
Wound care consult for the left knee wound that is present on admission.  Chart reviewed and patient assessed.   Pt. Is 74 years old and he is admitted due to Chest / heart arrhythmia and he got shocked at home with his defib. He also fell on Monday last week and has this abrasion.      Past Medical History:   Diagnosis Date    A-fib ([facility]) 8/10/2018    Abnormal PSA 2/16/2011    Abnormal stress ECG 8/10/2018    CAD (coronary artery disease) 2/16/2011    Cancer ([facility])     Cardiomyopathy ([facility]) 2/16/2011    Chronic diastolic HF (heart failure) ([facility]) 8/10/2018    Encounter for long-term (current) use of other medications 2/16/2011    Esophageal reflux 2/16/2011    Essential hypertension, benign 2/16/2011    GERD (gastroesophageal reflux disease) 5/14/2012    Heart attack ([facility])     Long term current use of anticoagulant therapy     Low back pain 7/2/2013    Mixed hyperlipidemia 2/16/2011    Nocturia 2/16/2011    Pacemaker     Presence of biventricular AICD 8/10/2018    [employer] BIVICD implanted 5/2017    S/P coronary artery stent placement 8/10/2018    8/9/18 PCI/JAYME to LAD, RCA x 2    Systolic CHF, chronic ([facility]) 8/10/2018    VF (ventricular fibrillation) 8/10/2018    VT (ventricular tachycardia) ([facility]) 8/10/2018     Past Surgical History:   Procedure Laterality Date    CARDIAC CATHETERIZATION      CARDIAC PROCEDURE N/A 1/22/2025    Left heart cath / coronary angiography performed by Gauri Kothari MD at [facility] CARDIAC CATH LAB    CARDIAC PROCEDURE N/A 1/22/2025    Intravascular ultrasound performed by Gauri Kothari MD at [facility] CARDIAC CATH LAB    CARDIAC PROCEDURE N/A 1/22/2025    Percutaneous coronary intervention performed by Gauri Kothari MD at [facility] CARDIAC CATH LAB    CARDIAC PROCEDURE N/A 1/22/2025    Intravascular lithotripsy coronary performed by Gauri Kothari MD at [facility] CARDIAC CATH LAB    CARDIAC PROCEDURE N/A 1/22/2025    Insert stent jayme coronary performed by Gauri Kothari MD at

## 2025-01-24 VITALS
BODY MASS INDEX: 36.59 KG/M2 | TEMPERATURE: 97.1 F | WEIGHT: 241.4 LBS | HEART RATE: 70 BPM | DIASTOLIC BLOOD PRESSURE: 61 MMHG | HEIGHT: 68 IN | SYSTOLIC BLOOD PRESSURE: 102 MMHG | RESPIRATION RATE: 20 BRPM | OXYGEN SATURATION: 92 %

## 2025-01-24 PROCEDURE — 6370000000 HC RX 637 (ALT 250 FOR IP): Performed by: INTERNAL MEDICINE

## 2025-01-24 PROCEDURE — 99223 1ST HOSP IP/OBS HIGH 75: CPT | Performed by: INTERNAL MEDICINE

## 2025-01-24 PROCEDURE — 2500000003 HC RX 250 WO HCPCS: Performed by: STUDENT IN AN ORGANIZED HEALTH CARE EDUCATION/TRAINING PROGRAM

## 2025-01-24 PROCEDURE — 6370000000 HC RX 637 (ALT 250 FOR IP): Performed by: STUDENT IN AN ORGANIZED HEALTH CARE EDUCATION/TRAINING PROGRAM

## 2025-01-24 RX ORDER — CEPHALEXIN 500 MG/1
500 CAPSULE ORAL 3 TIMES DAILY
Qty: 30 CAPSULE | Refills: 0 | Status: SHIPPED | OUTPATIENT
Start: 2025-01-24

## 2025-01-24 RX ORDER — AMIODARONE HYDROCHLORIDE 200 MG/1
200 TABLET ORAL 2 TIMES DAILY
Qty: 60 TABLET | Refills: 1 | Status: SHIPPED | OUTPATIENT
Start: 2025-01-24

## 2025-01-24 RX ADMIN — SACUBITRIL AND VALSARTAN 1 TABLET: 24; 26 TABLET, FILM COATED ORAL at 08:52

## 2025-01-24 RX ADMIN — FAMOTIDINE 20 MG: 20 TABLET, FILM COATED ORAL at 08:52

## 2025-01-24 RX ADMIN — AMIODARONE HYDROCHLORIDE 200 MG: 200 TABLET ORAL at 08:52

## 2025-01-24 RX ADMIN — EMPAGLIFLOZIN 10 MG: 10 TABLET, FILM COATED ORAL at 08:52

## 2025-01-24 RX ADMIN — Medication 100 MCG: at 08:52

## 2025-01-24 RX ADMIN — SODIUM CHLORIDE, PRESERVATIVE FREE 10 ML: 5 INJECTION INTRAVENOUS at 08:52

## 2025-01-24 RX ADMIN — TAMSULOSIN HYDROCHLORIDE 0.4 MG: 0.4 CAPSULE ORAL at 08:52

## 2025-01-24 RX ADMIN — CLOPIDOGREL BISULFATE 75 MG: 75 TABLET ORAL at 08:52

## 2025-01-24 RX ADMIN — CARVEDILOL 12.5 MG: 12.5 TABLET, FILM COATED ORAL at 08:52

## 2025-01-24 RX ADMIN — APIXABAN 5 MG: 5 TABLET, FILM COATED ORAL at 08:52

## 2025-01-24 NOTE — DISCHARGE INSTR - LAB
Patient has been hospitalized from 1/21/2025 to 1/24/2025.  Patient may return to work on 02/1/2025 if stable.

## 2025-01-24 NOTE — DISCHARGE SUMMARY
Hospital Medicine Discharge Summary    Patient: Tyrese Arnett Jr   : 1950     Admit Date: 2025   Discharge Date:   ***  Disposition:  []Home   []HHC  []SNF  []ECF  []Acute Rehab  []LTAC  []Hospice  Code status:  []Full  []DNR/CCA  []Limited (DNR/CCA with Do Not Intubate)  []DNRCC  Condition at Discharge: Stable  Primary Care Provider: Steve Rice MD    Admitting Provider: Zoe Snow MD  Discharge Provider: Zoe Snow MD     Discharge Diagnoses:      Active Hospital Problems    Diagnosis     Arrhythmia [I49.9]        Presenting Admission History:      History Of Present Illness:       This is a 74-year-old male with past medical history significant for who presented to the emergency room for with a chief complaint of having been shocked by his pacemaker/defibrillator placed today.  Patient reports that he was taking a shower around 2:30 AM while he was in the shower getting ready for work his defibrillator shocked him once in the morning around 2:30 AM as well as once later.  Patient reports that he says happened about 4 years ago once he never had any problems with his pacemaker/defibrillator.  Patient cardiology Dr. Reilly was contacted by ED physician.  He recommended patient to be admitted and monitored.  Patient follows up with Dr. Reilly for cardiology and Dr. Reese for EP.     Patient denies any shortness of breath no palpitation orthopnea dyspnea with exertion no diaphoresis. Patient denies any nausea vomiting, no diarrhea no constipation no hematemesis no rectal bleeding no burning sensation by urination no UTI symptoms no flank pain.  Patient denies any muscle weakness paralysis or loss of sensation no blurry vision no double vision no neck stiffness.        The workup in the emergency room notable for:     Twelve-lead EKG reviewed by me shows normal sinus rhythm with heart rate of 70 bpm, AV dual paced, no acute ischemic changes     Patient has [place]

## 2025-01-24 NOTE — PROGRESS NOTES
Occupational Therapy note:    Chart reviewed and cleared for therapy by nursing. Patient received sitting in recliner chair and reported possibly discharging later today. He reported no concerns with completing ADLs and stated he has been completing them without assist. He reported no concerns with returning home and in agreement for no additional acute OT needs. Will complete order and sign off as patient is at his functional baseline.    Irena Briscoe, OTR/L, OTD

## 2025-01-24 NOTE — DISCHARGE INSTR - ACTIVITY
Patient has been hospitalized from 1/21/2025 to 1/24/2025.  Patient wmay return to work on 02/1/2025 if stable.

## 2025-01-24 NOTE — PROGRESS NOTES
Hospitalist Progress Note    NAME:   Tyrese Arnett Jr   : 1950   MRN: [id number]     Date/Time: 2025 10:15 PM  Patient PCP: Steve Rice MD    Estimated discharge date:  Barriers: Left knee pain hematoma injury concern for wound infection      Assessment / Plan:    Status post V-fib x 2 with defibrillator shock  Chest pain  ICD implantable cardioverter defibrillator discharge  Essential hypertension  Systolic CHF, chronic  Presence of automatic implantable biventricular ICD  Dilated cardiomyopathy, ejection fraction 25%  Paroxysmal atrial fibrillation on anticoagulation therapy with Eliquis  Hyperlipidemia  History of sick sinus syndrome  Cellulitis left lower extremity with left knee pain  Status post left knee laceration 1 week ago    Left knee pain/swelling  Cellulitis left knee/left lower extremity  Left lower extremity edema  Concern for infected wound/hematoma     as discussed with patient  We will order Doppler ultrasound of left lower extremity to rule out DVT  X-ray of left knee reveals soft tissue swelling  Orthopedic surgery consult    Patient has been seen and evaluated by cardiology consult  Cardiology recommended patient can be discharged when stable  Discontinue home aspirin  Home Rx Paxil 1 to be resumed  Start new clopidogrel 75 mg for 12 months    After obtaining blood cultures patient will be started on IV ceftriaxone  Infectious disease consult requested    Follow-up with sensitivity identification      As per orthopedic surgery consult evaluation  Patient is febrile WBC 5.4 today tolerating full range of motion and able to ambulate further to the room active drainage present or obvious abscess on exam  Patient on IV antibiotics  If remaining inpatient should continue IV antibiotics for now if PICC line is to discharge patient could go on oral antibiotics as this does not appear to be obviously infected  Patient will need to follow-up with orthopedic surgery  No plans  H&P    Procedures: see electronic medical records for all procedures/Xrays and details which were not copied into this note but were reviewed prior to creation of Plan.      LABS:  I reviewed today's most current labs and imaging studies.  Pertinent labs include:  Recent Labs     01/21/25 0359 01/22/25 0452 01/23/25 0129   WBC 6.0 6.3 5.4   HGB 14.3 12.8 12.1   HCT 43.1 39.5 35.9*    152 146*     Recent Labs     01/21/25 0359 01/21/25  0827 01/22/25 0452 01/23/25  0129    139 142 140   K 4.1 4.3 4.2 4.1    108 111* 110*   CO2 24 24 24 24   GLUCOSE 122* 98 104* 98   BUN 20 18 18 19   CREATININE 1.85* 1.57* 1.30 1.41*   CALCIUM 9.3 8.6 8.4* 8.7   MG 2.1  --   --   --        Signed: Zoe Snow MD

## 2025-01-24 NOTE — CONSULTS
Infectious Disease Consult    Date of Consultation:  January 24, 2025  Reason for Consultation:infected knee / traumatic injury  Referring Physician: Dr Lechuga  Date of Admission:1/21/2025      Impression    Left knee abrasion & scab formation  Cellulitis of Left knee  Resolving.    S/p vfib & defibrillator shock  ICD discharge    Dilated cardiomyopathy  EF 25  Systolic CHF, chronic stable.    Paroxysmal atrial fibrillation  On anticoagulation.    H/o sick sinus syndrome     Obesity   BMI 36.71    Plan  Continue ceftriaxone IV  May discharge on Keflex p.o. to 500 mg p.o. 3 times daily end date  1/31  Adequate fluids daily probiotic-D/w patient  TDAP last dose 7/2015, pt follow-up with PCP and obtain next dose 7/25  Keep skin clean and dry,  left knee clean and dry, wound care as advised by wound care team  Patient advised to follow-up with PCP.    May DC from ID standpoint    Abx  Ceftriaxone-1/21    Extensive review of chart notes, labs, imaging, cultures done  Additionally review of done: Recent reports-Labs, cultures, imaging  D/w -hospitalist, RN    Tyrese ANUEL Melquiades  is seen for infected knee/traumatic injury  Tyrese Arnett Jr is a 74 y.o. male who is  seen for left knee injury with wound over the patella concern forinfeced hematoma.  Patient admitted  for cardiac concerns. Patient states that he fell last Monday resulting in abrasion over his left knee.  He states since that time he has had increased swelling and pain in the area as well as discoloration of his extremity.    Pt has been admitted to hospitalist service. ID service has been consulted. Pt seen today . Sitting up in recliner.Wife at side.  Left knee swelling present. Discoloration noted, no drainage.    Past Medical History:   Diagnosis Date    A-fib ([facility]) 8/10/2018    Abnormal PSA 2/16/2011    Abnormal stress ECG 8/10/2018    CAD (coronary artery disease) 2/16/2011    Cancer ([facility])     Cardiomyopathy ([facility]) 2/16/2011    Chronic diastolic HF (heart

## 2025-01-24 NOTE — CARE COORDINATION
Pt clear from CM standpoint.    Transition of Care Plan:    RUR: 12% \"low risk\"  Prior Level of Functioning: Independent with ADL's  Disposition: Home with spouse   SHARLENE: 1/24  If SNF or IPR: Date FOC offered: N/A  Follow up appointments: PCP/Specialists as indicated   DME needed: None  Transportation at discharge: Pt spouse  IM/IMM Medicare/ letter given: 2nd IM given by CM on 1/24  Is patient a [place] and connected with VA? No  Caregiver Contact: Penny Arnett (spouse), [phone]  Discharge Caregiver contacted prior to discharge? To be contacted  Care Conference needed? Not at this time   Barriers to discharge: ID    0936 AM: Chart reviewed. CM following for d/c planning. Pt to return home with pt spouse. Pt spouse to transport pt home upon medical clearance. No CM needs identified at this time.     01/24/25 1513   Services At/After Discharge   Transition of Care Consult (CM Consult) Discharge Planning   Services At/After Discharge None   [place] Resource Information Provided? No   Mode of Transport at Discharge Other (see comment)  (Spouse)   Hospital Transport Time of Discharge 1600   Confirm Follow Up Transport Family   Condition of Participation: Discharge Planning   The Plan for Transition of Care is related to the following treatment goals: Return home independently   The Patient and/or Patient Representative was provided with a Choice of Provider? Patient   The Patient and/Or Patient Representative agree with the Discharge Plan? Yes     JOHN Lord  Care Management  [facility]   [phone]  
Transition of Care Plan:    RUR: 12% \"low risk\"  Prior Level of Functioning: Independent with ADL's  Disposition: Home with spouse   SHARLENE: 1/24  If SNF or IPR: Date FOC offered: N/A  Follow up appointments: PCP/Specialists as indicated   DME needed: None  Transportation at discharge: Pt spouse  IM/IMM Medicare/ letter given: 2nd IM needed prior to d/c  Is patient a [place] and connected with VA? No  Caregiver Contact: Penny Arnett (spouse), [phone]  Discharge Caregiver contacted prior to discharge? To be contacted  Care Conference needed? Not at this time   Barriers to discharge: ID, creatinine     1428 PM: CM made attending MD aware of pt need for work note.    1207 PM: Chart reviewed. CM following for d/c planning. Pt to return home with pt spouse. Pt spouse to transport pt home upon medical clearance. CM to continue to follow.    JOHN Lord  Care Management  [facility]   [phone]    
  Bathroom Equipment None   Home Equipment None   Receives Help From Family   Prior Level of Assist for ADLs Independent   Prior Level of Assist for Homemaking Independent   Homemaking Responsibilities Yes   Ambulation Assistance Independent   Prior Level of Assist for Transfers Independent   Active  Yes   Mode of Transportation Car   Occupation Full time employment   Type of Occupation  at [employer]   Discharge Planning   Type of Residence House   Living Arrangements Spouse/Significant Other   Current Services Prior To Admission None   Potential Assistance Needed N/A   DME Ordered? No   Potential Assistance Purchasing Medications No   Type of Home Care Services None   Patient expects to be discharged to: House         Advance Care Planning     General Advance Care Planning (ACP) Conversation    Date of Conversation: 1/21/2025  Conducted with: Patient with Decision Making Capacity  Other persons present: N/A    Healthcare Decision Maker:   Primary Decision Maker: Penny Arnett - Spouse - [phone]     Today we documented Decision Maker(s) consistent with Legal Next of Kin hierarchy.    Content/Action Overview:  Has NO ACP documents-Information provided      Length of Voluntary ACP Conversation in minutes:  <16 minutes (Non-Billable)    VON Doyle.  Care Manager, [facility]  [phone]/Available on [employer]

## 2025-01-24 NOTE — PLAN OF CARE
Problem: Chronic Conditions and Co-morbidities  Goal: Patient's chronic conditions and co-morbidity symptoms are monitored and maintained or improved  Outcome: Progressing     Problem: Discharge Planning  Goal: Discharge to home or other facility with appropriate resources  Outcome: Progressing     Problem: Pain  Goal: Verbalizes/displays adequate comfort level or baseline comfort level  Outcome: Progressing     Problem: Safety - Adult  Goal: Free from fall injury  Outcome: Progressing     Problem: ABCDS Injury Assessment  Goal: Absence of physical injury  Outcome: Progressing     Problem: Physical Therapy - Adult  Goal: By Discharge: Performs mobility at highest level of function for planned discharge setting.  See evaluation for individualized goals.  Description: FUNCTIONAL STATUS PRIOR TO ADMISSION: Patient was independent and active without use of DME.    HOME SUPPORT PRIOR TO ADMISSION: The patient lived with wife but did not require assistance.    Physical Therapy Goals  Initiated 1/23/2025  1.  Patient will move from supine to sit and sit to supine in bed with independence within 7 day(s).    2.  Patient will perform sit to stand with modified independence within 7 day(s).  3.  Patient will transfer from bed to chair and chair to bed with modified independence using the least restrictive device within 7 day(s).  4.  Patient will ambulate with modified independence for 300 feet with the least restrictive device within 7 day(s).   5.  Patient will ascend/descend 3 stairs with bilateral handrail(s) with modified independence within 7 day(s).    1/23/2025 1251 by Liss Solorzano, PT  Outcome: Progressing

## 2025-01-24 NOTE — PROGRESS NOTES
I have reviewed Discharge Instructions with the patient. The patient verbalized understanding. Discharge medications reviewed with patient along with appropriate educational materials.  Opportunity for questions and clarification was provided. All lines removed without difficulty. Right radial Cath sites are clean, dry, and intact. Patient's belongings gathered and with patient. Patient is ready for discharge.  Pt taken down to discharge area.

## 2025-01-24 NOTE — PROGRESS NOTES
End of Shift Note    Bedside shift change report given to ROLY Carvalho (oncoming nurse) by Chaparro Patel RN (offgoing nurse).  Report included the following information SBAR, ED Summary, Procedure Summary, Intake/Output, MAR, Recent Results, Cardiac Rhythm AV-Paced, Quality Measures, and Dual Neuro Assessment    Shift worked:  7:00 PM-7:30 AM     Shift summary and any significant changes:     Patient had an uneventful shift. No morning labs due. Daily weight documented. Vital signs stable. No complaints of pain. Patient is hopeful to discharge home on PO Antibiotics.    Concerns for physician to address:  Discharge?     Zone phone for oncoming shift:   N/A       Activity:     Number times ambulated in hallways past shift: 0  Number of times OOB to chair past shift: 3    Cardiac:   Cardiac Monitoring: Yes           Access:  Current line(s): PIV     Genitourinary:   Urinary status: voiding    Respiratory:      Chronic home O2 use?: NO  Incentive spirometer at bedside: NO       GI:     Current diet:  ADULT DIET; Regular; 4 carb choices (60 gm/meal); Low Fat/Low Chol/High Fiber/2 gm Na  Passing flatus: YES  Tolerating current diet: YES       Pain Management:   Patient states pain is manageable on current regimen: YES    Skin:     Interventions: float heels, increase time out of bed, PT/OT consult, and nutritional support    Patient Safety:  Fall Score:    Interventions: gripper socks, pt to call before getting OOB, and stay with me (per policy)       Length of Stay:  Expected LOS: 3  Actual LOS: 3      Chaparro Patel RN

## 2025-01-26 LAB
BACTERIA SPEC CULT: NORMAL
BACTERIA SPEC CULT: NORMAL
SERVICE CMNT-IMP: NORMAL
SERVICE CMNT-IMP: NORMAL

## 2025-01-27 PROBLEM — L03.116 CELLULITIS OF KNEE, LEFT: Status: ACTIVE | Noted: 2025-01-27

## 2025-01-27 PROBLEM — E66.9 OBESITY (BMI 30-39.9): Status: ACTIVE | Noted: 2025-01-27

## 2025-01-27 PROBLEM — I50.22 CHRONIC SYSTOLIC CONGESTIVE HEART FAILURE (HCC): Status: ACTIVE | Noted: 2018-08-10

## 2025-01-27 PROBLEM — Z45.02 ICD (IMPLANTABLE CARDIOVERTER-DEFIBRILLATOR) DISCHARGE: Status: ACTIVE | Noted: 2025-01-27

## 2025-01-27 PROBLEM — S80.212A ABRASION OF LEFT KNEE: Status: ACTIVE | Noted: 2025-01-27

## 2025-01-28 ENCOUNTER — TELEPHONE (OUTPATIENT)
Age: 75
End: 2025-01-28

## 2025-01-28 NOTE — TELEPHONE ENCOUNTER
Care Transitions Initial Follow Up Call    Outreach made within 2 business days of discharge: Yes    Patient: Tyrese Arnett Jr Patient : 1950   MRN: [id number]  Reason for Admission: Arrythmia  Discharge Date: 25       Spoke with: patient    Discharge department/facility: [facility]    TCM Interactive Patient Contact:  Was patient able to fill all prescriptions: Yes  Was patient instructed to bring all medications to the follow-up visit: Yes  Is patient taking all medications as directed in the discharge summary? Yes  Does patient understand their discharge instructions: Yes  Does patient have questions or concerns that need addressed prior to 7-14 day follow up office visit: no    Additional needs identified to be addressed with provider  No needs identified             Scheduled appointment with PCP within 7-14 days    Follow Up  Future Appointments   Date Time Provider Department Center   2025  8:40 AM Steve Rice MD [facility] DEP   2/10/2025  9:00 AM [facility] CARDIOPULM INTAKE [facility]   2025  8:20 AM Steve Rice MD [facility] DEP       Luz Fernando

## 2025-02-01 NOTE — PROGRESS NOTES
NAME:  Tyrese Arnett Jr   :   1950   MRN:   [id number]     Date/Time:  2025 5:50 PM  Subjective: f/u hospitalization for Defibrillator discharge,VT,PAF .Underwent cath with new stenting of LAD..F/U  fall 2 weeks ago,with knee abrasion with cellulitis and swelling   Heart Problem  This is a chronic problem. The current episode started more than 1 year ago. The problem has been unchanged. Associated symptoms include fatigue and a rash. Pertinent negatives include no abdominal pain, chest pain or congestion. The treatment provided significant relief.   Skin Problem  This is a new problem. The problem has been gradually improving since onset. Associated symptoms include fatigue. Pertinent negatives include no congestion or shortness of breath.   Irregular Heart Beat   This is a recurrent problem. The current episode started more than 1 year ago. The problem has been resolved. Associated symptoms include an irregular heartbeat. Pertinent negatives include no chest pain or shortness of breath.      Hypertension   The history is provided by the patient. This is a chronic problem. The problem has not changed since onset.Pertinent negatives include no chest pain, no malaise/fatigue, no blurred vision, no peripheral edema and no dizziness.       Review of Systems   Constitutional:  Positive for fatigue.   HENT:  Negative for congestion.    Respiratory:  Negative for chest tightness, shortness of breath and wheezing.    Cardiovascular:  Negative for chest pain.   Gastrointestinal:  Negative for abdominal pain.   Skin:  Positive for rash and wound.   Psychiatric/Behavioral:  Negative for agitation.              Medications reviewed:  Current Outpatient Medications   Medication Sig    cephALEXin (KEFLEX) 500 MG capsule Take 1 capsule by mouth 3 times daily    amiodarone (CORDARONE) 200 MG tablet Take 1 tablet by mouth 2 times daily    apixaban (ELIQUIS) 5 MG TABS tablet Take 1 tablet by mouth 2 times daily

## 2025-02-03 PROBLEM — S80.212A ABRASION OF LEFT KNEE: Status: RESOLVED | Noted: 2025-01-27 | Resolved: 2025-02-03

## 2025-02-03 PROBLEM — R94.39 ABNORMAL STRESS ECG: Status: RESOLVED | Noted: 2018-08-10 | Resolved: 2025-02-03

## 2025-02-03 PROBLEM — E66.9 OBESITY (BMI 30-39.9): Status: RESOLVED | Noted: 2025-01-27 | Resolved: 2025-02-03

## 2025-02-03 PROBLEM — I49.01 VF (VENTRICULAR FIBRILLATION): Status: RESOLVED | Noted: 2018-08-10 | Resolved: 2025-02-03

## 2025-02-03 PROBLEM — Z95.5 S/P CORONARY ARTERY STENT PLACEMENT: Status: RESOLVED | Noted: 2018-08-10 | Resolved: 2025-02-03

## 2025-02-03 PROBLEM — I47.20 VT (VENTRICULAR TACHYCARDIA) (HCC): Status: RESOLVED | Noted: 2018-08-10 | Resolved: 2025-02-03

## 2025-02-03 PROBLEM — Z45.02 ICD (IMPLANTABLE CARDIOVERTER-DEFIBRILLATOR) DISCHARGE: Status: RESOLVED | Noted: 2025-01-27 | Resolved: 2025-02-03

## 2025-02-05 ENCOUNTER — OFFICE VISIT (OUTPATIENT)
Age: 75
End: 2025-02-05
Payer: MEDICARE

## 2025-02-05 VITALS
HEART RATE: 71 BPM | DIASTOLIC BLOOD PRESSURE: 62 MMHG | BODY MASS INDEX: 38.27 KG/M2 | SYSTOLIC BLOOD PRESSURE: 105 MMHG | RESPIRATION RATE: 18 BRPM | HEIGHT: 67 IN | TEMPERATURE: 97.9 F | OXYGEN SATURATION: 96 % | WEIGHT: 243.8 LBS

## 2025-02-05 DIAGNOSIS — I48.0 PAROXYSMAL ATRIAL FIBRILLATION (HCC): ICD-10-CM

## 2025-02-05 DIAGNOSIS — I10 ESSENTIAL HYPERTENSION, BENIGN: ICD-10-CM

## 2025-02-05 DIAGNOSIS — L03.116 CELLULITIS OF KNEE, LEFT: ICD-10-CM

## 2025-02-05 DIAGNOSIS — I25.83 CORONARY ARTERY DISEASE DUE TO LIPID RICH PLAQUE: ICD-10-CM

## 2025-02-05 DIAGNOSIS — Z95.810 PRESENCE OF BIVENTRICULAR AICD: ICD-10-CM

## 2025-02-05 DIAGNOSIS — I50.32 CHRONIC DIASTOLIC HF (HEART FAILURE) (HCC): ICD-10-CM

## 2025-02-05 DIAGNOSIS — I49.01 VF (VENTRICULAR FIBRILLATION): Primary | ICD-10-CM

## 2025-02-05 DIAGNOSIS — I25.10 CORONARY ARTERY DISEASE DUE TO LIPID RICH PLAQUE: ICD-10-CM

## 2025-02-05 DIAGNOSIS — Z91.81 AT HIGH RISK FOR FALLS: ICD-10-CM

## 2025-02-05 DIAGNOSIS — E78.2 MIXED HYPERLIPIDEMIA: ICD-10-CM

## 2025-02-05 DIAGNOSIS — N18.31 STAGE 3A CHRONIC KIDNEY DISEASE (HCC): ICD-10-CM

## 2025-02-05 PROCEDURE — 99214 OFFICE O/P EST MOD 30 MIN: CPT | Performed by: FAMILY MEDICINE

## 2025-02-05 PROCEDURE — 1036F TOBACCO NON-USER: CPT | Performed by: FAMILY MEDICINE

## 2025-02-05 PROCEDURE — 1159F MED LIST DOCD IN RCRD: CPT | Performed by: FAMILY MEDICINE

## 2025-02-05 PROCEDURE — G8427 DOCREV CUR MEDS BY ELIG CLIN: HCPCS | Performed by: FAMILY MEDICINE

## 2025-02-05 PROCEDURE — 1125F AMNT PAIN NOTED PAIN PRSNT: CPT | Performed by: FAMILY MEDICINE

## 2025-02-05 PROCEDURE — 3074F SYST BP LT 130 MM HG: CPT | Performed by: FAMILY MEDICINE

## 2025-02-05 PROCEDURE — 3017F COLORECTAL CA SCREEN DOC REV: CPT | Performed by: FAMILY MEDICINE

## 2025-02-05 PROCEDURE — G8417 CALC BMI ABV UP PARAM F/U: HCPCS | Performed by: FAMILY MEDICINE

## 2025-02-05 PROCEDURE — 1111F DSCHRG MED/CURRENT MED MERGE: CPT | Performed by: FAMILY MEDICINE

## 2025-02-05 PROCEDURE — 1123F ACP DISCUSS/DSCN MKR DOCD: CPT | Performed by: FAMILY MEDICINE

## 2025-02-05 PROCEDURE — 3078F DIAST BP <80 MM HG: CPT | Performed by: FAMILY MEDICINE

## 2025-02-05 RX ORDER — ALCOHOL 62 MG/ML
LIQUID TOPICAL
COMMUNITY

## 2025-02-05 RX ORDER — MECLIZINE HYDROCHLORIDE 25 MG/1
TABLET ORAL
COMMUNITY

## 2025-02-05 RX ORDER — CEPHALEXIN 500 MG/1
500 CAPSULE ORAL 3 TIMES DAILY
Qty: 30 CAPSULE | Refills: 0 | Status: SHIPPED | OUTPATIENT
Start: 2025-02-05

## 2025-02-05 RX ORDER — MUPIROCIN 20 MG/G
OINTMENT TOPICAL
COMMUNITY
Start: 2025-01-27

## 2025-02-05 ASSESSMENT — PATIENT HEALTH QUESTIONNAIRE - PHQ9
SUM OF ALL RESPONSES TO PHQ QUESTIONS 1-9: 0
SUM OF ALL RESPONSES TO PHQ9 QUESTIONS 1 & 2: 0
SUM OF ALL RESPONSES TO PHQ QUESTIONS 1-9: 0
SUM OF ALL RESPONSES TO PHQ QUESTIONS 1-9: 0
1. LITTLE INTEREST OR PLEASURE IN DOING THINGS: NOT AT ALL
SUM OF ALL RESPONSES TO PHQ QUESTIONS 1-9: 0
2. FEELING DOWN, DEPRESSED OR HOPELESS: NOT AT ALL

## 2025-02-05 ASSESSMENT — ENCOUNTER SYMPTOMS
WHEEZING: 0
ABDOMINAL PAIN: 0
SHORTNESS OF BREATH: 0
CHEST TIGHTNESS: 0

## 2025-02-05 NOTE — PROGRESS NOTES
Chief Complaint   Patient presents with    Follow-Up from Hospital     Patient is here today for a hospital follow up.      \"Have you been to the ER, urgent care clinic since your last visit?  Hospitalized since your last visit?\"    1/21/25-1/24/25 [facility] for arrhythmia    “Have you seen or consulted any other health care providers outside of [facility] since your last visit?”    NO        “Have you had a colorectal cancer screening such as a colonoscopy/FIT/Cologuard?    NO    Date of last Colonoscopy: 5/14/2012  No cologuard on file  No FIT/FOBT on file   No flexible sigmoidoscopy on file         Click Here for Release of Records Request

## 2025-03-25 RX ORDER — TAMSULOSIN HYDROCHLORIDE 0.4 MG/1
0.4 CAPSULE ORAL DAILY
Qty: 90 CAPSULE | Refills: 3 | Status: SHIPPED | OUTPATIENT
Start: 2025-03-25

## 2025-03-25 NOTE — TELEPHONE ENCOUNTER
Last appointment: 2/5/25  Next appointment: 5/7/25, 12/30/25  Previous refill encounter(s): 1/20/25 #30 with 1 refill    Requested Prescriptions     Pending Prescriptions Disp Refills    tamsulosin (FLOMAX) 0.4 MG capsule [Pharmacy Med Name: tamsulosin 0.4 mg capsule] 90 capsule 3     Sig: TAKE 1 CAPSULE BY MOUTH DAILY         For Pharmacy Admin Tracking Only    Program: Medication Refill  CPA in place:    Recommendation Provided To:   Intervention Detail: New Rx: 1, reason: Patient Preference  Intervention Accepted By:   Gap Closed?:    Time Spent (min): 5

## 2025-05-05 NOTE — PROGRESS NOTES
NAME:  Tyrese Arnett Jr   :   1950   MRN:   [id number]     Date/Time:  2025 6:17 AM  Subjective: f/u chf,hbp,chool,elevated PSA.Feeling well,BP has been running low.To see cardiology soon   Abnormal Lab  This is a new problem. The current episode started more than 1 month ago. The problem occurs daily. The problem has been unchanged.      Heart Problem  This is a chronic problem. The current episode started more than 1 year ago. The problem has been unchanged. Associated symptoms include fatigue and a rash. Pertinent negatives include no abdominal pain, chest pain or congestion. The treatment provided significant relief..   Irregular Heart Beat   This is a recurrent problem. The current episode started more than 1 year ago. The problem has been resolved. Associated symptoms include an irregular heartbeat. Pertinent negatives include no chest pain or shortness of breath.    Hypertension   The history is provided by the patient. This is a chronic problem. The problem has not changed since onset.Pertinent negatives include no chest pain, no malaise/fatigue, no blurred vision, no peripheral edema and no dizziness.   Review of Systems          Medications reviewed:  Current Outpatient Medications   Medication Sig    tamsulosin (FLOMAX) 0.4 MG capsule TAKE 1 CAPSULE BY MOUTH DAILY    meclizine (ANTIVERT) 25 MG tablet Take 1 tablet as needed by oral route for 10 days.    mupirocin (BACTROBAN) 2 % ointment apply a SMALL AMOUNT TO THE affected AREA by topical route THREE times PER DAY    Wound Dressings (SKINTEGRITY HYDROGEL) GEL Apply topically    cephALEXin (KEFLEX) 500 MG capsule Take 1 capsule by mouth 3 times daily    amiodarone (CORDARONE) 200 MG tablet Take 1 tablet by mouth 2 times daily    apixaban (ELIQUIS) 5 MG TABS tablet Take 1 tablet by mouth 2 times daily    empagliflozin (JARDIANCE) 10 MG tablet Take 1 tablet by mouth daily    gentamicin (GARAMYCIN) 0.1 % ointment Apply topically 3 times daily

## 2025-05-07 ENCOUNTER — OFFICE VISIT (OUTPATIENT)
Age: 75
End: 2025-05-07
Payer: MEDICARE

## 2025-05-07 VITALS
WEIGHT: 241 LBS | DIASTOLIC BLOOD PRESSURE: 57 MMHG | TEMPERATURE: 98.2 F | SYSTOLIC BLOOD PRESSURE: 96 MMHG | OXYGEN SATURATION: 95 % | RESPIRATION RATE: 18 BRPM | HEART RATE: 70 BPM | BODY MASS INDEX: 37.75 KG/M2

## 2025-05-07 DIAGNOSIS — I10 ESSENTIAL HYPERTENSION, BENIGN: ICD-10-CM

## 2025-05-07 DIAGNOSIS — Z12.5 SCREENING FOR PROSTATE CANCER: ICD-10-CM

## 2025-05-07 DIAGNOSIS — N18.31 STAGE 3A CHRONIC KIDNEY DISEASE (HCC): ICD-10-CM

## 2025-05-07 DIAGNOSIS — Z95.810 PRESENCE OF BIVENTRICULAR AICD: ICD-10-CM

## 2025-05-07 DIAGNOSIS — I10 ESSENTIAL HYPERTENSION, BENIGN: Primary | ICD-10-CM

## 2025-05-07 DIAGNOSIS — I50.22 CHRONIC SYSTOLIC CONGESTIVE HEART FAILURE (HCC): ICD-10-CM

## 2025-05-07 DIAGNOSIS — I50.32 CHRONIC DIASTOLIC HF (HEART FAILURE) (HCC): ICD-10-CM

## 2025-05-07 DIAGNOSIS — E78.2 MIXED HYPERLIPIDEMIA: ICD-10-CM

## 2025-05-07 DIAGNOSIS — I49.01 VF (VENTRICULAR FIBRILLATION) (HCC): ICD-10-CM

## 2025-05-07 PROCEDURE — G8427 DOCREV CUR MEDS BY ELIG CLIN: HCPCS | Performed by: FAMILY MEDICINE

## 2025-05-07 PROCEDURE — 1159F MED LIST DOCD IN RCRD: CPT | Performed by: FAMILY MEDICINE

## 2025-05-07 PROCEDURE — 3078F DIAST BP <80 MM HG: CPT | Performed by: FAMILY MEDICINE

## 2025-05-07 PROCEDURE — 1126F AMNT PAIN NOTED NONE PRSNT: CPT | Performed by: FAMILY MEDICINE

## 2025-05-07 PROCEDURE — 1036F TOBACCO NON-USER: CPT | Performed by: FAMILY MEDICINE

## 2025-05-07 PROCEDURE — 99214 OFFICE O/P EST MOD 30 MIN: CPT | Performed by: FAMILY MEDICINE

## 2025-05-07 PROCEDURE — G8417 CALC BMI ABV UP PARAM F/U: HCPCS | Performed by: FAMILY MEDICINE

## 2025-05-07 PROCEDURE — 1123F ACP DISCUSS/DSCN MKR DOCD: CPT | Performed by: FAMILY MEDICINE

## 2025-05-07 PROCEDURE — 3074F SYST BP LT 130 MM HG: CPT | Performed by: FAMILY MEDICINE

## 2025-05-07 ASSESSMENT — PATIENT HEALTH QUESTIONNAIRE - PHQ9
SUM OF ALL RESPONSES TO PHQ QUESTIONS 1-9: 0
2. FEELING DOWN, DEPRESSED OR HOPELESS: NOT AT ALL
SUM OF ALL RESPONSES TO PHQ QUESTIONS 1-9: 0
1. LITTLE INTEREST OR PLEASURE IN DOING THINGS: NOT AT ALL

## 2025-05-07 NOTE — PROGRESS NOTES
Chief Complaint   Patient presents with    3 Month Follow-Up       \"Have you been to the ER, urgent care clinic since your last visit?  Hospitalized since your last visit?\"    NO    “Have you seen or consulted any other health care providers outside of [facility] since your last visit?”    NO            Click Here for Release of Records Request       There were no vitals filed for this visit.   Health Maintenance Due   Topic Date Due    Shingles vaccine (1 of 2) 2013    Pneumococcal 50+ years Vaccine (2 of 2 - PPSV23) 10/10/2018    Respiratory Syncytial Virus (RSV) Pregnant or age 60 yrs+ (1 - 1-dose 75+ series) Never done    COVID-19 Vaccine ( season) Never done        The patient, Tyrese Arnett Jr, identity was verified by name and .

## 2025-05-08 LAB
ALBUMIN SERPL-MCNC: 3.8 G/DL (ref 3.5–5)
ALBUMIN/GLOB SERPL: 1.5 (ref 1.1–2.2)
ALP SERPL-CCNC: 53 U/L (ref 45–117)
ALT SERPL-CCNC: 29 U/L (ref 12–78)
ANION GAP SERPL CALC-SCNC: 9 MMOL/L (ref 2–12)
AST SERPL-CCNC: 14 U/L (ref 15–37)
BASOPHILS # BLD: 0.04 K/UL (ref 0–0.1)
BASOPHILS NFR BLD: 0.6 % (ref 0–1)
BILIRUB SERPL-MCNC: 1.1 MG/DL (ref 0.2–1)
BUN SERPL-MCNC: 16 MG/DL (ref 6–20)
BUN/CREAT SERPL: 9 (ref 12–20)
CALCIUM SERPL-MCNC: 9.1 MG/DL (ref 8.5–10.1)
CHLORIDE SERPL-SCNC: 112 MMOL/L (ref 97–108)
CHOLEST SERPL-MCNC: 119 MG/DL
CO2 SERPL-SCNC: 20 MMOL/L (ref 21–32)
CREAT SERPL-MCNC: 1.75 MG/DL (ref 0.7–1.3)
DIFFERENTIAL METHOD BLD: ABNORMAL
EOSINOPHIL # BLD: 0.11 K/UL (ref 0–0.4)
EOSINOPHIL NFR BLD: 1.6 % (ref 0–7)
ERYTHROCYTE [DISTWIDTH] IN BLOOD BY AUTOMATED COUNT: 13.8 % (ref 11.5–14.5)
GLOBULIN SER CALC-MCNC: 2.6 G/DL (ref 2–4)
GLUCOSE SERPL-MCNC: 125 MG/DL (ref 65–100)
HCT VFR BLD AUTO: 44.4 % (ref 36.6–50.3)
HGB BLD-MCNC: 14.5 G/DL (ref 12.1–17)
IMM GRANULOCYTES # BLD AUTO: 0.04 K/UL (ref 0–0.04)
IMM GRANULOCYTES NFR BLD AUTO: 0.6 % (ref 0–0.5)
LYMPHOCYTES # BLD: 0.93 K/UL (ref 0.8–3.5)
LYMPHOCYTES NFR BLD: 13.3 % (ref 12–49)
MCH RBC QN AUTO: 30.9 PG (ref 26–34)
MCHC RBC AUTO-ENTMCNC: 32.7 G/DL (ref 30–36.5)
MCV RBC AUTO: 94.5 FL (ref 80–99)
MONOCYTES # BLD: 0.43 K/UL (ref 0–1)
MONOCYTES NFR BLD: 6.2 % (ref 5–13)
NEUTS SEG # BLD: 5.44 K/UL (ref 1.8–8)
NEUTS SEG NFR BLD: 77.7 % (ref 32–75)
NRBC # BLD: 0 K/UL (ref 0–0.01)
NRBC BLD-RTO: 0 PER 100 WBC
PLATELET # BLD AUTO: 150 K/UL (ref 150–400)
PMV BLD AUTO: 11.1 FL (ref 8.9–12.9)
POTASSIUM SERPL-SCNC: 4.3 MMOL/L (ref 3.5–5.1)
PROT SERPL-MCNC: 6.4 G/DL (ref 6.4–8.2)
PSA SERPL-MCNC: 6 NG/ML (ref 0.01–4)
RBC # BLD AUTO: 4.7 M/UL (ref 4.1–5.7)
SODIUM SERPL-SCNC: 141 MMOL/L (ref 136–145)
WBC # BLD AUTO: 7 K/UL (ref 4.1–11.1)

## 2025-05-09 ENCOUNTER — RESULTS FOLLOW-UP (OUTPATIENT)
Age: 75
End: 2025-05-09

## 2025-05-09 DIAGNOSIS — R97.20 ELEVATED PSA: Primary | ICD-10-CM

## 2025-08-07 ENCOUNTER — OFFICE VISIT (OUTPATIENT)
Age: 75
End: 2025-08-07
Payer: MEDICARE

## 2025-08-07 VITALS
SYSTOLIC BLOOD PRESSURE: 102 MMHG | HEART RATE: 72 BPM | OXYGEN SATURATION: 98 % | RESPIRATION RATE: 18 BRPM | HEIGHT: 67 IN | WEIGHT: 241 LBS | TEMPERATURE: 98.1 F | BODY MASS INDEX: 37.83 KG/M2 | DIASTOLIC BLOOD PRESSURE: 67 MMHG

## 2025-08-07 DIAGNOSIS — Z12.5 SCREENING FOR PROSTATE CANCER: ICD-10-CM

## 2025-08-07 DIAGNOSIS — I42.9 CARDIOMYOPATHY, UNSPECIFIED TYPE (HCC): ICD-10-CM

## 2025-08-07 DIAGNOSIS — I50.22 CHRONIC SYSTOLIC CONGESTIVE HEART FAILURE (HCC): ICD-10-CM

## 2025-08-07 DIAGNOSIS — I48.0 PAROXYSMAL ATRIAL FIBRILLATION (HCC): ICD-10-CM

## 2025-08-07 DIAGNOSIS — I10 ESSENTIAL HYPERTENSION, BENIGN: ICD-10-CM

## 2025-08-07 DIAGNOSIS — R82.90 MALODOROUS URINE: ICD-10-CM

## 2025-08-07 DIAGNOSIS — E78.2 MIXED HYPERLIPIDEMIA: ICD-10-CM

## 2025-08-07 DIAGNOSIS — Z95.810 PRESENCE OF BIVENTRICULAR AICD: ICD-10-CM

## 2025-08-07 DIAGNOSIS — I50.32 CHRONIC DIASTOLIC HF (HEART FAILURE) (HCC): ICD-10-CM

## 2025-08-07 DIAGNOSIS — N18.31 STAGE 3A CHRONIC KIDNEY DISEASE (HCC): ICD-10-CM

## 2025-08-07 DIAGNOSIS — I10 ESSENTIAL HYPERTENSION, BENIGN: Primary | ICD-10-CM

## 2025-08-07 LAB
BILIRUBIN, URINE, POC: NEGATIVE
BLOOD URINE, POC: NORMAL
GLUCOSE URINE, POC: NORMAL
KETONES, URINE, POC: NEGATIVE
LEUKOCYTE ESTERASE, URINE, POC: NEGATIVE
NITRITE, URINE, POC: NORMAL
PH, URINE, POC: 5.5 (ref 4.6–8)
PROTEIN,URINE, POC: NEGATIVE
SPECIFIC GRAVITY, URINE, POC: 1.01 (ref 1–1.03)
URINALYSIS CLARITY, POC: CLEAR
URINALYSIS COLOR, POC: YELLOW
UROBILINOGEN, POC: NORMAL MG/DL

## 2025-08-07 PROCEDURE — 1126F AMNT PAIN NOTED NONE PRSNT: CPT | Performed by: FAMILY MEDICINE

## 2025-08-07 PROCEDURE — 81002 URINALYSIS NONAUTO W/O SCOPE: CPT | Performed by: FAMILY MEDICINE

## 2025-08-07 PROCEDURE — 99213 OFFICE O/P EST LOW 20 MIN: CPT | Performed by: FAMILY MEDICINE

## 2025-08-07 PROCEDURE — 1123F ACP DISCUSS/DSCN MKR DOCD: CPT | Performed by: FAMILY MEDICINE

## 2025-08-07 PROCEDURE — 1159F MED LIST DOCD IN RCRD: CPT | Performed by: FAMILY MEDICINE

## 2025-08-07 PROCEDURE — 3074F SYST BP LT 130 MM HG: CPT | Performed by: FAMILY MEDICINE

## 2025-08-07 PROCEDURE — PBSHW AMB POC URINALYSIS DIP STICK MANUAL W/O MICRO: Performed by: FAMILY MEDICINE

## 2025-08-07 PROCEDURE — G8427 DOCREV CUR MEDS BY ELIG CLIN: HCPCS | Performed by: FAMILY MEDICINE

## 2025-08-07 PROCEDURE — 1036F TOBACCO NON-USER: CPT | Performed by: FAMILY MEDICINE

## 2025-08-07 PROCEDURE — 3078F DIAST BP <80 MM HG: CPT | Performed by: FAMILY MEDICINE

## 2025-08-07 PROCEDURE — G8417 CALC BMI ABV UP PARAM F/U: HCPCS | Performed by: FAMILY MEDICINE

## 2025-08-07 RX ORDER — CLOPIDOGREL BISULFATE 75 MG/1
75 TABLET ORAL
COMMUNITY
Start: 2025-02-05

## 2025-08-07 ASSESSMENT — ENCOUNTER SYMPTOMS
WHEEZING: 0
ABDOMINAL PAIN: 0
CHEST TIGHTNESS: 0
STRIDOR: 0
SHORTNESS OF BREATH: 0

## 2025-08-07 ASSESSMENT — PATIENT HEALTH QUESTIONNAIRE - PHQ9
SUM OF ALL RESPONSES TO PHQ QUESTIONS 1-9: 0
2. FEELING DOWN, DEPRESSED OR HOPELESS: NOT AT ALL
1. LITTLE INTEREST OR PLEASURE IN DOING THINGS: NOT AT ALL

## 2025-08-08 ENCOUNTER — RESULTS FOLLOW-UP (OUTPATIENT)
Age: 75
End: 2025-08-08

## 2025-08-08 LAB
ALBUMIN SERPL-MCNC: 3.8 G/DL (ref 3.5–5.2)
ALBUMIN/GLOB SERPL: 1.9 (ref 1.1–2.2)
ALP SERPL-CCNC: 46 U/L (ref 40–129)
ALT SERPL-CCNC: 32 U/L (ref 10–50)
ANION GAP SERPL CALC-SCNC: 13 MMOL/L (ref 2–14)
AST SERPL-CCNC: 29 U/L (ref 10–50)
BACTERIA SPEC CULT: NORMAL
BILIRUB SERPL-MCNC: 1.3 MG/DL (ref 0–1.2)
BUN SERPL-MCNC: 15 MG/DL (ref 8–23)
BUN/CREAT SERPL: 10 (ref 12–20)
CALCIUM SERPL-MCNC: 9.1 MG/DL (ref 8.8–10.2)
CHLORIDE SERPL-SCNC: 106 MMOL/L (ref 98–107)
CO2 SERPL-SCNC: 20 MMOL/L (ref 20–29)
CREAT SERPL-MCNC: 1.58 MG/DL (ref 0.7–1.2)
GLOBULIN SER CALC-MCNC: 2 G/DL (ref 2–4)
GLUCOSE SERPL-MCNC: 122 MG/DL (ref 65–100)
POTASSIUM SERPL-SCNC: 4.2 MMOL/L (ref 3.5–5.1)
PROT SERPL-MCNC: 5.9 G/DL (ref 6.4–8.3)
PSA SERPL-MCNC: 5.7 NG/ML (ref 0–4.4)
SERVICE CMNT-IMP: NORMAL
SODIUM SERPL-SCNC: 139 MMOL/L (ref 136–145)

## (undated) DEVICE — SC 3W HP RA OFF NB - PG: Brand: NAMIC

## (undated) DEVICE — GUIDEWIRE VASC L260CM 0.035IN J TIP L3MM PTFE FIX COR NAMIC

## (undated) DEVICE — CATHETER GUID 6FR L150CM RAP EXCHG L25CM TIP 5.1FR PUSHROD

## (undated) DEVICE — SYRINGE ANGIO 10 CC BRL STD PRNT POLYCARB LT BLU MEDALLION

## (undated) DEVICE — CATH BLLN ANGIO 2X20MM NC EUPHORIA RX

## (undated) DEVICE — CATH BLLN ANGIO 3.50X20MM NC EUPHORIA RX

## (undated) DEVICE — TR BAND RADIAL ARTERY COMPRESSION DEVICE: Brand: TR BAND

## (undated) DEVICE — CATHETER COR DIAG TRANSFORMER 3.5 5FR L100CM 0 SIDE H

## (undated) DEVICE — PACLITAXEL-COATED BALLOON CATHETER: Brand: AGENT™

## (undated) DEVICE — HEART CATH-MRMC: Brand: MEDLINE INDUSTRIES, INC.

## (undated) DEVICE — CUSTOM KT PTCA INFL DEV K05 00053H (ORDER MUTLIPLES OF 5 EACH)

## (undated) DEVICE — MEDI-TRACE CADENCE ADULT, DEFIBRILLATION ELECTRODE -RTS  (10 PR/PK) - PHYSIO-CONTROL: Brand: MEDI-TRACE CADENCE

## (undated) DEVICE — CATH BLLN ANGIO 4X15MM NC EUPHORIA RX

## (undated) DEVICE — CATHETER GUID 6FR L100CM DIA0.071IN NYL SHFT EBU3.5

## (undated) DEVICE — SPLINT WR VELC FOAM NEUT POS DISP FOR RAD ART ACC SFT STRP

## (undated) DEVICE — CORONARY IMAGING CATHETER: Brand: OPTICROSS™ 6 HD

## (undated) DEVICE — VALVE ANGIO ID0.11IN HEMSTAT MTL GUID WIRE INSRT TOOL AND

## (undated) DEVICE — Device: Brand: PROWATER

## (undated) DEVICE — CATHETER DIAG 5FR L100CM LUMN ID0.047IN JL3.5 CRV 0 SIDE H

## (undated) DEVICE — GLIDESHEATH SLENDER STAINLESS STEEL KIT: Brand: GLIDESHEATH SLENDER

## (undated) DEVICE — TUBING PRSS MON L6IN PVC M FEM CONN

## (undated) DEVICE — DISPOSABLE PULLBACK SLED FOR MOTORDRIVE

## (undated) DEVICE — CATHETER DIAG 5FR L100CM LUMN ID0.047IN JR4 CRV 0 SIDE H

## (undated) DEVICE — PROVE COVER: Brand: UNBRANDED

## (undated) DEVICE — RUNTHROUGH NS EXTRA FLOPPY PTCA GUIDEWIRE: Brand: RUNTHROUGH

## (undated) DEVICE — 3M™ TEGADERM™ TRANSPARENT FILM DRESSING FRAME STYLE, 1626W, 4 IN X 4-3/4 IN (10 CM X 12 CM), 50/CT 4CT/CASE: Brand: 3M™ TEGADERM™

## (undated) DEVICE — CATHETER IVL C2PLUS SHOCKWAVE 4.0MM X 12MM

## (undated) DEVICE — CATHETER BLLN 142CM SPRINTER LEGEND RX 1.25MMX15MM

## (undated) DEVICE — HI-TORQUE VERSACORE FLOPPY GUIDE WIRE SYSTEM 145 CM: Brand: HI-TORQUE VERSACORE